# Patient Record
Sex: FEMALE | Race: WHITE
[De-identification: names, ages, dates, MRNs, and addresses within clinical notes are randomized per-mention and may not be internally consistent; named-entity substitution may affect disease eponyms.]

---

## 2018-09-11 ENCOUNTER — HOSPITAL ENCOUNTER (EMERGENCY)
Dept: HOSPITAL 97 - ER | Age: 43
LOS: 1 days | Discharge: HOME | End: 2018-09-12
Payer: SELF-PAY

## 2018-09-11 DIAGNOSIS — Z88.0: ICD-10-CM

## 2018-09-11 DIAGNOSIS — N39.0: Primary | ICD-10-CM

## 2018-09-11 DIAGNOSIS — R55: ICD-10-CM

## 2018-09-11 DIAGNOSIS — K80.20: ICD-10-CM

## 2018-09-11 DIAGNOSIS — Z88.5: ICD-10-CM

## 2018-09-11 LAB
HCT VFR BLD CALC: 37.2 % (ref 36–45)
INR BLD: 0.95
LYMPHOCYTES # SPEC AUTO: 1.7 K/UL (ref 0.7–4.9)
MCH RBC QN AUTO: 32.3 PG (ref 27–35)
MCV RBC: 92.6 FL (ref 80–100)
PMV BLD: 9.5 FL (ref 7.6–11.3)
RBC # BLD: 4.02 M/UL (ref 3.86–4.86)

## 2018-09-11 PROCEDURE — 74177 CT ABD & PELVIS W/CONTRAST: CPT

## 2018-09-11 PROCEDURE — 99285 EMERGENCY DEPT VISIT HI MDM: CPT

## 2018-09-11 PROCEDURE — 85730 THROMBOPLASTIN TIME PARTIAL: CPT

## 2018-09-11 PROCEDURE — 83690 ASSAY OF LIPASE: CPT

## 2018-09-11 PROCEDURE — 87086 URINE CULTURE/COLONY COUNT: CPT

## 2018-09-11 PROCEDURE — 83880 ASSAY OF NATRIURETIC PEPTIDE: CPT

## 2018-09-11 PROCEDURE — 85025 COMPLETE CBC W/AUTO DIFF WBC: CPT

## 2018-09-11 PROCEDURE — 93005 ELECTROCARDIOGRAM TRACING: CPT

## 2018-09-11 PROCEDURE — 96374 THER/PROPH/DIAG INJ IV PUSH: CPT

## 2018-09-11 PROCEDURE — 80076 HEPATIC FUNCTION PANEL: CPT

## 2018-09-11 PROCEDURE — 81015 MICROSCOPIC EXAM OF URINE: CPT

## 2018-09-11 PROCEDURE — 36415 COLL VENOUS BLD VENIPUNCTURE: CPT

## 2018-09-11 PROCEDURE — 83735 ASSAY OF MAGNESIUM: CPT

## 2018-09-11 PROCEDURE — 70450 CT HEAD/BRAIN W/O DYE: CPT

## 2018-09-11 PROCEDURE — 84484 ASSAY OF TROPONIN QUANT: CPT

## 2018-09-11 PROCEDURE — 85610 PROTHROMBIN TIME: CPT

## 2018-09-11 PROCEDURE — 96375 TX/PRO/DX INJ NEW DRUG ADDON: CPT

## 2018-09-11 PROCEDURE — 82553 CREATINE MB FRACTION: CPT

## 2018-09-11 PROCEDURE — 82550 ASSAY OF CK (CPK): CPT

## 2018-09-11 PROCEDURE — 82150 ASSAY OF AMYLASE: CPT

## 2018-09-11 PROCEDURE — 80048 BASIC METABOLIC PNL TOTAL CA: CPT

## 2018-09-11 PROCEDURE — 87088 URINE BACTERIA CULTURE: CPT

## 2018-09-11 PROCEDURE — 96361 HYDRATE IV INFUSION ADD-ON: CPT

## 2018-09-11 PROCEDURE — 71045 X-RAY EXAM CHEST 1 VIEW: CPT

## 2018-09-12 VITALS — TEMPERATURE: 98.3 F

## 2018-09-12 VITALS — OXYGEN SATURATION: 98 %

## 2018-09-12 VITALS — SYSTOLIC BLOOD PRESSURE: 103 MMHG | DIASTOLIC BLOOD PRESSURE: 71 MMHG

## 2018-09-12 LAB
ALBUMIN SERPL BCP-MCNC: 3.4 G/DL (ref 3.4–5)
ALP SERPL-CCNC: 85 U/L (ref 45–117)
ALT SERPL W P-5'-P-CCNC: 16 U/L (ref 12–78)
AMYLASE SERPL-CCNC: 34 U/L (ref 25–115)
AST SERPL W P-5'-P-CCNC: 12 U/L (ref 15–37)
BUN BLD-MCNC: 7 MG/DL (ref 7–18)
CKMB CREATINE KINASE MB: < 1 NG/ML (ref 0.3–3.6)
GLUCOSE SERPLBLD-MCNC: 98 MG/DL (ref 74–106)
LIPASE SERPL-CCNC: 102 U/L (ref 73–393)
MAGNESIUM SERPL-MCNC: 2.1 MG/DL (ref 1.8–2.4)
NT-PROBNP SERPL-MCNC: 251 PG/ML (ref ?–125)
POTASSIUM SERPL-SCNC: 3.5 MMOL/L (ref 3.5–5.1)
TROPONIN (EMERG DEPT USE ONLY): < 0.02 NG/ML (ref 0–0.04)
UA COMPLETE W REFLEX CULTURE PNL UR: (no result)

## 2018-09-12 NOTE — RAD REPORT
EXAM DESCRIPTION:  RAD - Chest Single View - 9/12/2018 12:04 am

 

CLINICAL HISTORY:  syncope;Chest pain

Chest pain.

 

COMPARISON:  Chest Single View dated 11/1/2017

 

FINDINGS:  Portable technique limits examination quality.

 

The lungs are grossly clear. The heart is normal in size. No displaced fractures.

 

IMPRESSION:  No acute intrathoracic process suspected.

## 2018-09-12 NOTE — EKG
Test Date:    2018-09-12               Test Time:    00:05:29

Technician:   [facility]                                    

                                                     

MEASUREMENT RESULTS:                                       

Intervals:                                           

Rate:         57                                     

GA:           142                                    

QRSD:         80                                     

QT:           432                                    

QTc:          420                                    

Axis:                                                

P:            74                                     

GA:           142                                    

QRS:          32                                     

T:            37                                     

                                                     

INTERPRETIVE STATEMENTS:                                       

                                                     

Sinus bradycardia

Otherwise normal ECG

No previous ECG available for comparison



Electronically Signed On 09-12-18 12:08:39 CDT by Ricky Marie

## 2018-09-12 NOTE — EDPHYS
Physician Documentation                                                                           

 [facility]                                                                

Name: Ashley Ayala                                                                             

Age: 43 yrs                                                                                       

Sex: Female                                                                                       

: 1975                                                                                   

MRN: [id number]                                                                                   

Arrival Date: 2018                                                                          

Time: 22:33                                                                                       

Account#: [id number]                                                                            

Bed 27                                                                                            

Private MD:                                                                                       

ED Physician Christopher Hou                                                                             

HPI:                                                                                              

                                                                                             

23:20 This 43 yrs old  Female presents to ER via EMS with complaints of Syncope.     pkl 

23:20 The patient presents with abdominal pain right lower quadrant. Onset: The               pkl 

      symptoms/episode began/occurred 3 week(s) ago, and became worse just prior to arrival.      

      Associated signs and symptoms: Pertinent positives: syncope.                                

                                                                                                  

OB/GYN:                                                                                           

22:56 LMP 2018                                                                            tl3 

                                                                                                  

Historical:                                                                                       

- Allergies:                                                                                      

22:53 Codeine;                                                                                tl3 

22:53 Hydrocodone-Acetaminophen;                                                              tl3 

22:53 PENICILLINS;                                                                            tl3 

- Home Meds:                                                                                      

22:53 None [Active];                                                                          tl3 

- PSHx:                                                                                           

22:53 None;                                                                                   tl3 

                                                                                                  

- Immunization history:: Adult Immunizations up to date.                                          

- Social history:: Smoking status: unknown.                                                       

- Ebola Screening: : No symptoms or risks identified at this time.                                

                                                                                                  

                                                                                                  

ROS:                                                                                              

23:20 Eyes: Negative for injury, pain, redness, and discharge, ENT: Negative for injury,      pkl 

      pain, and discharge, Neck: Negative for injury, pain, and swelling, Cardiovascular:         

      Negative for chest pain, palpitations, and edema, Respiratory: Negative for shortness       

      of breath, cough, wheezing, and pleuritic chest pain.                                       

23:20 Abdomen/GI: Positive for abdominal pain, of the right lower quadrant.                       

23:20 Back: Negative for acute changes.                                                           

23:20 : Negative for urinary symptoms.                                                          

23:20 MS/extremity: Negative for acute changes.                                                   

23:20 Skin: Negative for rash.                                                                    

23:20 Neuro: Negative for altered mental status.                                                  

                                                                                                  

Exam:                                                                                             

23:20 Head/Face:  Normocephalic, atraumatic. Eyes:  Pupils equal round and reactive to light, pkl 

      extra-ocular motions intact.  Lids and lashes normal.  Conjunctiva and sclera are           

      non-icteric and not injected.  Cornea within normal limits.  Periorbital areas with no      

      swelling, redness, or edema. ENT:  Nares patent. No nasal discharge, no septal              

      abnormalities noted.  Tympanic membranes are normal and external auditory canals are        

      clear.  Oropharynx with no redness, swelling, or masses, exudates, or evidence of           

      obstruction, uvula midline.  Mucous membranes moist. Neck:  Trachea midline, no             

      thyromegaly or masses palpated, and no cervical lymphadenopathy.  Supple, full range of     

      motion without nuchal rigidity, or vertebral point tenderness.  No Meningismus.             

      Chest/axilla:  Normal chest wall appearance and motion.  Nontender with no deformity.       

      No lesions are appreciated. Cardiovascular:  Regular rate and rhythm with a normal S1       

      and S2.  No gallops, murmurs, or rubs.  Normal PMI, no JVD.  No pulse deficits.             

      Respiratory:  Lungs have equal breath sounds bilaterally, clear to auscultation and         

      percussion.  No rales, rhonchi or wheezes noted.  No increased work of breathing, no        

      retractions or nasal flaring.                                                               

23:20 Abdomen/GI: Bowel sounds: normal, Palpation: soft, mild abdominal tenderness, in the        

      right lower quadrant.                                                                       

23:20 Back: Exam negative for acute changes.                                                      

23:20 : Exam negative for acute changes.                                                        

23:20 Musculoskeletal/extremity: Exam is negative for acute changes.                              

23:20 Skin: Exam negative for rash.                                                               

23:20 Neuro: Orientation: is normal, Mentation: is normal, Cranial nerves: grossly normal,        

      Motor: is normal.                                                                           

                                                                                                  

Vital Signs:                                                                                      

22:56  / 62; Pulse 61; Resp 18; Temp 98.3; Pulse Ox 100% on R/A;                        tl3 

                                                                                             

00:34  / 67; Pulse 52; Resp 18; Pulse Ox 98% on R/A;                                    tl3 

01:03  / 71; Pulse 71; Resp 16; Pulse Ox 98% on R/A;                                    tl3 

                                                                                                  

MDM:                                                                                              

                                                                                             

23:12 Patient medically screened.                                                             pkl 

                                                                                             

02:34 Data reviewed: vital signs, nurses notes, lab test result(s), radiologic studies, CT    pkl 

      scan.                                                                                       

                                                                                                  

                                                                                             

23:18 Order name: Basic Metabolic Panel; Complete Time: 02:32                                 pkl 

                                                                                             

23:18 Order name: CBC with Diff; Complete Time: 02:32                                         pkl 

                                                                                             

23:18 Order name: Ckmb; Complete Time: 02:32                                                  pkl 

                                                                                             

23:18 Order name: CPK; Complete Time: 02:32                                                   pkl 

                                                                                             

23:18 Order name: LFT's; Complete Time: 02:32                                                 pkl 

                                                                                             

23:18 Order name: Magnesium; Complete Time: 02:32                                             pkl 

                                                                                             

23:18 Order name: NT PRO-BNP; Complete Time: 02:32                                            pkl 

                                                                                             

23:18 Order name: PT-INR; Complete Time: 02:32                                                pkl 

                                                                                             

23:18 Order name: Ptt, Activated; Complete Time: 02:32                                        pkl 

                                                                                             

23:18 Order name: Troponin (emerg Dept Use Only); Complete Time: 02:32                        pkl 

                                                                                             

23:18 Order name: Amylase, Serum; Complete Time: 02:32                                        pkl 

                                                                                             

23:18 Order name: Lipase; Complete Time: 02:32                                                pkl 

                                                                                             

23:29 Order name: UA MICROSCOPIC; Complete Time: 02:32                                        tl3 

                                                                                             

00:18 Order name: Urine Culture                                                               [facility]

                                                                                             

23:18 Order name: XRAY Chest (1 view)                                                         pkl 

                                                                                             

23:18 Order name: EKG; Complete Time: 23:19                                                   pkl 

                                                                                             

23:18 Order name: Cardiac monitoring; Complete Time: 01:39                                    pkl 

                                                                                             

23:18 Order name: EKG - Nurse/Tech; Complete Time: 00:09                                      pkl 

                                                                                             

23:18 Order name: IV Saline Lock; Complete Time: 01:39                                        pkl 

                                                                                             

23:18 Order name: Labs collected and sent; Complete Time: 01:39                               pkl 

                                                                                             

23:18 Order name: O2 Per Protocol; Complete Time: 01:39                                       pkl 

                                                                                             

23:18 Order name: O2 Sat Monitoring; Complete Time: 01:39                                     pkl 

                                                                                             

23:18 Order name: CT Abd/Pelvis - W/Contrast                                                  pkl 

                                                                                             

23:18 Order name: CT Head Brain wo Cont                                                       pkl 

                                                                                             

23:18 Order name: Urine Dipstick-Ancillary (obtain specimen); Complete Time: 01:39            pkl 

                                                                                                  

Administered Medications:                                                                         

                                                                                             

23:46 Drug: Zofran 4 mg Route: IVP; Infused Over: 2 mins; Site: right antecubital;            tl3 

                                                                                             

00:38 Follow up: Response: No adverse reaction                                                tl3 

00:37 Drug: morphine 2 mg Route: IVP; Infused Over: 2 mins; Site: right antecubital;          tl3 

02:38 Follow up: Response: No adverse reaction; Marked relief of symptoms                     mg2 

00:38 Drug: NS 0.9% 1000 ml Route: IV; Rate: 1000 ml; Site: right antecubital; Delivery:      tl3 

      Primary tubing;                                                                             

00:38 Drug: NS 0.9% 1000 ml Route: IV; Rate: 125 ml/hr; Site: right antecubital;              tl3 

02:57 Follow up: IV Status: Order to discontinue infusion                                     mg2 

02:51 Drug: Cipro 500 mg Route: PO;                                                           mg2 

02:57 Follow up: Response: No adverse reaction; Medication administered at discharge.         mg2 

02:51 Drug: UltRAM 50 mg Route: PO;                                                           mg2 

02:56 Follow up: Response: No adverse reaction; Medication administered at discharge.         mg2 

                                                                                                  

                                                                                                  

Disposition:                                                                                      

18 02:36 Discharged to Home. Impression: Abdominal pain. Urinary tract infection.           

  Cholelitiasis. Sycopal episode.                                                                 

- Condition is Stable.                                                                            

                                                                                                  

- Prescriptions for Ultram 50 mg Oral Tablet - take 1 tablet by ORAL route every 8                

  hours As needed; 20 tablet. Cipro 500 mg Oral Tablet - take 1 tablet by ORAL route              

  every 12 hours for 7 days; 14 tablet.                                                           

- Medication Reconciliation Form, Thank You Letter, Antibiotic Education, Prescription            

  Opioid Use form.                                                                                

- Follow up: Private Physician; When: 2 - 3 days; Reason: Re-evaluation by your                   

  physician.                                                                                      

- Problem is new.                                                                                 

- Symptoms have improved.                                                                         

                                                                                                  

                                                                                                  

                                                                                                  

Signatures:                                                                                       

Dispatcher [employer]                           EDMS                                                 

Christopher Hou MD MD pkl Lowrey, Tammy, RN                       RN   tl3                                                  

Hesham Garrido RN                    RN   mg2                                                  

                                                                                                  

Corrections: (The following items were deleted from the chart)                                    

02:40 02:36 2018 02:36 Discharged to Home. Impression: Abdominal pain. Urinary tract    pkl 

      infection. Cholelitiasis. Condition is Stable. Forms are Medication Reconciliation          

      Form, Thank You Letter, Antibiotic Education, Prescription Opioid Use. Follow up:           

      Private Physician; When: 2 - 3 days; Reason: Re-evaluation by your physician. Problem       

      is new. Symptoms have improved. pkl                                                         

02:58 02:40 2018 02:36 Discharged to Home. Impression: Abdominal pain. Urinary tract    mg2 

      infection. Cholelitiasis. Sycopal episode. Condition is Stable. Prescriptions for           

      Ultram 50 mg Oral Tablet - take 1 tablet by ORAL route every 8 hours As needed; 20          

      tablet, Cipro 500 mg Oral Tablet - take 1 tablet by ORAL route every 12 hours for 7         

      days; 14 tablet. and Forms are Medication Reconciliation Form, Thank You Letter,            

      Antibiotic Education, Prescription Opioid Use. Follow up: Private Physician; When: 2 -      

      3 days; Reason: Re-evaluation by your physician. Problem is new. Symptoms have              

      improved. pkl                                                                               

                                                                                                  

**************************************************************************************************

## 2018-09-12 NOTE — RAD REPORT
EXAM DESCRIPTION:  CTAbdomen   Pelvis W Contrast - 9/12/2018 4:36 am

 

CLINICAL HISTORY:  Abdominal pain.

ABD PAIN

 

COMPARISON:  CT ABD PELVIS W CONTRAST dated 6/6/2015

 

TECHNIQUE:  Biphasic CT imaging of the abdomen and pelvis was performed with 100 ml non-ionic IV cont
rast.

 

All CT scans are performed using dose optimization technique as appropriate and may include automated
 exposure control or mA/KV adjustment according to patient size.

 

FINDINGS:  The lung bases are clear.

 

The liver, spleen, pancreas, adrenal glands and kidneys are within normal limits. Cholelithiasis.

 

No bowel obstruction, free air, free fluid or abscess. Moderate stool is present throughout the colon
. The appendix is normal.  No evidence of significant lymphadenopathy.

 

No suspicious bony findings.

 

IMPRESSION:  Cholelithiasis.

 

Moderate fecal retention throughout the colon.

## 2018-09-12 NOTE — RAD REPORT
EXAM DESCRIPTION:  CT - Head Brain Wo Cont - 9/12/2018 4:34 am

 

CLINICAL HISTORY:  SYNCOPE

 

COMPARISON:  CT FACIAL BONES W CON   MPR dated 3/10/2011

 

TECHNIQUE:  All CT scans are performed using dose optimization technique as appropriate and may inclu
de automated exposure control or mA/KV adjustment according to patient size.

 

FINDINGS:  No intracranial hemorrhage, hydrocephalus or extra-axial fluid collection.No areas of brai
n edema or evidence of midline shift.

 

The paranasal sinuses and mastoids are clear. The calvarium is intact.

 

IMPRESSION:  No acute intracranial abnormality.

## 2018-09-12 NOTE — ER
Nurse's Notes                                                                                     

 [facility]                                                                

Name: Ashley Ayala                                                                             

Age: 43 yrs                                                                                       

Sex: Female                                                                                       

: 1975                                                                                   

MRN: [id number]                                                                                   

Arrival Date: 2018                                                                          

Time: 22:33                                                                                       

Account#: [id number]                                                                            

Bed 27                                                                                            

Private MD:                                                                                       

Diagnosis: Abdominal pain. Urinary tract infection. Cholelitiasis. Sycopal episode                

                                                                                                  

Presentation:                                                                                     

                                                                                             

22:51 Presenting complaint: EMS states: abdominal pain, back pain started tonight, became     tl3 

      diaphoretic with possible syncope. Transition of care: patient was not received from        

      another setting of care. Onset of symptoms was 2018. Risk Assessment: Do      

      you want to hurt yourself or someone else? Patient reports no desire to harm self or        

      others. Initial Sepsis Screen: Does the patient meet any 2 criteria? No. Patient's          

      initial sepsis screen is negative. Does the patient have a suspected source of              

      infection? No. Patient's initial sepsis screen is negative. Care prior to arrival: None.    

22:51 Method Of Arrival: EMS: [facility]                                                    tl3 

22:51 Acuity: GURVINDER 3                                                                           tl3 

                                                                                                  

Triage Assessment:                                                                                

22:53 General: Appears uncomfortable, obese, well groomed, well developed, well nourished,    tl3 

      Behavior is cooperative, appropriate for age. Pain: Complains of pain in abdomen Pain       

      currently is 10 out of 10 on a pain scale. EENT: No signs and/or symptoms were reported     

      regarding the EENT system. Neuro: Level of Consciousness is awake, alert, obeys             

      commands, Oriented to person, place, time, situation, Appropriate for age Reports           

      numbness in left arm. Cardiovascular: Heart tones S1 S2 present Patient's skin is warm      

      and dry. Respiratory: Airway is patent Respiratory effort is even, unlabored,               

      Respiratory pattern is regular, symmetrical, Breath sounds are clear bilaterally. GI:       

      Reports last BM two days ago, not on a regular schedule. GI: Abdomen is round Last BM       

      was 2018. Reports lower abdominal pain. : No signs and/or symptoms were     

      reported regarding the genitourinary system. Derm: No signs and/or symptoms reported        

      regarding the dermatologic system. Musculoskeletal: No signs and/or symptoms reported       

      regarding the musculoskeletal system.                                                       

                                                                                                  

OB/GYN:                                                                                           

22:56 LMP 2018                                                                            tl3 

                                                                                                  

Historical:                                                                                       

- Allergies:                                                                                      

22:53 Codeine;                                                                                tl3 

22:53 Hydrocodone-Acetaminophen;                                                              tl3 

22:53 PENICILLINS;                                                                            tl3 

- Home Meds:                                                                                      

22:53 None [Active];                                                                          tl3 

- PSHx:                                                                                           

22:53 None;                                                                                   tl3 

                                                                                                  

- Immunization history:: Adult Immunizations up to date.                                          

- Social history:: Smoking status: unknown.                                                       

- Ebola Screening: : No symptoms or risks identified at this time.                                

                                                                                                  

                                                                                                  

Screenin:58 Abuse screen: Denies threats or abuse. Nutritional screening: No deficits noted.        tl3 

      Tuberculosis screening: No symptoms or risk factors identified. Fall Risk None              

      identified.                                                                                 

                                                                                                  

Assessment:                                                                                       

22:58 Reassessment: No changes from previously documented assessment. Neuro: No deficits      tl3 

      noted. Level of Consciousness is awake, alert, obeys commands, Oriented to person,          

      place, time, situation, Appropriate for age. Cardiovascular: Patient's skin is warm and     

      dry. Rhythm is regular.                                                                     

09                                                                                             

00:34 Reassessment: No changes from previously documented assessment. Patient and/or family   tl3 

      updated on plan of care and expected duration. Pain level reassessed. Patient is alert,     

      oriented x 3, equal unlabored respirations, skin warm/dry/pink.                             

01:03 Reassessment: No changes from previously documented assessment. Patient and/or family   tl3 

      updated on plan of care and expected duration. Pain level reassessed. Patient is alert,     

      oriented x 3, equal unlabored respirations, skin warm/dry/pink.                             

01:04 Reassessment: report given to NERI Vincent.                                               tl3 

                                                                                                  

Vital Signs:                                                                                      

                                                                                             

22:56  / 62; Pulse 61; Resp 18; Temp 98.3; Pulse Ox 100% on R/A;                        tl3 

12                                                                                             

00:34  / 67; Pulse 52; Resp 18; Pulse Ox 98% on R/A;                                    tl3 

01:03  / 71; Pulse 71; Resp 16; Pulse Ox 98% on R/A;                                    tl3 

                                                                                                  

ED Course:                                                                                        

                                                                                             

22:33 Patient arrived in ED.                                                                  ds1 

22:36 Ruth Cantor, RN is Primary Nurse.                                                     tl3 

22:52 Triage completed.                                                                       tl3 

22:56 Arm band placed on right wrist.                                                         tl3 

22:58 Patient has correct armband on for positive identification. Bed in low position. Call   tl3 

      light in reach. Side rails up X 1. Adult w/ patient. Pulse ox on. NIBP on.                  

22:58 No provider procedures requiring assistance completed. Inserted saline lock: 20 gauge   tl3 

      in right antecubital area, using aseptic technique. Blood collected.                        

23:12 Christopher Hou MD is Attending Physician.                                                    pkl 

23:13 ED physician to see patient.                                                            tl3 

23:46 Patient moved to CT via wheelchair.                                                     kw1 

23:52 XRAY Chest (1 view) In Process Unspecified.                                             EDMS

23:54 CT Head Brain wo Cont In Process Unspecified.                                           EDMS

23:57 CT completed. Patient tolerated procedure well. Patient moved back from CT.             kw1 

09/12                                                                                             

00:06 EKG done, by ED staff, reviewed by Christopher Hou MD.                                          cc1 

00:19 X-ray completed. Portable x-ray completed in exam room. Patient tolerated procedure     kw  

      well.                                                                                       

02:03 Patient moved to CT via wheelchair.                                                     kw1 

02:12 CT Abd/Pelvis - W/Contrast In Process Unspecified.                                      EDMS

02:13 CT completed. Patient tolerated procedure well. Patient moved back from CT.             kw1 

02:57 IV discontinued, intact, bleeding controlled, No redness/swelling at site. Pressure     mg2 

      dressing applied.                                                                           

                                                                                                  

Administered Medications:                                                                         

                                                                                             

23:46 Drug: Zofran 4 mg Route: IVP; Infused Over: 2 mins; Site: right antecubital;            tl3 

09/                                                                                             

00:38 Follow up: Response: No adverse reaction                                                tl3 

00:37 Drug: morphine 2 mg Route: IVP; Infused Over: 2 mins; Site: right antecubital;          tl3 

02:38 Follow up: Response: No adverse reaction; Marked relief of symptoms                     mg2 

00:38 Drug: NS 0.9% 1000 ml Route: IV; Rate: 1000 ml; Site: right antecubital; Delivery:      tl3 

      Primary tubing;                                                                             

00:38 Drug: NS 0.9% 1000 ml Route: IV; Rate: 125 ml/hr; Site: right antecubital;              tl3 

02:57 Follow up: IV Status: Order to discontinue infusion                                     mg2 

02:51 Drug: Cipro 500 mg Route: PO;                                                           mg2 

02:57 Follow up: Response: No adverse reaction; Medication administered at discharge.         mg2 

02:51 Drug: UltRAM 50 mg Route: PO;                                                           mg2 

02:56 Follow up: Response: No adverse reaction; Medication administered at discharge.         mg2 

                                                                                                  

                                                                                                  

Outcome:                                                                                          

02:36 Discharge ordered by MD.                                                                pkl 

02:58 Discharged to home via wheelchair.                                                      mg2 

02:58 Condition: stable                                                                           

02:58 Discharge instructions given to patient, Instructed on discharge instructions, follow       

      up and referral plans. medication usage, Demonstrated understanding of instructions,        

      follow-up care, medications, Prescriptions given X 2.                                       

02:58 Patient left the ED.                                                                    mg2 

                                                                                                  

Signatures:                                                                                       

Dispatcher [employer]                           EDMS                                                 

Christopher Hou MD MD   pkl                                                  

Chel Mon                                ds1                                                  

Chikis Liu Charlie cc1                                                  

Aminata Burroughs1                                                  

Ruth Cantor, RN                       RN   tl3                                                  

Hesham Garrido RN                    RN   mg2                                                  

                                                                                                  

**************************************************************************************************

## 2018-10-30 LAB
ALBUMIN SERPL BCP-MCNC: 3.7 G/DL (ref 3.4–5)
ALP SERPL-CCNC: 83 U/L (ref 45–117)
ALT SERPL W P-5'-P-CCNC: 16 U/L (ref 12–78)
AMYLASE SERPL-CCNC: 52 U/L (ref 25–115)
AST SERPL W P-5'-P-CCNC: 13 U/L (ref 15–37)
BUN BLD-MCNC: 6 MG/DL (ref 7–18)
GLUCOSE SERPLBLD-MCNC: 84 MG/DL (ref 74–106)
HCT VFR BLD CALC: 38.9 % (ref 36–45)
LYMPHOCYTES # SPEC AUTO: 1.6 K/UL (ref 0.7–4.9)
MCH RBC QN AUTO: 31.5 PG (ref 27–35)
MCV RBC: 93.2 FL (ref 80–100)
PMV BLD: 9.7 FL (ref 7.6–11.3)
POTASSIUM SERPL-SCNC: 3.8 MMOL/L (ref 3.5–5.1)
RBC # BLD: 4.17 M/UL (ref 3.86–4.86)

## 2018-10-31 ENCOUNTER — HOSPITAL ENCOUNTER (OUTPATIENT)
Dept: HOSPITAL 97 - OR | Age: 43
Discharge: HOME | End: 2018-10-31
Attending: SURGERY
Payer: SELF-PAY

## 2018-10-31 VITALS — TEMPERATURE: 97.4 F | OXYGEN SATURATION: 97 % | DIASTOLIC BLOOD PRESSURE: 70 MMHG | SYSTOLIC BLOOD PRESSURE: 103 MMHG

## 2018-10-31 DIAGNOSIS — Z72.0: ICD-10-CM

## 2018-10-31 DIAGNOSIS — K66.0: ICD-10-CM

## 2018-10-31 DIAGNOSIS — Z88.6: ICD-10-CM

## 2018-10-31 DIAGNOSIS — K80.10: Primary | ICD-10-CM

## 2018-10-31 DIAGNOSIS — Z88.0: ICD-10-CM

## 2018-10-31 PROCEDURE — 85025 COMPLETE CBC W/AUTO DIFF WBC: CPT

## 2018-10-31 PROCEDURE — 88304 TISSUE EXAM BY PATHOLOGIST: CPT

## 2018-10-31 PROCEDURE — 0FT44ZZ RESECTION OF GALLBLADDER, PERCUTANEOUS ENDOSCOPIC APPROACH: ICD-10-PCS

## 2018-10-31 PROCEDURE — 80076 HEPATIC FUNCTION PANEL: CPT

## 2018-10-31 PROCEDURE — 36415 COLL VENOUS BLD VENIPUNCTURE: CPT

## 2018-10-31 PROCEDURE — 80048 BASIC METABOLIC PNL TOTAL CA: CPT

## 2018-10-31 PROCEDURE — 82150 ASSAY OF AMYLASE: CPT

## 2018-10-31 PROCEDURE — 0DNU4ZZ RELEASE OMENTUM, PERCUTANEOUS ENDOSCOPIC APPROACH: ICD-10-PCS

## 2018-10-31 PROCEDURE — 84703 CHORIONIC GONADOTROPIN ASSAY: CPT

## 2018-10-31 RX ADMIN — HYDROMORPHONE HYDROCHLORIDE ONE MG: 1 INJECTION, SOLUTION INTRAMUSCULAR; INTRAVENOUS; SUBCUTANEOUS at 12:14

## 2018-10-31 RX ADMIN — MEPERIDINE HYDROCHLORIDE ONE MG: 50 INJECTION, SOLUTION INTRAMUSCULAR; INTRAVENOUS; SUBCUTANEOUS at 11:37

## 2018-10-31 RX ADMIN — MEPERIDINE HYDROCHLORIDE ONE MG: 50 INJECTION, SOLUTION INTRAMUSCULAR; INTRAVENOUS; SUBCUTANEOUS at 11:42

## 2018-10-31 RX ADMIN — MEPERIDINE HYDROCHLORIDE ONE MG: 50 INJECTION, SOLUTION INTRAMUSCULAR; INTRAVENOUS; SUBCUTANEOUS at 11:47

## 2018-10-31 RX ADMIN — HYDROMORPHONE HYDROCHLORIDE ONE MG: 1 INJECTION, SOLUTION INTRAMUSCULAR; INTRAVENOUS; SUBCUTANEOUS at 12:20

## 2018-10-31 RX ADMIN — MEPERIDINE HYDROCHLORIDE ONE MG: 50 INJECTION, SOLUTION INTRAMUSCULAR; INTRAVENOUS; SUBCUTANEOUS at 11:52

## 2018-10-31 NOTE — OP
Date of Procedure:  10/31/2018



Surgeon:  Byron Mcghee MD



Assistant:  AVANI Augustine.



Preoperative Diagnosis:  Symptomatic cholelithiasis.



Postoperative Diagnoses:  Symptomatic cholelithiasis with extensive adhesions.



Procedures Performed:  Laparoscopic cholecystectomy and lysis of adhesions.



Estimated Blood Loss:  Minimal.



Specimen:  Gallbladder.



Finding:  As above.



Anesthesia:  General.



Complications:  None.



Disposition:  The patient tolerated the procedure in stable condition and taken to Recovery in good g
eneral condition.



Procedure In Detail:  The patient was brought to the OR and placed in supine position.  General anest
hesia was begun.  The patient was prepped and draped in usual sterile fashion.  Marcaine 0.5% was inf
iltrated locally.  A 15-blade was used to make a 1 cm supraumbilical midline incision.  Subcutaneous 
tissue divided.  The fascia was identified and divided.  #1 Vicryl stay suture was placed.  Peritonea
l cavity was entered with sharp and blunt dissection 12 mm trocar was placed into the peritoneal cavi
ty under direct vision.  Pneumoperitoneum was established.  Then, three 5 mm trocars were placed 1 in
 the epigastrium just to the right of midline and 2 in the right subcostal region.  Laparoscopy revea
led extensive adhesions to the gallbladder, they were omental in nature.  A cautery utilized to relea
se the adhesions from the gallbladder and the liver and then the fundus retracted superiorly.  Infund
ibulum was identified and retracted inferolaterally.  Cystic duct and cystic artery were clearly iden
tified with blunt dissection.  Clips placed.  Both structures were divided.  Cautery was used to cindy
ve the gallbladder from the liver bed.  Bleeding on the liver bed was controlled with cautery.  The g
allbladder was retrieved through the umbilicus via an EndoCatch bag.  Right upper quadrant examined. 
 No evidence of bleeding or bile leakage appreciated.  Subsequently, all trocars were removed under d
irect vision.  Stay sutures were tied to each other to approximate the fascial defect.  Subcu wounds 
were irrigated.  Bleeding controlled with cautery.  A 3-0 chromic used to approximate the subcutaneou
s tissue and close the skin.  Sterile dressing was applied.  The patient was awakened and taken to Re
covery in good general condition.





AS/MODL

DD:  10/31/2018 11:22:35Voice ID:  [id number]

DT:  10/31/2018 12:42:07Report ID:  [id number]

## 2018-10-31 NOTE — DS
Discharge Note:  The patient will go to Day Surgery and home when stable.



Disposition:  Home.



Condition:  Stable.



Discharge Instructions:  Resume home medications and diet.  Activity as tolerated.  No heavy lifting.
  Remove outer dressing in 2 days.  Shower.  Keep wound clean and dry.  Keep Steri-Strips on at all t
imes.  Incentive spirometry as ordered and Ultracet 1 tablet p.o. q.4 p.r.n. pain, and follow up in m
y office in 1 week.  Call for appointment.





AS/CONSTANCE

DD:  10/31/2018 11:22:35Voice ID:  [id number]

DT:  10/31/2018 12:43:52Report ID:  [id number]

## 2021-06-21 ENCOUNTER — HOSPITAL ENCOUNTER (EMERGENCY)
Dept: HOSPITAL 97 - ER | Age: 46
Discharge: HOME | End: 2021-06-21
Payer: SELF-PAY

## 2021-06-21 VITALS — SYSTOLIC BLOOD PRESSURE: 142 MMHG | DIASTOLIC BLOOD PRESSURE: 86 MMHG | OXYGEN SATURATION: 98 %

## 2021-06-21 VITALS — TEMPERATURE: 97.2 F

## 2021-06-21 DIAGNOSIS — S90.122A: Primary | ICD-10-CM
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
## 2021-06-21 DIAGNOSIS — F17.210: ICD-10-CM

## 2021-06-21 PROCEDURE — 99284 EMERGENCY DEPT VISIT MOD MDM: CPT

## 2021-06-21 PROCEDURE — 96372 THER/PROPH/DIAG INJ SC/IM: CPT

## 2021-06-21 NOTE — EDPHYS
Physician Documentation                                                                           

 [facility]                                                                 

Name: Ashley Ayala                                                                             

Age: 45 yrs                                                                                       

Sex: Female                                                                                       

: 1975                                                                                   

MRN: [id number]                                                                                   

Arrival Date: 2021                                                                          

Time: 02:14                                                                                       

Account#: [id number]                                                                            

Bed 7                                                                                             

Private MD:                                                                                       

ED Physician Parker Seaman                                                                      

HPI:                                                                                              

                                                                                             

03:42 This 45 yrs old  Female presents to ER via Wheelchair with complaints of Foot  mh7 

      Injury.                                                                                     

03:42 The patient presents with an injury. The complaints affect the left foot. Context: The  mh7 

      problem was sustained at home, resulted from stubbing toe on furniture. Mechanism of        

      Injury: direct blow the patient can fully bear weight, the patient is able to ambulate.     

      Onset: The symptoms/episode began/occurred yesterday. Modifying factors: The symptoms       

      are alleviated by nothing, the symptoms are aggravated by weight bearing. Associated        

      signs and symptoms: Pertinent negatives: calf tenderness, fever, nausea, numbness,          

      rash, swelling, tingling, vomiting, warmth, weakness. Severity of symptoms: At their        

      worst the symptoms were moderate, last night, in the emergency department the symptoms      

      are unchanged.                                                                              

                                                                                                  

OB/GYN:                                                                                           

02:57 LMP 2021                                                                           lp1 

                                                                                                  

Historical:                                                                                       

- Allergies:                                                                                      

02:28 Codeine;                                                                                em  

02:28 Hydrocodone-Acetaminophen;                                                              em  

02:28 PENICILLINS;                                                                            em  

- PMHx:                                                                                           

02:28 None;                                                                                   em  

- PSHx:                                                                                           

02:28 None;                                                                                   em  

                                                                                                  

- Immunization history:: Adult Immunizations up to date.                                          

- Social history:: Smoking status: Patient reports the use of cigarette tobacco                   

  products, smokes one-half pack cigarettes per day.                                              

                                                                                                  

                                                                                                  

ROS:                                                                                              

03:50 Constitutional: Negative for fever, chills, and weight loss, Eyes: Negative for injury, mh7 

      pain, redness, and discharge, ENT: Negative for injury, pain, and discharge, Neck:          

      Negative for injury, pain, and swelling, Cardiovascular: Negative for chest pain,           

      palpitations, and edema, Respiratory: Negative for shortness of breath, cough,              

      wheezing, and pleuritic chest pain, Abdomen/GI: Negative for abdominal pain, nausea,        

      vomiting, diarrhea, and constipation, Back: Negative for injury and pain, : Negative      

      for injury, bleeding, discharge, and swelling, Skin: Negative for injury, rash, and         

      discoloration, Neuro: Negative for headache, weakness, numbness, tingling, and seizure,     

      Psych: Negative for depression, anxiety, suicide ideation, homicidal ideation, and          

      hallucinations, Allergy/Immunology: Negative for hives, rash, and allergies, Endocrine:     

      Negative for neck swelling, polydipsia, polyuria, polyphagia, and marked weight             

      changes, Hematologic/Lymphatic: Negative for swollen nodes, abnormal bleeding, and          

      unusual bruising.                                                                           

                                                                                                  

Exam:                                                                                             

03:50 Constitutional:  This is a well developed, well nourished patient who is awake, alert,  mh7 

      and in no acute distress. Head/Face:  Normocephalic, atraumatic. Skin:  Warm, dry with      

      normal turgor.  Normal color with no rashes, no lesions, and no evidence of cellulitis.     

03:50 Neuro:  Awake and alert, GCS 15, oriented to person, place, time, and situation.            

      Cranial nerves II-XII grossly intact.  Motor strength 5/5 in all extremities.  Sensory      

      grossly intact.  Cerebellar exam normal.  Normal gait. Psych:  Awake, alert, with           

      orientation to person, place and time.  Behavior, mood, and affect are within normal        

      limits.                                                                                     

03:50 Musculoskeletal/extremity: Extremities: noted in the left fourth toe: contusion,            

      decreased ROM, ecchymosis, pain, tenderness, ROM: limited active range of motion due to     

      pain, in the left fourth toe, limited passive range of motion due to pain, in the left      

      fourth toe, Circulation is intact in all extremities. Sensation intact. Compartment         

      Syndrome exam of affected extremity: is normal. no numbness, no tingling, no sensation      

      deficit, no palor, no weak pulses, Joints: All joints appear normal with full range of      

      motion. Weight bearing: able to fully bear weight, Tendon exam: specific tendon testing     

      normal through active and passive range of motion                                           

                                                                                                  

Vital Signs:                                                                                      

02:26  / 86; Pulse 79; Resp 18; Pulse Ox 96% on R/A; Weight 92.53 kg; Height 5 ft. 5    em  

      in. (165.10 cm); Pain 10/10;                                                                

03:11  / 86; Pulse 52; Resp 16; Pulse Ox 98% on R/A;                                    lp1 

04:14 Temp 97.2(TE);                                                                          lp1 

02:26 Body Mass Index 33.95 (92.53 kg, 165.10 cm)                                             em  

                                                                                                  

MDM:                                                                                              

04:09 Differential diagnosis: fracture, sprain, arthritis, contusion. Data reviewed: vital    [facility] 

      signs, nurses notes, radiologic studies, plain films. Counseling: I had a detailed          

      discussion with the patient and/or guardian regarding: the historical points, exam          

      findings, and any diagnostic results supporting the discharge/admit diagnosis,              

      radiology results, the need for outpatient follow up, to return to the emergency            

      department if symptoms worsen or persist or if there are any questions or concerns that     

      arise at home. Response to treatment: the patient's symptoms have markedly improved         

      after treatment.                                                                            

04:11 Patient medically screened.                                                             [facility] 

                                                                                                  

                                                                                             

02:57 Order name: Foot Left 3 View XRAY                                                       [facility] 

                                                                                             

04:09 Order name: Orthopedic shoe; Complete Time: 04:23                                       [facility] 

                                                                                                  

Administered Medications:                                                                         

02:56 Drug: TORadol (ketorolac) 60 mg Route: IM; Site: right gluteus;                         lp1 

04:18 Follow up: Response: No adverse reaction                                                1 

                                                                                                  

                                                                                                  

Disposition:                                                                                      

21 04:11 Discharged to Home. Impression: Contusion, 4th Toe, Left Foot.                     

- Condition is Stable.                                                                            

- Discharge Instructions: Foot Contusion, Easy-to-Read.                                           

- Prescriptions for Ibuprofen 800 mg Oral Tablet - take 1 tablet by ORAL route every 8            

  hours As needed take with food; 15 tablet.                                                      

- Medication Reconciliation Form, Thank You Letter, Antibiotic Education, Prescription            

  Opioid Use form.                                                                                

- Follow up: Private Physician; When: 1 - 2 days; Reason: Worsening of condition,                 

  Recheck today's complaints, Continuance of care, Re-evaluation by your physician.               

  Follow up: Haris Bateman DPM; When: 2 - 3 days; Reason: Worsening of condition,                

  Recheck today's complaints.                                                                     

- Problem is new.                                                                                 

- Symptoms have improved.                                                                         

                                                                                                  

                                                                                                  

                                                                                                  

Signatures:                                                                                       

Dispatcher [employer]                           EDFarhat Petersen RN                        RN   em                                                   

Stacia Mercado RN                         RN   lp1                                                  

Parker Seaman MD MD   7                                                  

                                                                                                  

Corrections: (The following items were deleted from the chart)                                    

04:23 04:11 2021 04:11 Discharged to Home. Impression: Contusion, 4th Toe, Left Foot.   lp1 

      Condition is Stable. Forms are Medication Reconciliation Form, Thank You Letter,            

      Antibiotic Education, Prescription Opioid Use. Follow up: Private Physician; When: 1 -      

      2 days; Reason: Worsening of condition, Recheck today's complaints, Continuance of          

      care, Re-evaluation by your physician. Follow up: Haris Bateman; When: 2 - 3 days;           

      Reason: Worsening of condition, Recheck today's complaints. Problem is new. Symptoms        

      have improved. mh7                                                                          

                                                                                                  

**************************************************************************************************

## 2021-06-21 NOTE — RAD REPORT
EXAM DESCRIPTION:  RAD - Foot Left 3 View - 6/21/2021 3:36 am

 

CLINICAL HISTORY:  trauma, pain to fourth toe left foot

 

COMPARISON:  No comparisons

 

FINDINGS:  Punctate nondisplaced fractures present on the medial base fourth distal phalanx. Proximal
 and middle findings.

 

No other fracture or acute bone finding identified.

No air or foreign body in the soft tissues.

 

IMPRESSION:  Punctate fracture along the medial side base of the distal phalanx fourth toe. No displa
cement or angulation of this 1.5 millimeter sized fracture fragment.

## 2021-06-21 NOTE — ER
Nurse's Notes                                                                                     

 [facility]                                                                 

Name: Ashley Ayala                                                                             

Age: 45 yrs                                                                                       

Sex: Female                                                                                       

: 1975                                                                                   

MRN: [id number]                                                                                   

Arrival Date: 2021                                                                          

Time: 02:14                                                                                       

Account#: [id number]                                                                            

Bed 7                                                                                             

Private MD:                                                                                       

Diagnosis: Contusion, 4th Toe, Left Foot                                                          

                                                                                                  

Presentation:                                                                                     

                                                                                             

02:26 Chief complaint: Patient states: was playing with grandchild and kicked a chair on      em  

      accident at 8 PM, reports pain in the 4th toe on left foot, swelling/bruising noted to      

      toe. Coronavirus screen: Client denies travel out of the [place] in the last 14 days.          

      Ebola Screen: Patient negative for fever greater than or equal to 101.5 degrees             

      Fahrenheit, and additional compatible Ebola Virus Disease symptoms Patient denies           

      exposure to infectious person. Patient denies travel to an Ebola-affected area in the       

      21 days before illness onset. No symptoms or risks identified at this time. Initial         

      Sepsis Screen: Does the patient meet any 2 criteria? No. Patient's initial sepsis           

      screen is negative. Does the patient have a suspected source of infection? No.              

      Patient's initial sepsis screen is negative. Risk Assessment: Do you want to hurt           

      yourself or someone else? Patient reports no desire to harm self or others. Onset of        

      symptoms was 2021.                                                                 

02:26 Method Of Arrival: Wheelchair                                                           em  

02:26 Acuity: GURVINDER 4                                                                           em  

                                                                                                  

Triage Assessment:                                                                                

03:11 Injury Description: Bruise sustained to left fourth toe.                                lp1 

                                                                                                  

OB/GYN:                                                                                           

02:57 LMP 2021                                                                           lp1 

                                                                                                  

Historical:                                                                                       

- Allergies:                                                                                      

02:28 Codeine;                                                                                em  

02:28 Hydrocodone-Acetaminophen;                                                              em  

02:28 PENICILLINS;                                                                            em  

- PMHx:                                                                                           

02:28 None;                                                                                   em  

- PSHx:                                                                                           

02:28 None;                                                                                   em  

                                                                                                  

- Immunization history:: Adult Immunizations up to date.                                          

- Social history:: Smoking status: Patient reports the use of cigarette tobacco                   

  products, smokes one-half pack cigarettes per day.                                              

                                                                                                  

                                                                                                  

Screenin:57 Abuse screen: Denies threats or abuse. Denies injuries from another. Nutritional        lp1 

      screening: No deficits noted. Tuberculosis screening: No symptoms or risk factors           

      identified. Fall Risk None identified.                                                      

                                                                                                  

Assessment:                                                                                       

02:45 General: Appears in no apparent distress. Behavior is appropriate for age. Pain:        lp1 

      Complains of pain in dorsum of left foot, left first toe, left second toe, left third       

      toe, left fourth toe and left fifth toe Pain currently is 7 out of 10 on a pain scale.      

      Quality of pain is described as aching. Neuro: Level of Consciousness is awake, alert,      

      obeys commands. Cardiovascular: Patient's skin is warm and dry. Respiratory:                

      Respiratory effort is even, unlabored. GI: No signs and/or symptoms were reported           

      involving the gastrointestinal system. : No signs and/or symptoms were reported           

      regarding the genitourinary system. EENT: No signs and/or symptoms were reported            

      regarding the EENT system. Derm: Skin is pink, warm \T\ dry. Bruising that is dark          

      purple, on left fourth toe. Musculoskeletal: Circulation, motion, and sensation intact.     

02:56 Reassessment: Verbal order for Toradol 60mg IM now.                                     lp1 

                                                                                                  

Vital Signs:                                                                                      

02:26  / 86; Pulse 79; Resp 18; Pulse Ox 96% on R/A; Weight 92.53 kg; Height 5 ft. 5    em  

      in. (165.10 cm); Pain 10/10;                                                                

03:11  / 86; Pulse 52; Resp 16; Pulse Ox 98% on R/A;                                    lp1 

04:14 Temp 97.2(TE);                                                                          lp1 

02:26 Body Mass Index 33.95 (92.53 kg, 165.10 cm)                                             em  

                                                                                                  

ED Course:                                                                                        

02:14 Patient arrived in ED.                                                                  am4 

02:24 Parker Seaman MD is Attending Physician.                                             mh7 

02:27 Triage completed.                                                                       em  

02:28 Arm band placed on.                                                                     em  

02:56 Stacia Mercado, RN is Primary Nurse.                                                       lp1 

02:57 Patient has correct armband on for positive identification.                             lp1 

03:37 Foot Left 3 View XRAY In Process Unspecified.                                           EDMS

04:10 Haris Bateman DPM is Referral Physician.                                               7 

04:18 No provider procedures requiring assistance completed. Patient did not have IV access   lp1 

      during this emergency room visit.                                                           

04:22 Kiel tape left third toe and left fourth toe Ortho shoe applied to left foot.          lp1 

                                                                                                  

Administered Medications:                                                                         

02:56 Drug: TORadol (ketorolac) 60 mg Route: IM; Site: right gluteus;                         lp1 

04:18 Follow up: Response: No adverse reaction                                                lp1 

                                                                                                  

                                                                                                  

Outcome:                                                                                          

04:11 Discharge ordered by MD.                                                                johnny 

04:23 Discharged to home via wheelchair, with friend.                                         lp1 

04:23 Condition: good                                                                             

04:23 Discharge instructions given to patient, Instructed on discharge instructions, follow       

      up and referral plans. medication usage, Demonstrated understanding of instructions,        

      follow-up care, medications, Prescriptions given X 1.                                       

04:23 Patient left the ED.                                                                    lp1 

                                                                                                  

Signatures:                                                                                       

Dispatcher [employer]                           EDFarhat Petersen RN RN em Pena, Laura, RN RN   lp1                                                  

Parker Seaman MD MD mh7 Martinez, Ashley am4                                                  

                                                                                                  

**************************************************************************************************

## 2021-10-08 ENCOUNTER — HOSPITAL ENCOUNTER (EMERGENCY)
Dept: HOSPITAL 97 - ER | Age: 46
Discharge: HOME | End: 2021-10-08
Payer: SELF-PAY

## 2021-10-08 VITALS — SYSTOLIC BLOOD PRESSURE: 124 MMHG | DIASTOLIC BLOOD PRESSURE: 72 MMHG | OXYGEN SATURATION: 97 %

## 2021-10-08 VITALS — TEMPERATURE: 97.6 F

## 2021-10-08 DIAGNOSIS — Z88.6: ICD-10-CM

## 2021-10-08 DIAGNOSIS — F17.210: ICD-10-CM

## 2021-10-08 DIAGNOSIS — R19.7: ICD-10-CM

## 2021-10-08 DIAGNOSIS — Z20.822: ICD-10-CM

## 2021-10-08 DIAGNOSIS — R07.9: Primary | ICD-10-CM

## 2021-10-08 DIAGNOSIS — Z88.0: ICD-10-CM

## 2021-10-08 DIAGNOSIS — R11.10: ICD-10-CM

## 2021-10-08 LAB
ALBUMIN SERPL BCP-MCNC: 3.5 G/DL (ref 3.4–5)
ALP SERPL-CCNC: 88 U/L (ref 45–117)
ALT SERPL W P-5'-P-CCNC: 14 U/L (ref 12–78)
AST SERPL W P-5'-P-CCNC: 11 U/L (ref 15–37)
BUN BLD-MCNC: 7 MG/DL (ref 7–18)
GLUCOSE SERPLBLD-MCNC: 94 MG/DL (ref 74–106)
HCT VFR BLD CALC: 39 % (ref 36–45)
INR BLD: 1.01
LYMPHOCYTES # SPEC AUTO: 1.2 K/UL (ref 0.7–4.9)
MAGNESIUM SERPL-MCNC: 2 MG/DL (ref 1.8–2.4)
NT-PROBNP SERPL-MCNC: 95 PG/ML (ref ?–125)
PMV BLD: 8.5 FL (ref 7.6–11.3)
POTASSIUM SERPL-SCNC: 3.6 MMOL/L (ref 3.5–5.1)
RBC # BLD: 4.3 M/UL (ref 3.86–4.86)
TROPONIN (EMERG DEPT USE ONLY): < 0.02 NG/ML (ref 0–0.04)
TSH SERPL DL<=0.05 MIU/L-ACNC: 2.01 UIU/ML (ref 0.36–3.74)

## 2021-10-08 PROCEDURE — 80076 HEPATIC FUNCTION PANEL: CPT

## 2021-10-08 PROCEDURE — 84443 ASSAY THYROID STIM HORMONE: CPT

## 2021-10-08 PROCEDURE — 80048 BASIC METABOLIC PNL TOTAL CA: CPT

## 2021-10-08 PROCEDURE — 36415 COLL VENOUS BLD VENIPUNCTURE: CPT

## 2021-10-08 PROCEDURE — 84484 ASSAY OF TROPONIN QUANT: CPT

## 2021-10-08 PROCEDURE — 71045 X-RAY EXAM CHEST 1 VIEW: CPT

## 2021-10-08 PROCEDURE — 83880 ASSAY OF NATRIURETIC PEPTIDE: CPT

## 2021-10-08 PROCEDURE — 85610 PROTHROMBIN TIME: CPT

## 2021-10-08 PROCEDURE — 85025 COMPLETE CBC W/AUTO DIFF WBC: CPT

## 2021-10-08 PROCEDURE — 99284 EMERGENCY DEPT VISIT MOD MDM: CPT

## 2021-10-08 PROCEDURE — 96374 THER/PROPH/DIAG INJ IV PUSH: CPT

## 2021-10-08 PROCEDURE — 93005 ELECTROCARDIOGRAM TRACING: CPT

## 2021-10-08 PROCEDURE — 83735 ASSAY OF MAGNESIUM: CPT

## 2021-10-08 PROCEDURE — 96375 TX/PRO/DX INJ NEW DRUG ADDON: CPT

## 2021-10-08 PROCEDURE — 87804 INFLUENZA ASSAY W/OPTIC: CPT

## 2021-10-08 NOTE — ER
Nurse's Notes                                                                                     

 [facility]                                                                 

Name: Ashley Ayala                                                                             

Age: 46 yrs                                                                                       

Sex: Female                                                                                       

: 1975                                                                                   

MRN: [id number]                                                                                   

Arrival Date: 10/08/2021                                                                          

Time: 08:16                                                                                       

Account#: [id number]                                                                            

Bed 13                                                                                            

Private MD:                                                                                       

Diagnosis: Chest pain, unspecified;Vomiting;Diarrhea, unspecified                                 

                                                                                                  

Presentation:                                                                                     

10/08                                                                                             

08:23 Chief complaint: Patient states: Cough, chest pain, fast HR, blurred vision since       ll1 

      Tuesday. No fever. Coronavirus screen: Vaccine status: Patient reports being                

      unvaccinated. Client denies travel out of the [place] in the last 14 days. cough unrelated     

      to allergies, diarrhea, difficulty breathing, nausea, shortness of breath, vomiting.        

      Client presents with at least one sign or symptom that may indicate coronavirus-19.         

      Standard/surgical mask placed on the client. Ebola Screen: Patient denies travel to an      

      Ebola-affected area in the 21 days before illness onset. Initial Sepsis Screen: Does        

      the patient meet any 2 criteria? No. Patient's initial sepsis screen is negative. Does      

      the patient have a suspected source of infection? Yes: Productive cough/pneumonia. Risk     

      Assessment: Do you want to hurt yourself or someone else? Patient reports no desire to      

      harm self or others. Onset of symptoms was 2021.                                

08:23 Method Of Arrival: Ambulatory                                                           ll1 

08:23 Acuity: GURVINDER 3                                                                           ll1 

                                                                                                  

Historical:                                                                                       

- Allergies:                                                                                      

08:23 Codeine;                                                                                ll1 

08:23 Hydrocodone-Acetaminophen;                                                              ll1 

08:23 PENICILLINS;                                                                            ll1 

- PMHx:                                                                                           

08:23 None;                                                                                   ll1 

- PSHx:                                                                                           

08:23 Cholecystectomy; tubal pregnancy;                                                       ll1 

                                                                                                  

- Immunization history:: Client reports having NOT received the Covid vaccine. Flu                

  vaccine is not up to date.                                                                      

- Social history:: Smoking status: Patient reports the use of cigarette tobacco                   

  products, smokes one-half pack cigarettes per day.                                              

                                                                                                  

                                                                                                  

Assessment:                                                                                       

08:47 General: Appears in no apparent distress. Behavior is calm, cooperative, appropriate    tc5 

      for age. Pain: Complains of pain in chest. Neuro: No deficits noted. Cardiovascular:        

      Reports chest pain, since Chest pain and non productive cough since Tuesday.                

      Respiratory: No deficits noted. GI: upset stomach and nausea, decreased appetite since      

      tuesday. : No deficits noted.                                                             

                                                                                                  

Vital Signs:                                                                                      

08:23  / 84; Pulse 81; Resp 18; Temp 97.6; Pulse Ox 99% ; Weight 92.99 kg; Height 5 ft. ll1 

      5 in. (165.10 cm); Pain 7/10;                                                               

12:07  / 72; Pulse 55; Resp 16; Pulse Ox 97% ;                                          tc5 

08:23 Body Mass Index 34.11 (92.99 kg, 165.10 cm)                                             1 

                                                                                                  

ED Course:                                                                                        

08:16 Patient arrived in ED.                                                                  rg4 

08:17 Elle Ponce, RN is Primary Nurse.                                                tc5 

08:22 Ken Mccoy, MELISSA is PHCP.                                                           pm1 

08:22 Cj Loco MD is Attending Physician.                                                pm1 

08:22 Arm band placed on Patient placed in an exam room, on a stretcher.                      ll1 

08:25 Triage completed.                                                                       ll1 

08:37 Patient has correct armband on for positive identification. Placed in gown. Bed in low  mh5 

      position. Call light in reach. Side rails up X 1. Warm blanket given. Pillow given.         

      Cardiac monitor on. Pulse ox on. NIBP on.                                                   

08:43 Basic Metabolic Panel Sent.                                                             mh5 

08:43 CBC with Diff Sent.                                                                     5 

08:43 LFT's Sent.                                                                             5 

08:43 Magnesium Sent.                                                                         5 

08:43 NT PRO-BNP Sent.                                                                        5 

08:44 PT-INR Sent.                                                                            5 

08:44 Troponin (emerg Dept Use Only) Sent.                                                    5 

08:44 EKG done, COVID swab sent to lab. Flu and/or RSV swab sent to lab. Strep swab sent to   [facility] 

      lab. Inserted saline lock: 20 gauge in right forearm, using aseptic technique. Blood        

      collected.                                                                                  

08:48 Inserted saline lock: 20 gauge in right antecubital area, using aseptic technique.      tc5 

      Blood collected.                                                                            

10:04 XRAY Chest (1 view) In Process Unspecified.                                             EDMS

12:17 IV discontinued, intact, bleeding controlled, No redness/swelling at site. Pressure     tc5 

      dressing applied.                                                                           

                                                                                                  

Administered Medications:                                                                         

11:46 Drug: Ketorolac 30 mg Route: IVP; Site: right antecubital;                              tc5 

12:08 Follow up: Response: No adverse reaction                                                tc5 

11:46 Drug: Zofran (Ondansetron) 4 mg Route: IVP; Site: right antecubital;                    tc5 

12:07 Follow up:  / 72; Pulse 55 bpm; Resp 16 bpm; Pulse Ox 97%                         tc5 

                                                                                                  

                                                                                                  

Outcome:                                                                                          

11:25 Discharge ordered by MD.                                                                pm1 

12:33 Patient left the ED.                                                                    tc5 

                                                                                                  

Signatures:                                                                                       

Dispatcher [employer]                           EDMS                                                 

Ken Mccoy NP                    NP   pm1                                                  

Araseli Drake Maria                              5                                                  

Jody Joshi RN                       RN   1                                                  

Elle Ponce RN RN   tc5                                                  

                                                                                                  

**************************************************************************************************

## 2021-10-08 NOTE — RAD REPORT
EXAM DESCRIPTION:  RAD - Chest Single View - 10/8/2021 10:04 am

 

CLINICAL HISTORY:  PAIN

Chest pain.

 

COMPARISON:  Chest Single View dated 9/11/2018; Chest Single View dated 11/1/2017

 

FINDINGS:  Portable technique limits examination quality.

 

The lungs are grossly clear. The heart is normal in size. No displaced fractures.

 

IMPRESSION:  No acute intrathoracic process suspected.

## 2021-10-08 NOTE — EDPHYS
Physician Documentation                                                                           

 [facility]                                                                 

Name: Ashley Ayala                                                                             

Age: 46 yrs                                                                                       

Sex: Female                                                                                       

: 1975                                                                                   

MRN: [id number]                                                                                   

Arrival Date: 10/08/2021                                                                          

Time: 08:16                                                                                       

Account#: [id number]                                                                            

Bed 13                                                                                            

Private MD:                                                                                       

ED Physician Cj Loco                                                                         

HPI:                                                                                              

10/08                                                                                             

09:02 This 46 yrs old  Female presents to ER via Ambulatory with complaints of Chest pm1 

      Pain.                                                                                       

09:02 The patient or guardian reports chest pain that is located primarily in the right       pm1 

      breast and left breast. Onset: 3 day(s) ago. The pain does not radiate. Associated          

      signs and symptoms: Pertinent positives: cough, nausea, vomiting, Diarrhea, Pertinent       

      negatives: abdominal pain, diaphoresis, dizziness, headache, shortness of breath. The       

      chest pain is described as sharp. Duration: The patient or guardian reports a single        

      episode, that is still ongoing. Modifying factors: the symptoms are aggravated by deep      

      breath, eating, palpation of area. Severity of pain: in the emergency department the        

      pain is unchanged. The patient has not experienced similar symptoms in the past. The        

      patient has not recently seen a physician.                                                  

                                                                                                  

Historical:                                                                                       

- Allergies:                                                                                      

08:23 Codeine;                                                                                ll1 

08:23 Hydrocodone-Acetaminophen;                                                              ll1 

08:23 PENICILLINS;                                                                            ll1 

- PMHx:                                                                                           

08:23 None;                                                                                   ll1 

- PSHx:                                                                                           

08:23 Cholecystectomy; tubal pregnancy;                                                       ll1 

                                                                                                  

- Immunization history:: Client reports having NOT received the Covid vaccine. Flu                

  vaccine is not up to date.                                                                      

- Social history:: Smoking status: Patient reports the use of cigarette tobacco                   

  products, smokes one-half pack cigarettes per day.                                              

                                                                                                  

                                                                                                  

ROS:                                                                                              

09:02 Back: Negative for injury and pain, : Negative for injury, bleeding, discharge, and   pm1 

      swelling, MS/Extremity: Negative for injury and deformity, Skin: Negative for injury,       

      rash, and discoloration, Neuro: Negative for headache, weakness, numbness, tingling,        

      and seizure.                                                                                

09:02 Constitutional: Positive for poor PO intake, For the past 3 days.                           

09:02 ENT: Positive for rhinorrhea, Negative for ear pain, sore throat.                           

09:02 Cardiovascular: Positive for chest pain, palpitations.                                      

09:02 Respiratory: Positive for cough.                                                            

09:02 Abdomen/GI: Positive for nausea, vomiting, and diarrhea, Negative for abdominal pain,       

      constipation.                                                                               

09:02 All other systems are negative.                                                             

                                                                                                  

Exam:                                                                                             

09:02 Constitutional:  This is a well developed, well nourished patient who is awake, alert,  pm1 

      and in no acute distress. Head/Face:  Normocephalic, atraumatic.                            

09:02 Skin:  Warm, dry with normal turgor.  Normal color with no rashes, no lesions, and no       

      evidence of cellulitis. MS/ Extremity:  Pulses equal, no cyanosis.  Neurovascular           

      intact.  Full, normal range of motion.                                                      

09:02 Eyes: Exam is negative for acute changes, Conjunctiva: no acute changes, no injection,      

      Sclera: no acute changes, icterus, is not appreciated.                                      

09:02 ENT: Exam is negative for acute changes, Mouth: Lips: normal, moist, Oral mucosa:           

      normal, pink and intact, moist.                                                             

09:02 Chest/axilla: Palpation: tenderness, that is mild, of the  anterior aspect of left          

      upper chest and right breast, that totally reproduces the patient's complaints.             

09:02 Cardiovascular: Rate: bradycardic, Rhythm: regular, Pulses: no pulse deficits are           

      appreciated, Heart sounds: normal, normal S1and S2, Edema: is not appreciated.              

09:02 Respiratory: Exam negative for  acute changes, the patient does not display signs of        

      respiratory distress, Breath sounds: are clear throughout.                                  

09:02 Abdomen/GI: Inspection: abdomen appears normal, Palpation: abdomen is soft and              

      non-tender, in all quadrants.                                                               

09:02 Neuro: Exam negative for acute changes, Orientation: is normal, Mentation: is normal,       

      Motor: is normal, moves all fours.                                                          

                                                                                                  

Vital Signs:                                                                                      

08:23  / 84; Pulse 81; Resp 18; Temp 97.6; Pulse Ox 99% ; Weight 92.99 kg; Height 5 ft. ll1 

      5 in. (165.10 cm); Pain 7/10;                                                               

12:07  / 72; Pulse 55; Resp 16; Pulse Ox 97% ;                                          tc5 

08:23 Body Mass Index 34.11 (92.99 kg, 165.10 cm)                                             ll1 

                                                                                                  

MDM:                                                                                              

08:36 Patient medically screened.                                                             pm1 

11:04 Data reviewed: vital signs. Data interpreted: Pulse oximetry: on room air is 99 %.      pm1 

      Interpretation: normal. Counseling: I had a detailed discussion with the patient and/or     

      guardian regarding: the historical points, exam findings, and any diagnostic results        

      supporting the discharge/admit diagnosis, lab results, radiology results, the need for      

      outpatient follow up, to return to the emergency department if symptoms worsen or           

      persist or if there are any questions or concerns that arise at home.                       

                                                                                                  

10/08                                                                                             

08:41 Order name: Basic Metabolic Panel; Complete Time: 09:36                                 pm1 

10/08                                                                                             

08:41 Order name: CBC with Diff; Complete Time: 09:36                                         pm1 

10/08                                                                                             

08:41 Order name: LFT's; Complete Time: 09:36                                                 pm1 

10/08                                                                                             

08:41 Order name: Magnesium; Complete Time: 09:36                                             pm1 

10/08                                                                                             

08:41 Order name: NT PRO-BNP; Complete Time: 09:36                                            pm1 

10/08                                                                                             

08:41 Order name: PT-INR; Complete Time: 09:36                                                pm1 

10/08                                                                                             

08:41 Order name: Troponin (emerg Dept Use Only); Complete Time: 09:36                        pm1 

10/08                                                                                             

08:41 Order name: XRAY Chest (1 view); Complete Time: 10:21                                   pm1 

10/08                                                                                             

08:41 Order name: TSH; Complete Time: 09:36                                                   pm1 

10/08                                                                                             

08:41 Order name: Flu                                                                         pm1 

10/08                                                                                             

09:06 Order name: SARS-COV-2 RT PCR; Complete Time: 10:21                                     EDMS

10/08                                                                                             

08:41 Order name: EKG; Complete Time: 08:41                                                   pm1 

10/08                                                                                             

08:41 Order name: Cardiac monitoring; Complete Time: 08:43                                    pm1 

10/08                                                                                             

08:41 Order name: EKG - Nurse/Tech; Complete Time: 08:43                                      pm1 

10/08                                                                                             

08:41 Order name: IV Saline Lock; Complete Time: 08:43                                        pm1 

10/08                                                                                             

08:41 Order name: Labs collected and sent; Complete Time: 08:43                               pm1 

10/08                                                                                             

08:41 Order name: O2 Per Protocol; Complete Time: 08:43                                       pm1 

10/08                                                                                             

08:41 Order name: O2 Sat Monitoring; Complete Time: 08:43                                     pm1 

10/08                                                                                             

08:41 Order name: Urine Dipstick-Ancillary (obtain specimen)                                  pm1 

10/08                                                                                             

08:41 Order name: Urine Pregnancy Test (obtain specimen)                                      pm1 

                                                                                                  

Administered Medications:                                                                         

11:46 Drug: Ketorolac 30 mg Route: IVP; Site: right antecubital;                              tc5 

12:08 Follow up: Response: No adverse reaction                                                tc5 

11:46 Drug: Zofran (Ondansetron) 4 mg Route: IVP; Site: right antecubital;                    tc5 

12:07 Follow up:  / 72; Pulse 55 bpm; Resp 16 bpm; Pulse Ox 97%                         tc5 

                                                                                                  

                                                                                                  

Disposition:                                                                                      

17:32 Co-signature as Attending Physician, Cj Loco MD I agree with the assessment and     rn  

      plan of care. Attestation: The patient's history, exam findings, diagnostics, and a         

      summary of any interventions or procedures was reviewed in detail with Ken Mccoy NP.                                                                                         

                                                                                                  

Disposition Summary:                                                                              

10/08/21 11:25                                                                                    

Discharge Ordered                                                                                 

      Location: Home                                                                          pm1 

      Problem: new                                                                            pm1 

      Symptoms: have improved                                                                 pm1 

      Condition: Stable                                                                       pm1 

      Diagnosis                                                                                   

        - Chest pain, unspecified                                                             pm1 

        - Vomiting                                                                            pm1 

        - Diarrhea, unspecified                                                               pm1 

      Followup:                                                                               pm1 

        - With: Emergency Department                                                               

        - When: As needed                                                                          

        - Reason: Worsening of condition                                                           

      Followup:                                                                               pm1 

        - With: Private Physician                                                                  

        - When: 2 - 3 days                                                                         

        - Reason: Recheck today's complaints, Continuance of care, Re-evaluation by your           

      physician                                                                                   

      Discharge Instructions:                                                                     

        - Discharge Summary Sheet                                                             pm1 

        - Food Choices to Help Relieve Diarrhea, Adult                                        pm1 

        - Nonspecific Chest Pain, Adult                                                       pm1 

        - Diarrhea, Adult                                                                     pm1 

        - Viral Gastroenteritis, Adult                                                        pm1 

      Forms:                                                                                      

        - Medication Reconciliation Form                                                      pm1 

        - Thank You Letter                                                                    pm1 

        - Antibiotic Education                                                                pm1 

        - Prescription Opioid Use                                                             pm1 

      Prescriptions:                                                                              

        - Zofran 4 mg Oral Tablet                                                                  

            - take 1 tablet by ORAL route every 8 hours As needed; 20 tablet; Refills: 0,     pm1 

      Product Selection Permitted                                                                 

Signatures:                                                                                       

Dispatcher [employer]                           EDMS                                                 

Cj Loco MD MD rn Marinas, Patrick, NP                    NP   pm1                                                  

Jody Joshi RN                       RN   ll1                                                  

Elle Ponce RN                  RN   tc5                                                  

                                                                                                  

Corrections: (The following items were deleted from the chart)                                    

09:06 08:41 CORONAVIRUS+MR.LAB.BRZ ordered. EDMS                                              EDMS

                                                                                                  

**************************************************************************************************

## 2022-02-16 ENCOUNTER — HOSPITAL ENCOUNTER (EMERGENCY)
Dept: HOSPITAL 97 - ER | Age: 47
Discharge: ANOTHER HOSPITAL | End: 2022-02-16
Payer: SELF-PAY

## 2022-02-16 VITALS — SYSTOLIC BLOOD PRESSURE: 108 MMHG | DIASTOLIC BLOOD PRESSURE: 60 MMHG

## 2022-02-16 VITALS — TEMPERATURE: 97.5 F

## 2022-02-16 VITALS — OXYGEN SATURATION: 99 %

## 2022-02-16 DIAGNOSIS — Z88.5: ICD-10-CM

## 2022-02-16 DIAGNOSIS — Z88.0: ICD-10-CM

## 2022-02-16 DIAGNOSIS — M50.10: Primary | ICD-10-CM

## 2022-02-16 LAB
ALBUMIN SERPL BCP-MCNC: 3 G/DL (ref 3.4–5)
ALP SERPL-CCNC: 94 U/L (ref 45–117)
ALT SERPL W P-5'-P-CCNC: 13 U/L (ref 12–78)
AST SERPL W P-5'-P-CCNC: 9 U/L (ref 15–37)
BUN BLD-MCNC: 9 MG/DL (ref 7–18)
GLUCOSE SERPLBLD-MCNC: 87 MG/DL (ref 74–106)
HCT VFR BLD CALC: 35 % (ref 36–45)
INR BLD: 0.87
LYMPHOCYTES # SPEC AUTO: 1.3 K/UL (ref 0.7–4.9)
MAGNESIUM SERPL-MCNC: 2.4 MG/DL (ref 1.8–2.4)
NT-PROBNP SERPL-MCNC: 197 PG/ML (ref ?–125)
PMV BLD: 8.6 FL (ref 7.6–11.3)
POTASSIUM SERPL-SCNC: 4.2 MMOL/L (ref 3.5–5.1)
RBC # BLD: 3.77 M/UL (ref 3.86–4.86)
TROPONIN I SERPL HS-MCNC: 5.4 PG/ML (ref ?–58.9)

## 2022-02-16 PROCEDURE — 36415 COLL VENOUS BLD VENIPUNCTURE: CPT

## 2022-02-16 PROCEDURE — 72141 MRI NECK SPINE W/O DYE: CPT

## 2022-02-16 PROCEDURE — 83735 ASSAY OF MAGNESIUM: CPT

## 2022-02-16 PROCEDURE — 85025 COMPLETE CBC W/AUTO DIFF WBC: CPT

## 2022-02-16 PROCEDURE — 80048 BASIC METABOLIC PNL TOTAL CA: CPT

## 2022-02-16 PROCEDURE — 72125 CT NECK SPINE W/O DYE: CPT

## 2022-02-16 PROCEDURE — 93005 ELECTROCARDIOGRAM TRACING: CPT

## 2022-02-16 PROCEDURE — 85610 PROTHROMBIN TIME: CPT

## 2022-02-16 PROCEDURE — 83880 ASSAY OF NATRIURETIC PEPTIDE: CPT

## 2022-02-16 PROCEDURE — 80076 HEPATIC FUNCTION PANEL: CPT

## 2022-02-16 PROCEDURE — 71045 X-RAY EXAM CHEST 1 VIEW: CPT

## 2022-02-16 PROCEDURE — 84484 ASSAY OF TROPONIN QUANT: CPT

## 2022-02-16 NOTE — ER
Nurse's Notes                                                                                     

 [facility]                                                                 

Name: Ashley Ayala                                                                             

Age: 46 yrs                                                                                       

Sex: Female                                                                                       

: 1975                                                                                   

MRN: [id number]                                                                                   

Arrival Date: 2022                                                                          

Time: 09:58                                                                                       

Account#: [id number]                                                                            

Bed 5                                                                                             

Private MD:                                                                                       

Diagnosis: Cervical disc disorder with radiculopathy;Weakness-Right upper extremity               

                                                                                                  

Presentation:                                                                                     

                                                                                             

10:00 Chief complaint: Patient states: chest pain, left arm pain X1 week radiating up to the  jg9 

      shoulder and into the r chest, denied any recent injury or new bedding, no cardiac Hx       

      reported, sob reported. Coronavirus screen: Vaccine status: Patient reports being           

      unvaccinated. Ebola Screen: Patient negative for fever greater than or equal to 101.5       

      degrees Fahrenheit, and additional compatible Ebola Virus Disease symptoms Patient          

      denies exposure to infectious person. Patient denies travel to an Ebola-affected area       

      in the 21 days before illness onset. Initial Sepsis Screen: Does the patient meet any 2     

      criteria? No. Patient's initial sepsis screen is negative. Does the patient have a          

      suspected source of infection? No. Patient's initial sepsis screen is negative. Risk        

      Assessment: Do you want to hurt yourself or someone else? Patient reports no desire to      

      harm self or others. Note 1 week ago.                                                       

10:00 Method Of Arrival: Ambulatory                                                           jg9 

10:00 Acuity: GURVINDER 3                                                                           jg9 

19:28 Onset of symptoms was 2022.                                                jd3 

                                                                                                  

Triage Assessment:                                                                                

10:00 General: Appears uncomfortable, Behavior is calm. Pain: Complains of pain in chest,     jg9 

      left arm and left hand. Neuro: Reports numbness in left hand and left arm paresthesias.     

      Cardiovascular: Rhythm is sinus bradycardia Chest pain is described as Pain is 7 out of     

      10 on a pain scale.                                                                         

                                                                                                  

Historical:                                                                                       

- Allergies:                                                                                      

10:23 Codeine;                                                                                jg9 

10:23 Hydrocodone-Acetaminophen;                                                              jg9 

10:23 PENICILLINS;                                                                            jg9 

- PSHx:                                                                                           

10:23 Cholecystectomy; tubal pregnancy;                                                       jg9 

                                                                                                  

- Immunization history:: Client reports having NOT received the Covid vaccine.                    

  Pneumococcal vaccine is not up to date, Flu vaccine is up to date.                              

- Social history:: Smoking status: Patient reports the use of cigarette tobacco                   

  products, smokes one-half pack cigarettes per day.                                              

                                                                                                  

                                                                                                  

Screening:                                                                                        

10:00 Abuse screen: Denies threats or abuse. Denies injuries from another. Nutritional        jg9 

      screening: No deficits noted. Tuberculosis screening: No symptoms or risk factors           

      identified. Fall Risk None identified.                                                      

                                                                                                  

Assessment:                                                                                       

10:25 Pain: Pain radiates to chest Pain began 2-3 days ago.                                   jg9 

10:37 General: Appears uncomfortable, Behavior is calm, cooperative. Pain: Complains of pain  ww  

      in chest and left arm Pain radiates to base of the skull and chest. Neuro: Level of         

      Consciousness is awake, alert, obeys commands, Oriented to person, place, time,             

      situation,  are equal bilaterally Moves all extremities. Speech is normal,             

      Tingling in left arm. Cardiovascular: Capillary refill < 3 seconds Patient's skin is        

      warm and dry. Respiratory: Airway is patent Respiratory effort is even, unlabored,          

      Respiratory pattern is regular, symmetrical. GI: No signs and/or symptoms were reported     

      involving the gastrointestinal system. : No signs and/or symptoms were reported           

      regarding the genitourinary system. Derm: Skin is intact, is healthy with good turgor,      

      Skin is pink, warm \T\ dry. Musculoskeletal: Reports weakness in left arm numbness in       

      left arm pain in left arm.                                                                  

11:35 Reassessment: Patient appears in no apparent distress at this time. No changes from       

      previously documented assessment. Patient and/or family updated on plan of care and         

      expected duration. Pain level reassessed. Patient is alert, oriented x 3, equal             

      unlabored respirations, skin warm/dry/pink.                                                 

12:00 Reassessment: No changes from previously documented assessment.                         ke1 

12:01 Reassessment: Patient appears in no apparent distress at this time. No changes from     ww  

      previously documented assessment. Patient and/or family updated on plan of care and         

      expected duration. Pain level reassessed. Patient is alert, oriented x 3, equal             

      unlabored respirations, skin warm/dry/pink.                                                 

13:23 Reassessment: Patient appears in no apparent distress at this time. No changes from       

      previously documented assessment. Patient and/or family updated on plan of care and         

      expected duration. Pain level reassessed.                                                   

14:32 Reassessment: Patient appears in no apparent distress at this time. Patient and/or      jd3 

      family updated on plan of care and expected duration. Pain level reassessed. Patient is     

      alert, oriented x 3, equal unlabored respirations, skin warm/dry/pink.                      

15:39 Reassessment: Patient appears in no apparent distress at this time. No changes from     ww  

      previously documented assessment. Patient and/or family updated on plan of care and         

      expected duration. Pain level reassessed. Patient is alert, oriented x 3, equal             

      unlabored respirations, skin warm/dry/pink.                                                 

19:27 Reassessment: Patient appears in no apparent distress at this time. Patient and/or      jd3 

      family updated on plan of care and expected duration. Pain level reassessed. Patient is     

      alert, oriented x 3, equal unlabored respirations, skin warm/dry/pink.                      

                                                                                                  

Vital Signs:                                                                                      

10:00  / 72 LA; Pulse 51 LA; Resp 17; Temp 97.5; Pulse Ox 99% on R/A; Weight 88 kg (R); jg9 

      Height 5 ft. 5 in. (165.10 cm) (R);                                                         

11:45  / 60; Pulse 51; Resp 17; Pulse Ox 100% ;                                         ww  

12:30  / 70; Pulse 55; Resp 14; Pulse Ox 100% ;                                         ww  

13:23  / 69; Pulse 49; Resp 16; Pulse Ox 97% ;                                          ww  

14:32  / 76; Pulse 48; Resp 16 S; Pulse Ox 98% on R/A;                                  jd3 

15:39  / 65; Pulse 49; Resp 16; Pulse Ox 99% on R/A;                                    ww  

16:54  / 73; Pulse 55; Resp 18; Pulse Ox 99% on R/A;                                    ld1 

19:27  / 60; Pulse 55; Resp 17 S; Pulse Ox 99% on R/A;                                  jd3 

10:00 Body Mass Index 32.28 (88.00 kg, 165.10 cm)                                             jg9 

                                                                                                  

ED Course:                                                                                        

09:58 Patient arrived in ED.                                                                  mr  

09:59 Norberto Yeung MD is Attending Physician.                                              kdr 

10:21 Zachary Gallegos RN is Primary Nurse.                                                  jd3 

10:23 Triage completed.                                                                       jg9 

10:25 Basic Metabolic Panel Sent.                                                             ww  

10:25 LFT's Sent.                                                                             ww  

10:25 Arm band placed on right wrist.                                                         jg9 

10:25 Patient has correct armband on for positive identification. Bed in low position. Call   jg9 

      light in reach. Side rails up X 1. Cardiac monitor on.                                      

10:26 Patient maintains SpO2 saturation greater than 95% on room air.                         jg9 

10:37 CT C Spine In Process Unspecified.                                                      EDMS

10:37 Inserted saline lock: 20 gauge in right antecubital area, using aseptic technique.      ww  

10:49 XRAY Chest (1 view) In Process Unspecified.                                             EDMS

13:54 C Spine Wo Cont In Process Unspecified.                                                 EDMS

15:05 initiated transfer to [facility], pt was denied due to no beds at this time,   bd  

      per JJ.                                                                                     

15:19 initiated transfer to [facility], pt denied at [place] due to being on transfer  bd  

      closure.                                                                                    

16:57 initiated transfer to [facility].                                                   bd  

17:11 pt accepted in transfer to [facility] by dr Stanton, admin approval given by Kathie Lehman.                                                                                      

19:28 No provider procedures requiring assistance completed. Patient transferred, IV remains  jd3 

      in place.                                                                                   

                                                                                                  

Administered Medications:                                                                         

10:39 Drug: Ketorolac 15 mg Route: IVP; Site: right antecubital;                              ww  

11:30 Follow up: Response: No adverse reaction                                                jd3 

10:39 Drug: SOLU-Medrol (methylPrednisoLONE) 125 mg Route: IVP; Site: right antecubital;      ww  

11:30 Follow up: Response: No adverse reaction                                                jd3 

10:40 Drug: Robaxin (methocarbamol) 1 grams Route: IVPB; Infused Over: 1 hrs; Site: right     ww  

      antecubital;                                                                                

11:59 Follow up: IV Status: Completed infusion                                                ke1 

12:44 Not Given (Allergic , md notifiedd): Norco (HYDROcodone-acetaminophen) 10 mg-325 mg 1   ke1 

      tabs PO once; RASS on ADMIN: Combtv4, Very Agttd3, Agttd2, Rstlss1, AlertClm0, Drwsy-1,     

      Lt Sdtn-2, Mod Sdtn-3, Dp Sdtn-4, UnArsble-5                                                

12:48 Drug: morphine 4 mg Route: IVP; Site: right antecubital;                                ke1 

13:40 Follow up: Response: No adverse reaction; RASS: Alert and Calm (0)                      jd3 

19:25 Drug: morphine 4 mg Route: IVP; Site: right antecubital;                                jd3 

19:26 Follow up: Response: No adverse reaction; RASS: Alert and Calm (0)                      jd3 

                                                                                                  

                                                                                                  

Outcome:                                                                                          

17:10 ER care complete, transfer ordered by MD.                                               kdr 

19:28 Transferred by ground EMS to [facility], Transfer form          jd3 

      completed. X-rays sent w/ patient.                                                          

19:28 Condition: stable                                                                           

19:28 Instructed on the need for transfer.                                                        

19:28 Patient left the ED.                                                                    jd3 

                                                                                                  

Signatures:                                                                                       

Dispatcher [employer]                           EDMS                                                 

Jocelyne Busch Kevin, MD MD kdr                                                  

ToledoAna Laura                                 mr Gallegos, Zachary, RN                    RN   jd3                                                  

Kathie Westbrook RN                     RN   ld1                                                  

Heaven Garcia RN                   RN   jg9                                                  

Josee Reynolds RN                       RN   ww                                                   

Gibson Sifuentes RN                   RN   ke1                                                  

                                                                                                  

Corrections: (The following items were deleted from the chart)                                    

16:16 15:38 paiged ortho spine surgeon, Dr Wyatt Partida II, md. bd                             bd  

                                                                                                  

**************************************************************************************************

## 2022-02-16 NOTE — RAD REPORT
EXAM DESCRIPTION:  [place] Single View2/16/2022 10:49 am

 

CLINICAL HISTORY:  Chest pain

 

COMPARISON:  2021

 

FINDINGS:   The lungs appear clear of acute infiltrate. The heart is normal size

 

IMPRESSION:   No acute abnormalities displayed

## 2022-02-16 NOTE — RAD REPORT
EXAM DESCRIPTION:  CT - C Spine Wo Con - 2/16/2022 10:38 am

 

CLINICAL HISTORY:  Numbness and radiculopathy

 

COMPARISON:  None.

 

TECHNIQUE:  Computed axial tomography of the cervical spine were obtained with sagittal and coronal r
econstruction images generated and reviewed.

 

All CT scans are performed using dose optimization technique as appropriate and may include automated
 exposure control or mA/KV adjustment according to patient size.

 

FINDINGS:  A cervical fracture is not seen.

 

Congenital nonunion posterior elements C1

 

No dislocation

 

Disc bulge and osteophytes C6-7. Disc space narrowing. Mild to moderate narrowing of the neural joao
oscar bilaterally. The thecal sac measures approximately 7.5 millimeters

 

Spondylosis C5-6 results in mild narrowing left neural foramina

 

 

IMPRESSION:  A cervical fracture is not seen.

 

Spondylosis most marked C6-7 resulting in mild to moderate central and bilateral foraminal stenosis.

 

If the patient continues have symptoms to suggest spinal cord/spinal canal pathology then MRI would b
e recommended.

## 2022-02-16 NOTE — XMS REPORT
Continuity of Care Document

                          Created on:2022



Patient:HOMER KING

Sex:Female

:1975

External Reference #:[id number]





Demographics







                          Address                    [address] 



                                                    [place] [address]

 

                          Home Phone                [phone]

 

                          Email Address             [email or web address]

 

                          Preferred Language        Unknown

 

                          Marital Status            Unknown

 

                          Zoroastrianism Affiliation     Unknown

 

                          Race                      Unknown

 

                          Additional Race(s)        Unavailable

 

                          Ethnic Group              Unknown









Author







                          Organization              [facility]

t

 

                          Address                   [address]



                                                    [place] [address]

 

                          Phone                     [phone]









Care Team Providers







                    Name                Role                Phone

 

                    Manuelito WILLIAMSON,  N    Attending Clinician [phone]

 

                    HOSSEIN  C        Attending Clinician Unavailable

 

                    Visit,  Nurse       Attending Clinician Unavailable

 

                    Fan WILLIAMSON,  R      Attending Clinician [phone]

 

                    Akinsipe WHKARLA,  C  Attending Clinician [phone]









Payers







           Payer Name Policy Type Policy Number Effective Date Expiration Date S

ource







Advance Directives







           Directive  Decision   Effective  Termination Comments   Source



                                 Date       Date                  

 

           Healthcare Agents on N/A                                         [facility]

[facility]



           FileNameRelationshipHealthcare                                       

      of [place]



           Agent                                                  Medical



           RelationshipCommunicationPatty                                       

      Branch



           [facility]                                             



           [phone]                                             



           (Mobile)[phone] (Home)                                          

   







Problems







       Condition Condition Condition Status Onset  Resolution Last   Treating Co

mments 

Source



       Name   Details Category        Date   Date   Treatment Clinician        



                                                 Date                 

 

       Abnormalit Abnormalit Disease Active                              U

nivers



       y of right y of right               604                               it

y of



       breast on breast on               00:00:                             [place]

[place]



       screening screening               00                                 Medi

allyson



       mammogram mammogram                                                  Bran

ch

 

       Right  Right  Disease Active                       Overview: Univer

s



       ovarian ovarian                                       1.8 x 1.8 ity o

f



       cyst   cyst                 00:00:                      x 2.1 cm [place]



                                   00                          right  Medical



                                                               ovarian Branch



                                                               hemorrhag 



                                                               ic cyst 



                                                               on 19 



                                                               pelvic US 

 

       Intramural Intramural Disease Active                       Overview

: Univers



       leiomyoma leiomyoma               -                        1 x 0.8 ity

 of



       of uterus of uterus               00:00:                      cm on  [place]

[place]



                                   00                          19 Medical



                                                               pelvic US Branch

 

       Screen for Screen for Disease Active                              U

nivers



       STD    STD                  5-29                               ity of



       (sexually (sexually               00:00:                             [place]

[place]



       transmitte transmitte               00                                 Me

dical



       d disease) d disease)                                                  Br

anch

 

       BMI    BMI    Disease Active                              Univers



       31.0-31.9, 31.0-31.9,               -                               it

y of



       adult  adult                00:00:                             [place]



                                   00                                 Medical



                                                                      Branch

 

       Positive Positive Disease Active                              Unive

rs



       depression depression                                              it

y of



       screening screening               00:00:                             [place]

[place]



                                   00                                 Medical



                                                                      Branch

 

       S/P tubal S/P tubal Disease Active                              Uni

vers



       ligation ligation                                              ity of



                                   00:00:                             [place]



                                   00                                 Medical



                                                                      Branch

 

       Trichomona Trichomona Disease Active                              U

nivers



       l      l                                                   ity of



       vulvovagin vulvovagin               00:00:                             [place]

[place]



       itis   itis                                                  Medical



                                                                      Branch

 

       Menorrhagi Menorrhagi Disease Active                              U

nivers



       a with a with                                              ity of



       regular regular               00:00:                             [place]



       cycle  cycle                                                 Medical



                                                                      Branch

 

       Enlarged Enlarged Disease Active                              Unive

rs



       uterus uterus                                              ity of



                                   00:00:                             [place]



                                   00                                 Medical



                                                                      Branch

 

       S/P tubal S/P tubal Disease Active                              Uni

vers



       ligation ligation                                              ity of



                                   00:00:                             [place]



                                                                    Medical



                                                                      Branch

 

       Menorrhagi Menorrhagi Disease Active                              U

nivers



       a with a with                                              ity of



       regular regular               00:00:                             [place]



       cycle  cycle                                                 Medical



                                                                      Branch







Allergies, Adverse Reactions, Alerts







       Allergy Allergy Status Severity Reaction(s) Onset  Inactive Treating Comm

ents 

Source



       Name   Type                        Date   Date   Clinician        

 

       Penicill Drug   Active        Hives                        Univers



       in     Allergy                                              ity of



                                          00:00:                      [place]



                                          00                          Medical



                                                                      Branch

 

       PENICILL DRUG   Active Med    Hives                        Univers



       IN     INGREDI                                              ity of



                                          00:00:                      [place]



                                          00                          Medical



                                                                      Branch

 

       Penicill Drug   Active        Hives                        Univers



       in     Allergy                                              ity of



                                          00:00:                      [place]



                                          00                          Medical



                                                                      Branch

 

       Amoxicil Propensi Active        Hives                        Univer

s



       frandy    ty to                       -                        ity of



              adverse                      00:00:                      [place]



              reaction                      00                          Medical



              s                                                       Branch

 

       AMOXICIL DRUG   Active        Hives                        Univers



       FRANDY    INGREDI                      -                        ity of



                                          00:00:                      [place]



                                          00                          Medical



                                                                      Branch

 

       Hydrocod Propensi Active        Nausea 2016                      Univer

s



       one-Acet ty to                and/or 2-21                        ity of



       aminophe adverse               Vomiting 00:00:                      [place]



       n      reaction                      00                          Medical



              s                                                       Branch

 

       HYDROCOD DRUG   Active        N/V    2016                      Univers



       ONE-ACET                             2-21                        ity of



       AMINOPHE                             00:00:                      [place]



       N                                  00                          Medical



                                                                      Branch

 

       Codeine Propensi Active        Rash   -                      Univers



              ty to                                               ity of



              adverse                      00:00:                      [place]



              reaction                      00                          Medical



              s                                                       Branch

 

       CODEINE DRUG   Active        Rash                         Univers



              INGREDI                      -                        ity of



                                          00:00:                      [place]



                                          00                          Medical



                                                                      Branch







Social History







           Social Habit Start Date Stop Date  Quantity   Comments   Source

 

           History of tobacco 1999            Cigarette Smoker            

University of



           use        00:00:00                                    [facility]

 

           Sex Assigned At                                             Universit

y of



           Birth                                                  [facility]

 

           Cigarettes smoked 2019-10-08 2019-10-08                       Univers

ity of



           current (pack per 00:00:00   00:00:00                         [facility]

edical



           day) - Reported                                             Branch

 

           Alcohol intake 2019-10-08 2019-10-08 Current drinker            Unive

rsity of



                      00:00:00   00:00:00   of alcohol            [facility]



                                            ([facility])             [place]

 

           Tobacco use and 2019-10-08 2019-10-08 Never used            Universit

y of



           exposure   00:00:00   00:00:00                         [facility]

 

           Alcohol Comment 2019 ocassional            Universit

y of



                      00:00:00   00:00:00                         [facility]









                Smoking Status  Start Date      Stop Date       Source

 

                Current every day smoker 2019-10-08 00:00:00                 Uni

versity of [facility]







Medications







       Ordered Filled Start  Stop   Current Ordering Indication Dosage Frequency

 Signature

                    Comments            Components          Source



     Medication Medication Date Date Medication? Clinician                (SIG) 

          



     Name Name                                                   

 

     LOESTRIN        2019- No        [id number] 1{tbl}      Take 1       

    Univers



     (MICROGESTI      5-29 -                          tablet by           it

y of



     N FE )      00:00: 00:00                          mouth           [place]



     1 mg-20 mcg      00   :00                           daily.           Medica

l



     (21)/75 mg                                                        Branch



     (7) tablet                                                        

 

     LOESTRIN       2019- No        [id number] 1{tbl}      Take 1       

    Univers



     (MICROGESTI      5-29 -29                          tablet by           it

y of



     N FE )      00:00: 00:00                          mouth           [place]



     1 mg-20 mcg      00   :00                           daily.           Medica

l



     (21)/75 mg                                                        Branch



     (7) tablet                                                        

 

     LOESTRIN        2019- No        [id number] 1{tbl}      Take 1       

    Univers



     (MICROGESTI      5-29 07-                          tablet by           it

y of



     N FE )      00:00: 00:00                          mouth           [place]



     1 mg-20 mcg      00   :00                           daily.           Medica

l



     (21)/75 mg                                                        Branch



     (7) tablet                                                        

 

     LOESTRIN FE       2019- No        [id number] 1{tbl}      Take 1       

    Univers



     (MICROGESTI      5--                          tablet by           it

y of



     N FE )      00:00: 00:00                          mouth           [place]



     1 mg-20 mcg      00   :00                           daily.           Medica

l



     (21)/75 mg                                                        Branch



     (7) tablet                                                        

 

     No known                No                                      Univers



     medications                                                        ity [facility]

 

     No known                No                                      Univers



     medications                                                        ity [place] [facility]

 

     No known                No                                      Univers



     medications                                                        [facility]

 

     No known                No                                      Univers



     medications                                                        [facility]







Vital Signs







             Vital Name   Observation Time Observation Value Comments     Source

 

             Systolic blood 2019 20:12:00 129 mm[Hg]                Univer

sity of



             [facility]

 

             Diastolic blood 2019 20:12:00 74 mm[Hg]                 Unive

[facility]

 

             Heart rate   2019 20:12:00 57 /min                   Universi

ty [facility]

 

             Body temperature 2019 20:12:00 36.11 Faith                 Univ

[facility]

 

             Respiratory rate 2019 20:12:00 16 /min                   [facility]

 

             Body height  2019 20:12:00 165.1 cm                  [facility]

[facility]

 

             Body weight  2019 20:12:00 85.333 kg                 Universi

ty [facility]

 

             BMI          2019 20:12:00 31.31 kg/m2               [facility]

ty [facility]

 

             Systolic blood 2019 20:37:00 116 mm[Hg]                Univer

sity [facility]

 

             Diastolic blood 2019 20:37:00 80 mm[Hg]                 [facility]

[facility]

 

             Body temperature 2019 20:32:00 36.44 Faith                 [facility]iv

[facility]

 

             Respiratory rate 2019 20:32:00 16 /min                   [facility]

[facility]

 

             Body height  2019 20:32:00 165.1 cm                  Universi

ty [facility]

 

             Body weight  2019 20:32:00 85.276 kg                 [facility]

 

             BMI          2019 20:32:00 31.28 kg/m2               [facility]







Procedures

This patient has no known procedures.



Encounters







        Start   End     Encounter Admission Attending Care    Care    Encounter 

Source



        Date/Time Date/Time Type    Type    Clinicians Facility Department ID   

   

 

        2021-03-15 2021-03-15 Outpatient R               [facility]    [id number]

-[id number] Univers



        16:00:00 16:00:00                                         [id number]  ity [facility]

 

        2021-03-15 2021-03-15 Outpatient R               [facility]    [id number]

[id number] Univers



        16[facility]00[facility]00 [facility]00[facility]00                                                 [facility]

 

        [facility]         [facility]    [id number]

[id number] Univers



        00:00:00 00:00:00                 Giselle DOAN OB/GYN  [id number]         it

y of



                                                REGIONAL [id number]         [place]

as



                                                MATERNAL [phone]         Med

ical



                                                & CHILD [address] [place] [address]                 

 

        2021 Outpatient R       [facility]    [id number]

0Q-20 Univers



        08:45:00 08:45:00                 KULDEEP                 [id number]  pennyy o

[facility]

 

        2021 Outpatient R       [facility]    [id number]

[id number] Univers



        08:45:00 08:45:00                 KULDEEP francisy o

[facility]

 

        2020 Telephone         Manuelito [facility]    [id number]

[id number] Univers



        00:00:00 00:00:00                 Giselle DOAN OB/GYN  [id number]         it

y of



                                                REGIONAL [id number]         [place]

as



                                                MATERNAL [id number]         Med

ical



                                                & CHILD [address] [place] [address]                 

 

        2019 Nurse           Visit, AlejoRmchp Nurse [facility]    1.2

[id number] 

Univers



        14:57:22 15:24:24 Visit           Honey Chavira [facility]  [id number]

         ity of



                                                REGIONAL [id number]         [place]

as



                                                MATERNAL [id number]         Med

ical



                                                & CHILD [address] [place] [address]                 

 

        2019 Nurse           Visit, Ang-Rmchp Nurse [facility]    1.2

[id number] 

Univers



        16:05:21 16:13:38 Visit           Kuldeep Lange OB/[facility]  [id number].

10         ity of



                                                REGIONAL [id number]         [place]

as



                                                MATERNAL [phone]         Med

ical



                                                & CHILD [address] [place] [address]                 

 

        2019 Office          Fan [facility]    [id number]

[id number] [facility]



        14:48:45 16:29:10 Visit           Honey RUGGIERO OB/GYN  [id number]        

 ity of



                                                REGIONAL [id number]         [place]

as



                                                MATERNAL [phone]         Med

ical



                                                & CHILD [address] [place] [address]                 







Results

This patient has no known results.

## 2022-02-16 NOTE — RAD REPORT
EXAM DESCRIPTION:  MRI - C Spine Wo Cont - 2/16/2022 2:08 pm

 

CLINICAL HISTORY:  arm numbness

 

COMPARISON:  C Spine Wo Con dated 2/16/2022

 

TECHNIQUE:  Sagittal T1-weighted, T2-weighted and T2-STIR sequences were obtained as well as axial T2
 medic sequence obtained.

 

FINDINGS:  Endplate edema is present at the C5-6 and C6-7 levels. No fractures identified. Loss of th
e normal cervical lordosis. Trace anterolisthesis of C4 on C5.

 

Cerebellar tonsils and mid-line skull base show no suspicious finding. No significant finding at the 
C1 and C2 levels.

 

C2-3 level: No significant findings.

C3-4 level: Uncovertebral joint hypertrophy results in mild right neural foraminal narrowing. The lef
t neural foramen is adequate. No central spinal stenosis.

C4-5 level: No significant findings.

C5-6 level: Posterior disc osteophyte complex with uncovertebral joint hypertrophy that is left eccen
tric is present. This results in moderate left and mild right neural foraminal narrowing. No signific
ant central spinal stenosis.

C6-C7: Posterior disc osteophyte complex with uncovertebral joint hypertrophy results in severe bilat
eral neural foraminal narrowing. Central spinal stenosis is mild to moderate. No cord signal abnormal
ity.

C7-T1 level: No significant findings.

 

Cervical cord shows no focal narrowing, expansion or signal abnormality.

 

IMPRESSION:  Multilevel cervical spondylosis. The findings are most advanced at C5-6 and C6-7. With r
egard to a left-sided radiculopathy, C5-6 has moderate left neural foraminal narrowing and C6-7 has s
evere left neural foraminal narrowing. Either of these levels could be the culprit location. Mild to 
moderate central spinal stenosis is noted at C6-7.

## 2022-02-16 NOTE — EDPHYS
Physician Documentation                                                                           

 [facility]                                                                 

Name: Ashley Ayala                                                                             

Age: 46 yrs                                                                                       

Sex: Female                                                                                       

: 1975                                                                                   

MRN: [id number]                                                                                   

Arrival Date: 2022                                                                          

Time: 09:58                                                                                       

Account#: [id number]                                                                            

Bed 5                                                                                             

Private MD:                                                                                       

ED Physician Norberto Yeung                                                                       

HPI:                                                                                              

                                                                                             

16:01 This 46 yrs old Female presents to ER via Ambulatory with complaints of Chest Pain,     kdr 

      Numbness Of Arm, Arm Pain.                                                                  

16:01 The patient or guardian reports chest pain that is located primarily in the anterior    kdr 

      chest wall, left. Onset: gradually, 1 week(s) ago. The pain radiates to Associated          

      signs and symptoms: The patient has no apparent associated signs or symptoms. The chest     

      pain is described as aching, burning. Duration: The patient or guardian reports             

      multiple episodes, that wax and wane, with no pattern. Modifying factors: The symptoms      

      are alleviated by remaining still, rest, the symptoms are aggravated by exertion,           

      palpation of area, twisting torso. Severity of pain: At its worst the pain was mild         

      moderate in the emergency department the pain is unchanged. Patient has had chest pain      

      and left arm pain for 1 week. It radiates up to her shoulder. She also has right chest      

      pain. She denies any current or recent injury. She has no prior cardiac history or          

      associated shortness of breath.                                                             

                                                                                                  

Historical:                                                                                       

- Allergies:                                                                                      

                                                                                             

10:23 Codeine;                                                                                jg9 

10:23 Hydrocodone-Acetaminophen;                                                              jg9 

10:23 PENICILLINS;                                                                            jg9 

- PSHx:                                                                                           

10:23 Cholecystectomy; tubal pregnancy;                                                       jg9 

                                                                                                  

- Immunization history:: Client reports having NOT received the Covid vaccine.                    

  Pneumococcal vaccine is not up to date, Flu vaccine is up to date.                              

- Social history:: Smoking status: Patient reports the use of cigarette tobacco                   

  products, smokes one-half pack cigarettes per day.                                              

                                                                                                  

                                                                                                  

ROS:                                                                                              

                                                                                             

16:01 Constitutional: Negative for fever, chills, and weight loss, Eyes: Negative for injury, kdr 

      pain, redness, and discharge, ENT: Negative for injury, pain, and discharge, Neck:          

      Negative for injury, pain, and swelling, Respiratory: Negative for shortness of breath,     

      cough, wheezing, and pleuritic chest pain, Abdomen/GI: Negative for abdominal pain,         

      nausea, vomiting, diarrhea, and constipation, Back: Negative for injury and pain, :       

      Negative for injury, bleeding, discharge, and swelling, MS/Extremity: Negative for          

      injury and deformity, Skin: Negative for injury, rash, and discoloration, Neuro:            

      Negative for headache, weakness, numbness, tingling, and seizure activity. Psych:           

      Negative for depression, anxiety, suicide ideation, homicidal ideation, and                 

      hallucinations, Allergy/Immunology: Negative for hives, rash, and allergies, Endocrine:     

      Negative for neck swelling, polydipsia, polyuria, polyphagia, and marked weight             

      changes, Hematologic/Lymphatic: Negative for swollen nodes, abnormal bleeding, and          

      unusual bruising.                                                                           

      Cardiovascular: Positive for chest pain, Negative for edema, orthopnea, palpitations,       

      paroxysmal nocturnal dyspnea.                                                               

      Respiratory: Positive for cough, dyspnea on exertion, hemoptysis, shortness of breath.      

                                                                                                  

Exam:                                                                                             

                                                                                             

10:19 ECG was reviewed by the Attending Physician.                                            kdr 

                                                                                             

16:01 Constitutional:  This is a well developed, well nourished patient who is awake, alert,  kdr 

      and in no acute distress. Head/Face:  Normocephalic, atraumatic. Eyes:  Pupils equal        

      round and reactive to light, extra-ocular motions intact.  Lids and lashes normal.          

      Conjunctiva and sclera are non-icteric and not injected.  Cornea within normal limits.      

      Periorbital areas with no swelling, redness, or edema. Neck:  Trachea midline, no           

      thyromegaly or masses palpated, and no cervical lymphadenopathy.  Supple, full range of     

      motion without nuchal rigidity, or vertebral point tenderness.  No Meningismus.             

      Chest/axilla:  Normal chest wall appearance and motion.  Nontender with no deformity.       

      No lesions are appreciated. Cardiovascular:  Regular rate and rhythm with a normal S1       

      and S2.  No gallops, murmurs, or rubs.  Normal PMI, no JVD.  No pulse deficits.             

      Respiratory:  Lungs have equal breath sounds bilaterally, clear to auscultation and         

      percussion.  No rales, rhonchi or wheezes noted.  No increased work of breathing, no        

      retractions or nasal flaring. Abdomen/GI:  Soft, non-tender, with normal bowel sounds.      

      No distension or tympany.  No guarding or rebound.  No evidence of tenderness               

      throughout. Back:  No spinal tenderness.  No costovertebral tenderness.  Full range of      

      motion. Skin:  Warm, dry with normal turgor.  Normal color with no rashes, no lesions,      

      and no evidence of cellulitis. MS/ Extremity:  Pulses equal, no cyanosis.                   

      Neurovascular intact.  Full, normal range of motion. Neuro:  Awake and alert, GCS 15,       

      oriented to person, place, time, and situation.  Cranial nerves II-XII grossly intact.      

      Motor strength 5/5 in all extremities.  Sensory grossly intact.  Cerebellar exam            

      normal.  Normal gait. Psych:  Awake, alert, with orientation to person, place and time.     

       Behavior, mood, and affect are within normal limits.                                       

                                                                                                  

Vital Signs:                                                                                      

                                                                                             

10:00  / 72 LA; Pulse 51 LA; Resp 17; Temp 97.5; Pulse Ox 99% on R/A; Weight 88 kg (R); jg9 

      Height 5 ft. 5 in. (165.10 cm) (R);                                                         

11:45  / 60; Pulse 51; Resp 17; Pulse Ox 100% ;                                         ww  

12:30  / 70; Pulse 55; Resp 14; Pulse Ox 100% ;                                         ww  

13:23  / 69; Pulse 49; Resp 16; Pulse Ox 97% ;                                          ww  

14:32  / 76; Pulse 48; Resp 16 S; Pulse Ox 98% on R/A;                                  jd3 

15:39  / 65; Pulse 49; Resp 16; Pulse Ox 99% on R/A;                                    ww  

16:54  / 73; Pulse 55; Resp 18; Pulse Ox 99% on R/A;                                    ld1 

19:27  / 60; Pulse 55; Resp 17 S; Pulse Ox 99% on R/A;                                  jd3 

10:00 Body Mass Index 32.28 (88.00 kg, 165.10 cm)                                             9 

                                                                                                  

MDM:                                                                                              

17:10 Patient medically screened.                                                             [facility] 

                                                                                             

16:01 Data reviewed: vital signs, nurses notes, lab test result(s), EKG, radiologic studies.  [facility] 

                                                                                                  

                                                                                             

10:11 Order name: Basic Metabolic Panel                                                       [facility] 

                                                                                             

10:11 Order name: CBC with Diff; Complete Time: 12:10                                         [facility] 

                                                                                             

10:11 Order name: LFT's                                                                       [facility] 

                                                                                             

10:11 Order name: Magnesium; Complete Time: 12:10                                             [facility] 

                                                                                             

10:11 Order name: NT PRO-BNP; Complete Time: 12:10                                            [facility] 

                                                                                             

10:11 Order name: PT-INR; Complete Time: 12:10                                                kdr 

                                                                                             

10:11 Order name: Troponin HS; Complete Time: 12:10                                           kdr 

02                                                                                             

10:11 Order name: XRAY Chest (1 view); Complete Time: 12:10                                   kdr 

02                                                                                             

10:11 Order name: Basic Metabolic Panel; Complete Time: 12:10                                 EDMS

02                                                                                             

10:11 Order name: Liver (Hepatic) Function; Complete Time: 12:10                              EDMS

02                                                                                             

10:17 Order name: CT C Spine; Complete Time: 12:10                                            kdr 

02                                                                                             

12:20 Order name: C Spine Wo Cont; Complete Time: 14:34                                       EDMS

02                                                                                             

15:03 Order name: COVID-19 SARS RT PCR (Document "Date of Onset" if Symptomatic); Complete    bd  

      Time: 17:                                                                                             

10:11 Order name: EKG; Complete Time: 10:12                                                   kdr 

                                                                                             

10:11 Order name: Cardiac monitoring; Complete Time: 10:                                    kdr 

                                                                                             

10:11 Order name: EKG - Nurse/Tech; Complete Time: 10:                                      kdr 

                                                                                             

10:11 Order name: IV Saline Lock; Complete Time: 10:                                        kdr 

                                                                                             

10:11 Order name: Labs collected and sent; Complete Time: 10:                               kdr 

                                                                                             

10:11 Order name: O2 Per Protocol; Complete Time: 10:                                       kdr 

                                                                                             

10:11 Order name: O2 Sat Monitoring; Complete Time: 10:23                                     kdr 

                                                                                                  

EC/16                                                                                             

10:19 Rate is 56 beats/min. Rhythm is regular, Sinus bradycardia with No ectopy. QRS Axis is  kdr 

      Normal. AR interval is normal. QRS interval is normal. QT interval is normal. Clinical      

      impression: NSR w/ Non-specific ST/T Changes and Sinus bradycardia.                         

                                                                                                  

Administered Medications:                                                                         

10:39 Drug: Ketorolac 15 mg Route: IVP; Site: right antecubital;                              ww  

11:30 Follow up: Response: No adverse reaction                                                jd3 

10:39 Drug: SOLU-Medrol (methylPrednisoLONE) 125 mg Route: IVP; Site: right antecubital;      ww  

11:30 Follow up: Response: No adverse reaction                                                jd3 

10:40 Drug: Robaxin (methocarbamol) 1 grams Route: IVPB; Infused Over: 1 hrs; Site: right     ww  

      antecubital;                                                                                

11:59 Follow up: IV Status: Completed infusion                                                ke1 

12:44 Not Given (Allergic , md notifiedd): Norco (HYDROcodone-acetaminophen) 10 mg-325 mg 1   ke1 

      tabs PO once; RASS on ADMIN: Combtv4, Very Agttd3, Agttd2, Rstlss1, AlertClm0, Drwsy-1,     

      Lt Sdtn-2, Mod Sdtn-3, Dp Sdtn-4, UnArsble-5                                                

12:48 Drug: morphine 4 mg Route: IVP; Site: right antecubital;                                ke1 

13:40 Follow up: Response: No adverse reaction; RASS: Alert and Calm (0)                      jd3 

19:25 Drug: morphine 4 mg Route: IVP; Site: right antecubital;                                jd3 

19:26 Follow up: Response: No adverse reaction; RASS: Alert and Calm (0)                      jd3 

                                                                                                  

                                                                                                  

Disposition Summary:                                                                              

22 17:10                                                                                    

Transfer Ordered                                                                                  

      Transfer Location: [facility]                                                          kdr 

      Reason: Higher level of care                                                            kdr 

      Condition: Fair                                                                         kdr 

      Problem: new                                                                            kdr 

      Symptoms: have improved                                                                 kdr 

      Accepting Physician: [facility](22 19:28)                                         jd3 

      Diagnosis                                                                                   

        - Cervical disc disorder with radiculopathy                                           kdr 

        - Weakness - Right upper extremity(22 17:11)                                    kdr 

      Forms:                                                                                      

        - Medication Reconciliation Form                                                      kdr 

        - SBAR form                                                                           kdr 

Signatures:                                                                                       

Dispatcher [employer]                           Norberto Flores MD MD   kdr                                                  

Zachary Gallegos RN                    RN   jHeaven Hernandez RN                   RN   jg9                                                  

Josee Reynolds RN RN ww Ebrottie, Kouassi, RN                   RN   ke1                                                  

                                                                                                  

Corrections: (The following items were deleted from the chart)                                    

17:10 17:10 [facility] kdr                                                                    kdr 

17:11 17:10 [facility] kdr                                                                    kdr 

17:11 17:10 Weakness kdr                                                                      kdr 

19:28 17:11 [facility] kdr                                                                    jd3 

                                                                                                  

**************************************************************************************************

## 2022-05-10 ENCOUNTER — HOSPITAL ENCOUNTER (EMERGENCY)
Dept: HOSPITAL 97 - ER | Age: 47
Discharge: HOME | End: 2022-05-10
Payer: SELF-PAY

## 2022-05-10 VITALS — DIASTOLIC BLOOD PRESSURE: 78 MMHG | SYSTOLIC BLOOD PRESSURE: 139 MMHG | OXYGEN SATURATION: 99 % | TEMPERATURE: 97.8 F

## 2022-05-10 DIAGNOSIS — Z88.5: ICD-10-CM

## 2022-05-10 DIAGNOSIS — M25.562: Primary | ICD-10-CM

## 2022-05-10 DIAGNOSIS — Z88.0: ICD-10-CM

## 2022-05-10 DIAGNOSIS — F17.210: ICD-10-CM

## 2022-05-10 DIAGNOSIS — R10.30: ICD-10-CM

## 2022-05-10 PROCEDURE — 99283 EMERGENCY DEPT VISIT LOW MDM: CPT

## 2022-05-10 PROCEDURE — 93971 EXTREMITY STUDY: CPT

## 2022-05-10 PROCEDURE — 76856 US EXAM PELVIC COMPLETE: CPT

## 2022-05-10 NOTE — XMS REPORT
Continuity of Care Document

                             Created on:May 10, 2022



Patient:HOMER KING

Sex:Female

:1975

External Reference #:[id number]





Demographics







                          Address                    [address] 



                                                    [place] [address]

 

                          Home Phone                [phone]

 

                          Mobile Phone              [phone]

 

                          Email Address             NONE

 

                          Preferred Language        English

 

                          Marital Status            Unknown

 

                          [employer] Affiliation     Unknown

 

                          Race                      Unknown

 

                          Additional Race(s)        Unavailable

 

                          Ethnic Group              Unknown









Author







                          Organization              [facility]

t

 

                          Address                   [address]



                                                    [place] [address]

 

                          Phone                     [phone]









Support







                Name            Relationship    Address         Phone

 

                CHRISTINE GUZMAN               [address]  [address]

[phone]



                                                [place] [address] 

 

                CHRISTINE         X               Unavailable     [phone]









Care Team Providers







                    Name                Role                Phone

 

                    VENTURA QUINTANILLA     Primary Care Physician Unavailable

 

                    RANDY               Attending Clinician Unavailable

 

                    RANDY               Attending Clinician Unavailable

 

                    YOANDY               Attending Clinician Unavailable

 

                    Yoandy ASHTON MD         Attending Clinician [phone]

 

                    Doctor Unassigned,  Name Attending Clinician Unavailable

 

                    Manuelito FNP,  N    Attending Clinician [phone]

 

                    NAZARIO  C        Attending Clinician Unavailable

 

                    Visit,  Nurse       Attending Clinician Unavailable

 

                    Fna MURILLOP,  R      Attending Clinician [phone]

 

                    Nazario CABRAL,  C  Attending Clinician [phone]

 

                    RANDY               Admitting Clinician Unavailable









Payers







           Payer Name Policy Type Policy Number Effective Date Expiration Date S

ource







Advance Directives







           Directive  Decision   Effective  Termination Comments   Source



                                 Date       Date                  

 

           Healthcare Agents on N/A                                         NPI:

1831



           [facility]                                       

      [id number]



           Agent                                                  



           RelationshipCommunicationPatty                                       

      



           [facility]                                             



           [phone]                                             



           (Mobile)[phone] (Home)                                          

   







Problems







       Condition Condition Condition Status Onset  Resolution Last   Treating Co

mments 

Source



       Name   Details Category        Date   Date   Treatment Clinician        



                                                 Date                 

 

       Cervical Cervical Disease Active                              NPI:1

83



       stenosis stenosis               2-16                               [id number]

1



       of spine of spine               00:00:                             



                                   00                                 

 

       Abnormalit Abnormalit Disease Active                              N

PI:183



       y of right y of right               604                               13

41629



       breast on breast on               00:00:                             



       screening screening               00                                 



       mammogram mammogram                                                  

 

       Right  Right  Disease Active                       Overview: NPI:18

3



       ovarian ovarian               6-04                        Form[place] 04758

81



       cyst   cyst                 00:00:                      g of this 



                                   00                          note   



                                                               might be 



                                                               different 



                                                               from the 



                                                               original. 



                                                               1.8 x 1.8 



                                                               x 2.1 cm 



                                                               right  



                                                               ovarian 



                                                               hemorrhag 



                                                               ic cyst 



                                                               on 19 



                                                               pelvic US 

 

       Intramural Intramural Disease Active                       Overview

: NPI:183



       leiomyoma leiomyoma               6-04                        [place]att[place] 1

753821



       of uterus of uterus               00:00:                      g of this 



                                   00                          note   



                                                               might be 



                                                               different 



                                                               from the 



                                                               original. 



                                                               1 x 0.8 



                                                               cm on  



                                                               19 



                                                               pelvic US 

 

       Screen for Screen for Disease Active                              N

PI:183



       STD    STD                                                 [id number]



       (sexually (sexually               00:00:                             



       transmitte transmitte               00                                 



       d disease) d disease)                                                  

 

       BMI    BMI    Disease Active                              NPI:183



       31.0-31.9, 31.0-31.9,                                              13

10816



       adult  adult                00:00:                             



                                   00                                 

 

       Positive Positive Disease Active                              NPI:1

83



       depression depression                                              13

76050



       screening screening               00:00:                             



                                   00                                 

 

       S/P tubal S/P tubal Disease Active                              NPI

:183



       ligation ligation                                              [id number]

1



                                   00:00:                             



                                   00                                 

 

       Trichomona Trichomona Disease Active                              N

PI:183



       l      l                                                   [id number]



       vulvovagin vulvovagin               00:00:                             



       itis   itis                 00                                 

 

       Menorrhagi Menorrhagi Disease Active                              N

PI:183



       a with a with                                              5553981



       regular regular               00:00:                             



       cycle  cycle                00                                 

 

       Enlarged Enlarged Disease Active                              NPI:1

83



       uterus uterus                                              [id number]



                                   00:00:                             



                                   00                                 

 

       S/P tubal S/P tubal Disease Active                              NPI

:183



       ligation ligation                                              702533

1



                                   00:00:                             



                                   00                                 

 

       Menorrhagi Menorrhagi Disease Active                              N

PI:183



       a with a with                                              9052997



       regular regular               00:00:                             



       cycle  cycle                00                                 







Allergies, Adverse Reactions, Alerts







       Allergy Allergy Status Severity Reaction(s) Onset  Inactive Treating Comm

ents 

Source



       Name   Type                        Date   Date   Clinician        

 

       Penicill Drug   Active        Hives                        NPI:183



       in     Allergy                                              [id number]



                                          00:00:                      



                                          00                          

 

       P[facility]ICILL DRUG   Active Med    Hives                        NPI:183



       IN     INGREDI                                              [id number]



                                          00:00:                      



                                          00                          

 

       P[facility]ill Drug   Active        Hives                        NPI:183



       in     Allergy                                              [id number]



                                          00:00:                      



                                          00                          

 

       Amoxicil Propensi Active        Hives                        NPI:18

3



       frandy    ty to                                               [id number]



              adverse                      00:00:                      



              reaction                      00                          



              s                                                       

 

       AMOXICIL DRUG   Active        Hives                        NPI:183



       FRANDY    INGREDI                                              [id number]



                                          00:00:                      



                                          00                          

 

       Hydrocod Propensi Active        Nausea 2016                      NPI:18

3



       one-Acet ty to                and/or 2-                        4601413



       aminophe adverse               Vomiting 00:00:                      



       n      reaction                      00                          



              s                                                       

 

       HYDROCOD DRUG   Active        N/V    2016                      NPI:183



       ONE-ACET                             2-                        [id number]



       AMINOPHE                             00:00:                      



       N                                  00                          

 

       Codeine Propensi Active        Rash                         NPI:183



              ty to                                               9009639



              adverse                      00:00:                      



              reaction                      00                          



              s                                                       

 

       CODEINE DRUG   Active        Rash                         NPI:183



              INGREDI                                              [id number]



                                          00:00:                      



                                          00                          







Social History







           Social Habit Start Date Stop Date  Quantity   Comments   Source

 

           History of tobacco 1999            Cigarette Smoker            

NPI:[id number]



           use        00:00:00                                    

 

           History SDOH                                             NPI:[id number]

81



           Alcohol Frequency                                             

 

           History SDOH                                             NPI:[id number]

81



           Alcohol Std Drinks                                             

 

           History SDOH                                             NPI:[id number]

81



           Alcohol Binge                                             

 

           Exposure to                       Not sure              NPI:[id number]

1



           SARS-CoV-2 (event)                                             

 

           Alcohol intake 2022 Current drinker of            NP

I:[id number]



                      00:00:00   00:00:00   alcohol (finding)            

 

           Cigarettes smoked 2019                       NPI:183

[id number]



           current (pack per 00:00:00   00:00:00                         



           day) - Reported                                             

 

           Alcohol Comment 2019 ocassional            NPI:96943

92127



                      00:00:00   00:00:00                         

 

           Tobacco use and 2019 Never used            NPI:98855

30188



           exposure   00:00:00   00:00:00                         

 

           Sex Assigned At 1975                       NPI:67495

39954



           Birth      00:00:00   00:00:00                         









                Smoking Status  Start Date      Stop Date       Source

 

                Current every day smoker 2019 00:00:00                 NPI

:[id number]







Medications







       Ordered Filled Start  Stop   Current Ordering Indication Dosage Frequency

 Signature

                    Comments            Components          Source



     Medication Medication Date Date Medication? Clinician                (SIG) 

          



     Name Name                                                   

 

     FENTanyl PF      - No             25ug      25 mcg,           NP

I:183



     (SUBLIMAZE                                Slow IV           87395

81



     (PF))      17:30: 16:24                          Push,           



     injection      00   :00                           ONCE, 1           



     25 mcg                                         dose, On           



                                                  22 at           



                                                  1130,           



                                                  Routine           

 

     famotidine            Yes            20mg      20 mg,           NPI:1

83



     (PEPCID AC)      2-17                               Oral, BID,           13

27507



     tablet 20      14:00:                               First dose           



     mg        00                                 on 22 at           



                                                  0800,           



                                                  Until           



                                                  Discontinu           



                                                  ed,            



                                                  Routine           

 

     docusate            Yes            100mg      100 mg,           NPI:1

83



     (COLACE)      2-17                               Oral, BID,           45673

81



     capsule 100      14:00:                               First dose           



     mg        00                                 on 22 at           



                                                  0800,           



                                                  Until           



                                                  Discontinu           



                                                  ed,            



                                                  Routine           

 

     gabapentin            Yes            300mg      300 mg,           NPI

:183



     (NEURONTIN)      2-17                               Oral, TID,           13

71283



     capsule 300      14:00:                               First dose           



     mg        00                                 on 22 at           



                                                  0800,           



                                                  Until           



                                                  Discontinu           



                                                  ed,            



                                                  Routine           

 

     traMADoL            Yes            50mg      50 mg,           NPI:183



     (ULTRAM)      -17                               Oral,           [id number]



     tablet 50      03:35:                               Q6HPRN,           



     mg        17                                 Starting           



                                                  on 22 at           



                                                  2135,           



                                                  Until           



                                                  Discontinu           



                                                  ed,            



                                                  Routine,           



                                                  Pain           



                                                  (scale           



                                                  4-6)           

 

     acetaminoph            Yes            325mg      325 mg,           NP

I:183



     en        -17                               Oral,           [id number]



     (TYLENOL)      03:35:                               Q6HPRN,           



     tablet 325      11                                 Starting           



     mg                                           on 22 at           



                                                  2135,           



                                                  Until           



                                                  Discontinu           



                                                  ed,            



                                                  Routine,           



                                                  Pain           



                                                  (scale           



                                                  1-3)           

 

     bisacodyL            Yes            10mg      10 mg,           NPI:18

3



     (DULCOLAX)      2-17                               Rectal,           995218

1



     suppository      03:32:                               QHSPRN,           



     10 mg      01                                 Starting           



                                                  on 22 at           



                                                  2132,           



                                                  Until           



                                                  Discontinu           



                                                  ed,            



                                                  Routine,           



                                                  Constipati           



                                                  on             

 

     NaCl 0.9%            Yes            5mL       5 mL, Slow           NP

I:183



     (NS)      2-17                               IV Push,           [id number]



     injection 5      03:32:                               PRN - SEE           



     mL        01                                 INSTRUCTIO           



                                                  NS,            



                                                  Starting           



                                                  on 22 at           



                                                  2132,           



                                                  Until           



                                                  Discontinu           



                                                  ed, 10 mL           

 

     methylPREDN            Yes       [id number]           Take  by         

  NPI:183



     ISolone 4      2-17                               mouth           9041010



     mg tablets      00:00:                               SEE-INSTRU           



               00                                 CTIONS.           



                                                  follow           



                                                  package           



                                                  directions           

 

     methylPREDN            Yes       [id number]           Take  by         

  NPI:183



     ISolone 4      2-17                               mouth           0721202



     mg tablets      00:00:                               SEE-INSTRU           



               00                                 CTIONS.           



                                                  follow           



                                                  package           



                                                  directions           

 

     methylPREDN      0      Yes       [id number]           Take  by         

  NPI:183



     ISolone 4      2-17                               mouth           1214334



     mg tablets      00:00:                               SEE-INSTRU           



               00                                 CTIONS.           



                                                  follow           



                                                  package           



                                                  directions           

 

     methylPREDN            Yes       [id number]           Take  by         

  NPI:183



     ISolone 4      2-17                               mouth           [id number]



     mg tablets      00:00:                               SEE-INSTRU           



               00                                 CTIONS.           



                                                  follow           



                                                  package           



                                                  directions           

 

     gabapentin      2022- Yes       [id number] 300mg      Take 1          

 NPI:183



     300 mg      2-17 03-20                          capsule by           012664

1



     capsule      00:00: 04:59                          mouth 3           



               00   :00                           (three)           



                                                  times           



                                                  daily for           



                                                  30 days.           

 

     gabapentin      2022- Yes       [id number] 300mg      Take 1          

 NPI:183



     300 mg      2-17 03-20                          capsule by           296035

1



     capsule      00:00: 04:59                          mouth 3           



               00   :00                           (three)           



                                                  times           



                                                  daily for           



                                                  30 days.           

 

     gabapentin      2022- Yes       [id number] 300mg      Take 1          

 NPI:183



     300 mg      2-17 03-20                          capsule by           [id number]

1



     capsule      00:00: 04:59                          mouth 3           



               00   :00                           (three)           



                                                  times           



                                                  daily for           



                                                  30 days.           

 

     LOESTRIN  2019- No        [id number] 1{tbl}      Take 1       

    NPI:183



     (MICROGESTI      -                          tablet by           13

[id number]



     N  )      00:00: 00:00                          mouth           



     1 mg-20 mcg      00   :00                           daily.           



     (21)/75 mg                                                        



     (7) tablet                                                        

 

     LOESTRIN  2019- No        [id number] 1{tbl}      Take 1       

    NPI:183



     (MICROGESTI      5-29 07-29                          tablet by           

[id number]



     N FE )      00:00: 00:00                          mouth           



     1 mg-20 mcg      00   :00                           daily.           



     (21)/75 mg                                                        



     (7) tablet                                                        

 

     LOESTRIN       2019- No        [id number] 1{tbl}      Take 1       

    NPI:183



     (MICROGESTI      5-29 -29                          tablet by           

[id number]



     N FE )      00:00: 00:00                          mouth           



     1 mg-20 mcg      00   :00                           daily.           



     (21)/75 mg                                                        



     (7) tablet                                                        

 

     LOESTRIN       2019- No        [id number] 1{tbl}      Take 1       

    NPI:183



     (MICROGESTI      5-29 -29                          tablet by           

[id number]



     N FE )      00:00: 00:00                          mouth           



     1 mg-20 mcg      00   :00                           daily.           



     (21)/75 mg                                                        



     (7) tablet                                                        

 

     No known                No                                      NPI:183



     medications                                                        [id number]

 

     No known                No                                      NPI:183



     medications                                                        [id number]

 

     No known                No                                      NPI:183



     medications                                                        [id number]

 

     No known                No                                      NPI:183



     medications                                                        [id number]







Vital Signs







             Vital Name   Observation Time Observation Value Comments     Source

 

             Systolic blood pressure 2022 18:00:00 107 mm[Hg]             

   NPI:[id number]

 

             Diastolic blood 2022 18:00:00 58 mm[Hg]                 NPI:1

[id number]



             pressure                                            

 

             Heart rate   2022 18:00:00 61 /min                   NPI:1831

[id number]

 

             Body temperature 2022 18:00:00 36.44 Faith                 NPI:

[id number]

 

             Respiratory rate 2022 18:00:00 20 /min                   NPI:

[id number]

 

             Oxygen saturation in 2022 18:00:00 100 /min                  

NPI:[id number]



             Arterial blood by Pulse                                        



             oximetry                                            

 

             Body height  2022 03:37:00 165.1 cm                  NPI:1831

[id number]

 

             Body weight  2022 01:00:00 86.183 kg                 NPI:1831

[id number]

 

             BMI          2022 01:00:00 31.62 kg/m2               NPI:1831

[id number]

 

             Systolic blood pressure 2019 20:12:00 129 mm[Hg]             

   NPI:[id number]

 

             Diastolic blood 2019 20:12:00 74 mm[Hg]                 NPI:1

[id number]



             pressure                                            

 

             Heart rate   2019 20:12:00 57 /min                   NPI:1831

[id number]

 

             Body temperature 2019 20:12:00 36.11 Faith                 NPI:

[id number]

 

             Respiratory rate 2019 20:12:00 16 /min                   NPI:

[id number]

 

             Body height  2019 20:12:00 165.1 cm                  NPI:1831

[id number]

 

             Body weight  2019 20:12:00 85.333 kg                 NPI:1831

[id number]

 

             BMI          2019 20:12:00 31.31 kg/m2               NPI:1831

[id number]

 

             Systolic blood pressure 2019 20:37:00 116 mm[Hg]             

   NPI:[id number]

 

             Diastolic blood 2019 20:37:00 80 mm[Hg]                 NPI:1

[id number]



             pressure                                            

 

             Body temperature 2019 20:32:00 36.44 Faith                 NPI:

[id number]

 

             Respiratory rate 2019 20:32:00 16 /min                   NPI:

[id number]

 

             Body height  2019 20:32:00 165.1 cm                  NPI:1831

[id number]

 

             Body weight  2019 20:32:00 85.276 kg                 NPI:1831

[id number]

 

             BMI          2019 20:32:00 31.28 kg/m2               NPI:1831

[id number]







Procedures







                Procedure       Date / Time Performed Performing Clinician [facility]

e

 

                AUTHORIZATION FOR RELEASE 2022 05:01:00 Doctor Unassigned,

 No 

NPI:[id number]



                OF PHI                          Name            

 

                EXTERNAL PROVIDER RECORDS 2022 06:01:00 Doctor Unassigned,

 No 

NPI:[id number]



                                                Name            

 

                ABORH CONFIRMATION (LAB 2022 11:40:00 Bhanu Melvin 

NPI:[id number]



                ONLY)                                           

 

                HB ABO GROUPING 2022 11:06:00 Bhanu Melvin NPI:1831

[id number]

 

                BASIC METABOLIC PANEL (NA, 2022 11:05:00 Alysa eMlvin 

NPI:[id number]



                K, CL, CO2, GLUCOSE, BUN,                                 



                CREATININE, CA)                                 

 

                CBC WITH DIFF   2022 11:05:00 Bhanu Melvin NPI:1831

[id number]

 

                PROTHROMBIN TIME / INR 2022 11:05:00 Bhanu Melvin FRANKI

PI:[id number]

 

                ACTIVATED PARTIAL THRMPLAS 2022 11:05:00 Alysa Melvin

er 

NPI:[id number]



                ABDI                                             







Encounters







        Start   End     Encounter Admission Attending Care    Care    Encounter 

Source



        Date/Time Date/Time Type    Type    Clinicians Facility Department ID   

   

 

        2022 Outpatient R       KEN PADILLA [facility]    

[id number] NPI:183



        08:30:00 08:30:00                 KEN PADILLA                 [id number] 

 [id number]

 

        2022 Outpatient R       KEN PADILLA [facility]    

[id number] NPI:183



        15:00:00 15:00:00                 KEN PADILLA                 [id number] 

 [id number]

 

        2022 Outpatient KEN ESCALANTE [facility]    

[id number] NPI:183



        15:00:00 15:00:00                 KEN PADILLA                        

 [id number]

 

        2022 Outpatient R       RANDYJESUS ORTEGARICK [facility]    

[id number] NPI:183



        14:30:00 14:30:00                 KEN PADILLA                 [id number] 

 [id number]

 

        2022 Outpatient KEN ESCALANTE [facility]    

[id number] NPI:183



        14:30:00 14:30:00                 KEN PADILLA                        

 [id number]

 

        2022 Outpatient R       YOANDY  [facility]    [id number]

[id number] NPI:183



        11:01:18 23:59:00                 [facility]                         [id number]

81

 

        2022 [facility]         Yoandy  [facility]    1.2.840.114 [id number]

[id number] NPI:183



        11:01:18 23:59:00 Encounter         [facility]  350.1.13.10        

 [id number]



                                                CLEAR   42.7.2.686         



                                                [place]    [phone]         



                                                MEDICAL [facility]             



                                                OFFICE                  



                                                BUILDING                 

 

        [facility]    [id number]

-20 NPI:183



        11:30:00 11:30:00                 CAMILO                 [id number]  [id number]

81

 

        2022 Orders          Doctor  KADEN    1.2.840.114 [id number]

[id number] NPI:183



        00:00:00 00:00:00 Only            UnassignedKIT   350.1.13.10       

  [id number]



                                        [place] [address]2.7.2.686         



                                                        [phone]         



                                                        009             

 

        2022 Orders          Doctor  KADEN    1.2.840.114 [id number]

[id number] NPI:183



        00:00:00 00:00:00 Only            UnassignedKIT   350.1.13.10       

  [id number]



                                        [place] [address].2.7.2.686         



                                                        [phone]         



                                                        009             

 

        2022 Outpatient U       KEN PADILLA [facility]     

[id number] NPI:183



        20:51:00 14:00:00                 KEN PADILLA                        

 [id number]

 

        2022 [facility]         RandyHARVEY  1.2.840.114 [id number]

[id number] NPI:183



        20:51:00 14:00:00 Encounter         Ken PANTOJA   350.1.13.10         

[id number]



                                                [address]2.7.2.686         



                                                        [phone]         



                                                        097             

 

        2021-03-15 2021-03-15 Outpatient R               [facility]    [id number]

-20 NPI:183



        16:00:00 16:00:00                                         [id number]  [id number]

1

 

        2021-03-15 2021-03-15 Outpatient R               [facility]    [id number]

116 NPI:183



        16:00:00 16:00:00                                                 [id number]

1

 

        [facility]    1.2.840.114 [id number]

[id number] NPI:183



        00:00:00 00:00:00                 Giselle DOAN OB/GYN  350.1.13.10         13

[id number]



                                                REGIONAL 4.2.7.2.686         



                                                MATERNAL [phone]         



                                                & CHILD [address]             



                                                [facility]                 

 

        2021 Outpatient R       NAZARIO [facility]    [id number]

[id number] NPI:183



        08:45:00 08:45:00                 BRIT                 [id number]  [id number]

81

 

        2021 Outpatient R       NAZARIO [facility]    [id number]

[id number] NPI:183



        08:45:00 08:45:00                 BRIT                         37070

81

 

        2020 [facility]    1.2.840.114 [id number]

[id number] NPI:183



        00:00:00 00:00:00                 Giselle DOAN OB/GYN  350.1.13.10         13

[id number]



                                                REGIONAL 4.2.7.2.686         



                                                MATERNAL [phone]         



                                                & CHILD [address]             



                                                [facility]                 

 

        2019 [facility]    1.2

.840.114 [id number] 

NPI:183



        14:57:22 15:24:24 Visit           Honey Chavira [facility]  350.1.13.10

         [id number]



                                                REGIONAL 4.2.7.2.686         



                                                MATERNAL [phone]         



                                                & CHILD [address]             



                                                [facility]                 

 

        2019 [facility]           [facility]    1.2

.840.114 [id number] 

NPI:183



        16:05:21 16:13:38 Visit           Brit Lange [facility]  350.1.13.

10         [id number]



                                                REGIONAL 4.2.7.2.686         



                                                MATERNAL [phone]         



                                                & CHILD [address]             



                                                [facility]                 

 

        2019 Office          Fan [facility]    1.2.840.114 [id number]

32 NPI:183



        14:48:45 16:29:10 Visit           Honey RUGGIERO OB/[facility]  350.1.13.10        

 [id number]



                                                REGIONAL 4.2.7.2.686         



                                                MATERNAL [phone]         



                                                & CHILD [address]             



                                                [facility]                 







Results







           Test Description Test Time  Test Comments Results    Result Comments 

Source









                    ABORH Confirmation (Lab Only) 2022 13:30:16 









                      Test Item  Value      Reference Range Interpretation Comme

nts









             ABO & RH (test code = 20) A Positive                             Pe

rformed at [facility] - 

[facility]



                                                                 [address]

[place], [place] [address]

 Free: [phone] No.



                                                                 [id number]



NPI:[id number] METABOLIC PANEL (NA, K, CL, CO2, GLUCOSE, BUN, CREATININE, 
CA)2022 12:14:34





             Test Item    Value        Reference Range Interpretation Comments

 

             NA (test code = 135 mmol/L   135-145                   



             8244509595)                                         

 

             K (test code = 4.3 mmol/L   3.5-5.0                   



             8513568006)                                         

 

             CL (test code = 108 mmol/L                       



             8982323851)                                         

 

             CO2 TOTAL (test code = 22 mmol/L    23-31        L            



             9041472249)                                         

 

             AGAP (test code =              2-16                      



             9161437164)                                         

 

             BUN (test code = 12 mg/dL     7-23                      



             5256520776)                                         

 

             GLUCOSE (test code = 131 mg/dL           H            



             5152741109)                                         

 

             CREATININE (test code = 0.70 mg/dL   0.50-1.04                 



             6811489517)                                         

 

             CALCIUM (test code = 8.3 mg/dL    8.6-10.6     L            



             7504987984)                                         

 

             eGFR (test code =              mL/min/1.73m2              



             1331074492)                                         

 

             KATTY (test code = KATTY) Association of                           



                          Glomerular Filtration                           



                          Rate (GFR) and Staging                           



                          of Kidney Disease*                           



                          +---------------------                           



                          --+-------------------                           



                          --+-------------------                           



                          ------+| GFR                           



                          (mL/min/1.73 m2) ?|                           



                          With Kidney Damage ?|                           



                          ?Without Kidney                           



                          Damage+---------------                           



                          --------+-------------                           



                          --------+-------------                           



                          ------------+| ?>90 ?                           



                          ? ? ? ? ? ? ? ?|                           



                          ?Stage one ? ? ? ? ?|                           



                          ? Normal ? ? ? ? ? ? ?                           



                          ?+--------------------                           



                          ---+------------------                           



                          ---+------------------                           



                          -------+| ?60-89 ? ? ?                           



                          ? ? ? ? ?| ?Stage two                           



                          ? ? ? ? ?| ? Decreased                           



                          GFR ? ? ? ?                            



                          +---------------------                           



                          --+-------------------                           



                          --+-------------------                           



                          ------+| ?30-59 ? ? ?                           



                          ? ? ? ? ?| ?Stage                           



                          three ? ? ? ?| ? Stage                           



                          three ? ? ? ? ?                           



                          +---------------------                           



                          --+-------------------                           



                          --+-------------------                           



                          ------+| ?15-29 ? ? ?                           



                          ? ? ? ? ?| ?Stage four                           



                          ? ? ? ? | ? Stage four                           



                          ? ? ? ? ?                              



                          ?+--------------------                           



                          ---+------------------                           



                          ---+------------------                           



                          -------+| ?<15 (or                           



                          dialysis) ? ?| ?Stage                           



                          five ? ? ? ? | ? Stage                           



                          five ? ? ? ? ?                           



                          ?+--------------------                           



                          ---+------------------                           



                          ---+------------------                           



                          -------+ *Each stage                           



                          assumes the associated                           



                          GFR level has been in                           



                          effect for at least                           



                          three months. ?Stages                           



                          1 to 5, with or                           



                          without kidney                           



                          disease, indicate                           



                          chronic kidney                           



                          disease. Notes:                           



                          Determination of                           



                          stages one and two                           



                          (with eGFR                             



                          >59mL/min/1.73 m2)                           



                          requires estimation of                           



                          kidney damage for at                           



                          least three months as                           



                          defined by structural                           



                          or functional                           



                          abnormalities of the                           



                          kidney, manifested by                           



                          either:Pathological                           



                          abnormalities or                           



                          Markers of kidney                           



                          damage (including                           



                          abnormalities in the                           



                          composition of the                           



                          blood or urine or                           



                          abnormalities in                           



                          imaging tests).                           

 

             Lab Interpretation Abnormal                               



             (test code = 19478-2)                                        



NPI:[id number] and Screen - ONCE [id number] 11:55:01





             Test Item    Value        Reference Range Interpretation Comments

 

             ABO & RH (test code A POSITIVE                             Performe

d at [facility]



             = 20)                                               Laboratory Serv

ices -



                                                                 [facility]

[address] [place] [address]



                                                                 [address]udo Free:



                                                                 [phone]

A No.



                                                                 [id number]

 

             IAT (test code = Negative                               Performed a

t [facility]



             1185)                                               Laboratory Serv

ices -



                                                                 [facility]

[address] [place] [address]



                                                                 [address] Free:



                                                                 [phone]

A No.



                                                                 [id number]



NPI:[id number] TIME / [id number] 11:29:46





             Test Item    Value        Reference Range Interpretation Comments

 

             PROTIME PATIENT (test              See_Comment                [Auto

mated message]



             code = 5964-2)                                        The system [employer]

ich



                                                                 generated this 

result



                                                                 transmitted ref

erence



                                                                 range: 10.1 - 1

2.6



                                                                 Seconds. The re

ference



                                                                 range was not u

sed to



                                                                 interpret this 

result



                                                                 as normal/abnor

mal.

 

             INR (test code = 6301-6)                                        Nor

mal INR <1.1;



                                                                 Warfarin Therap

eutic



                                                                 range 2.0 to 3.

0 or



                                                                 2.5 to 3.5, dep

ending



                                                                 upon the indica

tions.

 

             Lab Interpretation (test Normal                                 



             code = 34993-4)                                        



NPI:[id number] PARTIAL THRMPLAS [id number] 11:29:46





             Test Item    Value        Reference Range Interpretation Comments

 

             APTT Patient (test code              See_Comment  L             [Au

tomated message]



             = 3173-2)                                           The system [employer]

h



                                                                 generated this 

result



                                                                 transmitted ref

erence



                                                                 range: 26 - 36



                                                                 Seconds. The



                                                                 reference range

 was



                                                                 not used to int

erpret



                                                                 this result as



                                                                 normal/abnormal

.

 

             Lab Interpretation (test Abnormal                               



             code = 72058-2)                                        



NPI:[id number] WITH [id number] 11:22:42





             Test Item    Value        Reference Range Interpretation Comments

 

             WBC (test code =              See_Comment                [Automated



             6690-2)                                             message] The sy

stem



                                                                 which generated



                                                                 this result



                                                                 transmitted



                                                                 reference range

:



                                                                 4.30 - 11.10



                                                                 10*3/?L. The



                                                                 reference range

 was



                                                                 not used to



                                                                 interpret this



                                                                 result as



                                                                 normal/abnormal

.

 

             RBC (test code =              See_Comment  L             [Automated



             789-8)                                              message] The sy

stem



                                                                 which generated



                                                                 this result



                                                                 transmitted



                                                                 reference range

:



                                                                 3.93 - 5.25



                                                                 10*6/?L. The



                                                                 reference range

 was



                                                                 not used to



                                                                 interpret this



                                                                 result as



                                                                 normal/abnormal

.

 

             HGB (test code = 11.6 g/dL    11.6-15.0                 



             718-7)                                              

 

             HCT (test code = 35.2 %       35.7-45.2    L            



             4544-3)                                             

 

             MCV (test code = 92.6 fL      80.6-95.5                 



             787-2)                                              

 

             MCH (test code = 30.5 pg      25.9-32.8                 



             785-6)                                              

 

             MCHC (test code = 33.0 g/dL    31.6-35.1                 



             786-4)                                              

 

             RDW-SD (test code = 47.0 fL      39.0-49.9                 



             91934-4)                                            

 

             RDW-CV (test code = 13.8 %       12.0-15.5                 



             788-0)                                              

 

             PLT (test code =              See_Comment                [Automated



             777-3)                                              message] The sy

stem



                                                                 which generated



                                                                 this result



                                                                 transmitted



                                                                 reference range

:



                                                                 166 - 358 10*3/

?L.



                                                                 The reference r

raine



                                                                 was not used to



                                                                 interpret this



                                                                 result as



                                                                 normal/abnormal

.

 

             MPV (test code = 10.8 fL      9.5-12.9                  



             75279-2)                                            

 

             NRBC/100 WBC (test              See_Comment                [Automat

ed



             code = 2474413495)                                        message] 

The system



                                                                 which generated



                                                                 this result



                                                                 transmitted



                                                                 reference range

:



                                                                 0.0 - 10.0 /100



                                                                 WBCs. The refer

ence



                                                                 range was not u

sed



                                                                 to interpret th

is



                                                                 result as



                                                                 normal/abnormal

.

 

             NRBC x10^3 (test code <0.01        See_Comment                [Auto

mated



             = [id number])                                        message] The s

ystem



                                                                 which generated



                                                                 this result



                                                                 transmitted



                                                                 reference range

:



                                                                 10*3/?L. The



                                                                 reference range

 was



                                                                 not used to



                                                                 interpret this



                                                                 result as



                                                                 normal/abnormal

.

 

             GRAN MAT (NEUT) % 89.4 %                                 



             (test code = 770-8)                                        

 

             IMM GRAN % (test code 0.50 %                                 



             = [id number])                                        

 

             LYMPH % (test code = 7.0 %                                  



             736-9)                                              

 

             MONO % (test code = 3.0 %                                  



             5905-5)                                             

 

             EOS % (test code = 0.0 %                                  



             713-8)                                              

 

             BASO % (test code = 0.1 %                                  



             706-2)                                              

 

             GRAN MAT x10^3(ANC) 9.90 10*3/uL 1.88-7.09    H            



             (test code =                                        



             8552879351)                                         

 

             IMM GRAN x10^3 (test 0.05 10*3/uL 0.00-0.06                 



             code = 2848223290)                                        

 

             LYMPH x10^3 (test code 0.78 10*3/uL 1.32-3.29    L            



             = 731-0)                                            

 

             MONO x10^3 (test code 0.33 10*3/uL 0.33-0.92                 



             = 742-7)                                            

 

             EOS x10^3 (test code = <0.03        0.03-0.39    L            



             711-2)                                              

 

             BASO x10^3 (test code <0.03        0.01-0.07                 



             = 704-7)                                            

 

             Lab Interpretation Abnormal                               



             (test code = 76063-4)                                        



NPI:[id number]

## 2022-05-10 NOTE — ER
Nurse's Notes                                                                                     

 [facility]                                                                 

Name: Ashley Ayala                                                                             

Age: 46 yrs                                                                                       

Sex: Female                                                                                       

: 1975                                                                                   

MRN: [id number]                                                                                   

Arrival Date: 05/10/2022                                                                          

Time: 21:11                                                                                       

Account#: [id number]                                                                            

Bed 28                                                                                            

Private MD:                                                                                       

Diagnosis: Pain in left knee                                                                      

                                                                                                  

Presentation:                                                                                     

05/10                                                                                             

21:28 Chief complaint: Patient states: Left leg swelling x 3 days, denies injury, reports     lp1 

      severe pelvic pain that is worse than normal with her menstrual cycle. Coronavirus          

      screen: At this time, the client does not indicate any symptoms associated with             

      coronavirus-19. Ebola Screen: No symptoms or risks identified at this time. Initial         

      Sepsis Screen: Does the patient meet any 2 criteria? No. Patient's initial sepsis           

      screen is negative. Does the patient have a suspected source of infection? No.              

      Patient's initial sepsis screen is negative. Risk Assessment: Do you want to hurt           

      yourself or someone else? Patient reports no desire to harm self or others. Onset of        

      symptoms was May 10, 2022.                                                                  

21:28 Method Of Arrival: Ambulatory                                                           lp1 

21:28 Acuity: GURVINDER 3                                                                           lp1 

                                                                                                  

Historical:                                                                                       

- Allergies:                                                                                      

21:29 Codeine;                                                                                lp1 

21:29 Hydrocodone-Acetaminophen;                                                              lp1 

21:29 PENICILLINS;                                                                            lp1 

- Home Meds:                                                                                      

21:29 None [Active];                                                                          lp1 

- PMHx:                                                                                           

21:29 Ovarian cysts;                                                                          lp1 

- PSHx:                                                                                           

21:29 Cholecystectomy; tubal pregnancy;                                                       lp1 

                                                                                                  

- Immunization history:: Adult Immunizations up to date.                                          

- Social history:: Smoking status: Patient reports the use of cigarette tobacco                   

  products, smokes one pack cigarettes per day.                                                   

- Family history:: not pertinent.                                                                 

- Hospitalizations: : No recent hospitalization is reported.                                      

                                                                                                  

                                                                                                  

Screenin:30 Abuse screen: Denies threats or abuse. Nutritional screening: No deficits noted.        jb4 

      Tuberculosis screening: No symptoms or risk factors identified.                             

21:30 Fall Risk None identified.                                                              jb4 

                                                                                                  

Assessment:                                                                                       

21:30 General: Appears in no apparent distress. comfortable, Behavior is calm, cooperative,   jb4 

      appropriate for age. Pain: Complains of pain in left knee Pain does not radiate. Pain       

      currently is 7 out of 10 on a pain scale. Neuro: Level of Consciousness is awake,           

      alert, obeys commands, Oriented to person, place, time, situation. Cardiovascular:          

      Patient's skin is warm and dry. Respiratory: Airway is patent Respiratory effort is         

      even, unlabored, Respiratory pattern is regular, symmetrical. GI: No signs and/or           

      symptoms were reported involving the gastrointestinal system. : No signs and/or           

      symptoms were reported regarding the genitourinary system. EENT: No signs and/or            

      symptoms were reported regarding the EENT system. Derm: Skin is intact, Skin is pink,       

      warm \T\ dry. Musculoskeletal: Circulation, motion, and sensation intact. Range of          

      motion: intact in all extremities, Swelling present in left knee.                           

22:42 Reassessment: Patient appears in no apparent distress at this time. Patient and/or      jb4 

      family updated on plan of care and expected duration. Pain level reassessed. Patient is     

      alert, oriented x 3, equal unlabored respirations, skin warm/dry/pink.                      

                                                                                                  

Vital Signs:                                                                                      

21:28  / 78; Pulse 56; Resp 18; Temp 97.8(TE); Pulse Ox 99% on R/A; Weight 88.45 kg     lp1 

      (R); Height 5 ft. 5 in. (165.10 cm); Pain 8/10;                                             

21:28 Body Mass Index 32.45 (88.45 kg, 165.10 cm)                                             lp1 

                                                                                                  

ED Course:                                                                                        

21:11 Patient arrived in ED.                                                                  bp1 

21:22 Cj Loco MD is Attending Physician.                                                rn  

21:29 Triage completed.                                                                       lp1 

21:29 Arm band placed on.                                                                     lp1 

21:30 Patient has correct armband on for positive identification. Bed in low position. Call   jb4 

      light in reach. Side rails up X 1.                                                          

22:24 Extremity Venous Uni Ltd US In Process Unspecified.                                     EDMS

22:24 US Pelvis Complete In Process Unspecified.                                              EDMS

22:40 Lavell Houser RN is Primary Nurse.                                                     jb4 

22:42 No provider procedures requiring assistance completed. Patient did not have IV access   jb4 

      during this emergency room visit.                                                           

                                                                                                  

Administered Medications:                                                                         

No medications were administered                                                                  

                                                                                                  

                                                                                                  

Outcome:                                                                                          

22:30 Discharge ordered by MD.                                                                rn  

22:42 Discharged to home ambulatory.                                                          jb4 

22:42 Condition: stable                                                                           

22:42 Discharge instructions given to patient, Instructed on discharge instructions, follow       

      up and referral plans. Demonstrated understanding of instructions, follow-up care.          

22:43 Patient left the ED.                                                                    jb4 

                                                                                                  

Signatures:                                                                                       

Dispatcher [employer]                           EDCj Tong MD MD   rn                                                   

Mercado, Stacia, RN                         RN   lp1                                                  

Lavell Houser RN                       RN   jb4                                                  

Elda Chopra                                                  

                                                                                                  

**************************************************************************************************

## 2022-05-10 NOTE — EDPHYS
Physician Documentation                                                                           

 [facility]                                                                 

Name: Ashley Ayala                                                                             

Age: 46 yrs                                                                                       

Sex: Female                                                                                       

: 1975                                                                                   

MRN: [id number]                                                                                   

Arrival Date: 05/10/2022                                                                          

Time: 21:11                                                                                       

Account#: [id number]                                                                            

Bed 28                                                                                            

Private MD:                                                                                       

ED Physician Cj Loco                                                                         

HPI:                                                                                              

05/10                                                                                             

22:07 This 46 yrs old Female presents to ER via Ambulatory with complaints of Abdominal Pain, rn  

      Leg Swelling.                                                                               

22:07 The patient presents with pain, swelling. The complaints affect the posterior aspect of rn  

      left knee and left knee. Onset: The symptoms/episode began/occurred 3 day(s) ago.           

      Modifying factors: The symptoms are alleviated by remaining still, the symptoms are         

      aggravated by movement, bending knee. Associated signs and symptoms: Pertinent              

      positives: swelling, Pertinent negatives fever, warmth, weakness. Severity of symptoms:     

      At their worst the symptoms were mild, in the emergency department the symptoms are         

      unchanged. The patient has not experienced similar symptoms in the past. The patient        

      has not recently seen a physician. Pt reports here for 2 reasons, left knee pain and        

      pelvic pain. Reports left knee hurting for 3 days, was cleaning on on her knees a lot       

      lately. No fever. NO hx of gout. No direct fall or trauma to knee. Also reports just        

      started menses, had tubal ligation, hx of cysts, and states lower abd cramping, thinks      

      is cysts and would like to be evaluated for that as well. No                                

      fever/vomiting/diarrhea/blood in stool..                                                    

                                                                                                  

Historical:                                                                                       

- Allergies:                                                                                      

21:29 Codeine;                                                                                lp1 

21:29 Hydrocodone-Acetaminophen;                                                              lp1 

21:29 PENICILLINS;                                                                            lp1 

- Home Meds:                                                                                      

21:29 None [Active];                                                                          lp1 

- PMHx:                                                                                           

21:29 Ovarian cysts;                                                                          lp1 

- PSHx:                                                                                           

21:29 Cholecystectomy; tubal pregnancy;                                                       lp1 

                                                                                                  

- Immunization history:: Adult Immunizations up to date.                                          

- Social history:: Smoking status: Patient reports the use of cigarette tobacco                   

  products, smokes one pack cigarettes per day.                                                   

- Family history:: not pertinent.                                                                 

- Hospitalizations: : No recent hospitalization is reported.                                      

                                                                                                  

                                                                                                  

ROS:                                                                                              

22:07 Constitutional: Negative for fever, chills, and weight loss, Eyes: Negative for injury, rn  

      pain, redness, and discharge, Neck: Negative for injury, pain, and swelling,                

      Cardiovascular: Negative for chest pain, palpitations, and edema, Respiratory: Negative     

      for shortness of breath, cough, wheezing, and pleuritic chest pain, Abdomen/GI: + lower     

      abd pain and cramping, negative for vomiting/diarrhea/blood in stool Back: Negative for     

      injury and pain, : Negative for injury, bleeding, discharge, and swelling,                

      MS/Extremity: + left knee pain and swelling Skin: Negative for injury, rash, and            

      discoloration, Neuro: Negative for headache, weakness, numbness, tingling, and seizure.     

                                                                                                  

Exam:                                                                                             

22:07 Constitutional:  This is a well developed, well nourished patient who is awake, alert,  rn  

      and in no acute distress. Abdomen/GI:  soft, no focal tenderness, no distension or          

      swelling. No peritoneal signs.  Skin:  Warm, dry with normal turgor.  Normal color with     

      no rashes, no lesions, and no evidence of cellulitis. MS/ Extremity:  Pulses equal, no      

      cyanosis.  Neurovascular intact.  Full, normal range of motion.  LLE slight increase in     

      circumference compared to RLE, primarliy about the left knee, + tenderness posterior to     

      left knee and anteriorly just inferior to patella, no bony tenderness.                      

                                                                                                  

Vital Signs:                                                                                      

21:28  / 78; Pulse 56; Resp 18; Temp 97.8(TE); Pulse Ox 99% on R/A; Weight 88.45 kg     lp1 

      (R); Height 5 ft. 5 in. (165.10 cm); Pain 8/10;                                             

21:28 Body Mass Index 32.45 (88.45 kg, 165.10 cm)                                             lp1 

                                                                                                  

MDM:                                                                                              

21:22 Patient medically screened.                                                             rn  

22:27 Differential diagnosis: baker's cyst, knee effusion, ovarian cyst, arthritis. Data      rn  

      reviewed: vital signs, nurses notes, radiologic studies, ultrasound, and as a result, I     

      will discharge patient. Counseling: I had a detailed discussion with the patient and/or     

      guardian regarding: the historical points, exam findings, and any diagnostic results        

      supporting the discharge/admit diagnosis, radiology results, the need for outpatient        

      follow up, to return to the emergency department if symptoms worsen or persist or if        

      there are any questions or concerns that arise at home. Response to treatment: the          

      patient's symptoms have mildly improved after treatment, and as a result, I will            

      discharge patient. Special discussion: I discussed with the patient/guardian in detail      

      that at this point there is no indication for admission to the hospital. It is              

      understood, however, that if the symptoms persist or worsen the patient needs to return     

      immediately for re-evaluation. ED course: NO acute findings on u/s pelvis or LLE. +         

      pre-patellar swelling and possible cyst, will dc home with rest/heat/elevation/knee         

      brace. No fever, no warmth, no complaints to indicate infection. No direct trauma of        

      knee. Return precautions given and understood..                                             

                                                                                                  

05/10                                                                                             

21:39 Order name: Extremity Venous Uni [facility] US                                                 rn  

05/10                                                                                             

21:39 Order name: US Pelvis Complete                                                          rn  

                                                                                                  

Administered Medications:                                                                         

No medications were administered                                                                  

                                                                                                  

                                                                                                  

Disposition Summary:                                                                              

05/10/22 22:30                                                                                    

Discharge Ordered                                                                                 

      Location: Home                                                                          rn  

      Problem: new                                                                            rn  

      Symptoms: have improved                                                                 rn  

      Condition: Stable                                                                       rn  

      Diagnosis                                                                                   

        - Pain in left knee                                                                   rn  

      Followup:                                                                               rn  

        - With: Private Physician                                                                  

        - When: As needed                                                                          

        - Reason: Recheck today's complaints, Re-evaluation by your physician                      

      Discharge Instructions:                                                                     

        - Discharge Summary Sheet                                                             rn  

        - Joint Pain                                                                          rn  

        - Acute Knee Pain, Adult                                                              rn  

      Forms:                                                                                      

        - Medication Reconciliation Form                                                      rn  

        - Thank You Letter                                                                    rn  

        - Antibiotic Education                                                                rn  

        - Prescription Opioid Use                                                             rn  

Signatures:                                                                                       

Dispatcher [employer]                           Cj Lewis MD MD rn Pena, Laura, RN                         RN   lp1                                                  

                                                                                                  

**************************************************************************************************

## 2022-05-11 NOTE — RAD REPORT
EXAM DESCRIPTION:  Pelvis Complete 5/10/2022 10:59 PM CDT

 

CLINICAL HISTORY:  46 years, Female, pelvic pain, hx of ovarian cysts

 

COMPARISON:  None.

 

TECHNIQUE:  Utilizing a curved array transducer, real-time ultrasound evaluation of the female pelvis
 was performed. Color Doppler imaging was used to assess vascular flow. Patient refused transvaginal 
ultrasound

 

FINDINGS:  The uterus measures 9.2 x 5.2 x 5.6 cm.   The endometrial stripe demonstrate to be normal 
and measure 8.4 mm, no focal masses were identified within the uterus.

The right ovary measured 2.1 x 2.2 x 1.9 cm, the left ovary measures 2.7 x 2.2 x 2.3 cm. There is nor
mal vascular flow and spectral waveforms with no evidence for torsion.

No free fluid was identified in the posterior cul-de-sac, no adnexal masses seen.

 

IMPRESSION:  Unremarkable pelvic ultrasound.

 

Electronically signed by:   Shaggy Ames MD   5/10/2022 11:01 PM CDT Workstation: [id number]

 

 

Due to temporary technical issues with the PACS/Fluency reporting system, reports are being signed by
 the in house radiologist without review as a courtesy to ensure prompt reporting. The interpreting r
adiologist is fully responsible for the content of the report.

## 2022-05-11 NOTE — RAD REPORT
EXAM DESCRIPTION:  Extremity Venous [facility]

 

CLINICAL HISTORY:  46 years   Female   pain to front and back of knee

 

COMPARISON:  None.

 

TECHNIQUE:  Duplex and color Doppler imaging performed to evaluate the extremity deep venous structur
es. Compression imaging and augmentation imaging performed. Grayscale, color Doppler and gated duplex
 imaging with spectral analysis were performed.

 

FINDINGS:  There is a 46 x 14 mm fluid collection at the anterior lateral aspect of the knee in the r
egion of swelling.

No thrombus is identified in the deep venous structures imaged.

There is normal flow, compressibility, and augmentation throughout.

 

IMPRESSION:  No DVT is identified.

 

Electronically signed by:   Mark Halsted   5/10/2022 10:57 PM CDT Workstation: [id number]

 

 

Due to temporary technical issues with the PACS/Fluency reporting system, reports are being signed by
 the in house radiologist without review as a courtesy to ensure prompt reporting. The interpreting r
adiologist is fully responsible for the content of the report.

## 2023-02-14 ENCOUNTER — HOSPITAL ENCOUNTER (EMERGENCY)
Dept: HOSPITAL 97 - ER | Age: 48
Discharge: HOME | End: 2023-02-14
Payer: SELF-PAY

## 2023-02-14 VITALS — TEMPERATURE: 97 F | OXYGEN SATURATION: 100 % | DIASTOLIC BLOOD PRESSURE: 87 MMHG | SYSTOLIC BLOOD PRESSURE: 135 MMHG

## 2023-02-14 DIAGNOSIS — M54.12: ICD-10-CM

## 2023-02-14 DIAGNOSIS — S13.4XXA: Primary | ICD-10-CM

## 2023-02-14 DIAGNOSIS — S10.93XA: ICD-10-CM

## 2023-02-14 PROCEDURE — 70450 CT HEAD/BRAIN W/O DYE: CPT

## 2023-02-14 PROCEDURE — 72125 CT NECK SPINE W/O DYE: CPT

## 2023-02-14 NOTE — XMS REPORT
Continuity of Care Document

                          Created on:2023



Patient:HOMER AYALA

Sex:Female

:1975

External Reference #:[id number]





Demographics







                          Address                   [address]  



                                                    [place] [address]

 

                          Home Phone                [phone]

 

                          Mobile Phone              [phone]

 

                          Email Address             NONE

 

                          Preferred Language        en

 

                          Marital Status            Unknown

 

                          Roman Catholic Affiliation     Unknown

 

                          Race                      Unknown

 

                          Additional Race(s)        Unavailable



                                                    White

 

                          Ethnic Group              Unknown









Author







                          Organization              [facility]

t

 

                          Address                   [address]



                                                    [place] [address]

 

                          Phone                     [phone]









Support







                Name            Relationship    Address         Phone

 

                Val Ayala  Mother          [address]  [phone]



                                                [place] [address] 

 

                Nickolas Ayala Spouse          Unavailable     [phone]









Care Team Providers







                    Name                Role                Phone

 

                    José Antonio Lr Primary Care Physician [phone]

 

                    Shelley Draper RN     Attending Clinician Unavailable

 

                    KEN PADILLA      Attending Clinician Unavailable

 

                    KEN PADILLA      Attending Clinician Unavailable

 

                    CAMILO PITT     Attending Clinician Unavailable

 

                    Yoandy ASHTON MD, [place] Attending Clinician [phone]

 

                    Doctor Unassigned, No Name Attending Clinician Unavailable

 

                    Giselle Renee Attending Clinician [phone]

 

                    BRIT LANGE Attending Clinician Unavailable

 

                    [facility] Unavailable

 

                    Honey Moreno Attending Clinician [phone]

 

                    Nazario Brit ACOSTA Attending Clinician [phone]

[phone]

 

                    KEN PADILLA      Admitting Clinician Unavailable









Payers







           Payer Name Policy Type Policy Number Effective Date Expiration Date S

ource







Problems







       Condition Condition Condition Status Onset  Resolution Last   Treating Co

mments 

Source



       Name   Details Category        Date   Date   Treatment Clinician        



                                                 Date                 

 

       Cervical Cervical Disease Active                              Unive

rs



       stenosis stenosis               2-16                               ity of



       of spine of spine               00:00:                             [place]



                                   00                                 Medical



                                                                      Branch

 

       Abnormalit Abnormalit Disease Active                              U

nivers



       y of right y of right               6-04                               it

y of



       breast on breast on               00:00:                             [place]

[place]



       screening screening               00                                 Medi

allyson



       mammogram mammogram                                                  Bran

ch

 

       Right  Right  Disease Active                       Overview: Univer

s



       ovarian ovarian                                       Formattin ity o

f



       cyst   cyst                 00:00:                      g of this [place]



                                   00                          note   Medical



                                                               might be Branch



                                                               different 



                                                               from the 



                                                               original. 



                                                               1.8 x 1.8 



                                                               x 2.1 cm 



                                                               right  



                                                               ovarian 



                                                               hemorrhag 



                                                               ic cyst 



                                                               on 19 



                                                               pelvic US 

 

       Intramural Intramural Disease Active                       Overview

: Univers



       leiomyoma leiomyoma                                       Formattin i

ty of



       of uterus of uterus               00:00:                      g of this [place]

[place]



                                   00                          note   Medical



                                                               might be Branch



                                                               different 



                                                               from the 



                                                               original. 



                                                               1 x 0.8 



                                                               cm on  



                                                               19 



                                                               pelvic US 

 

       Screen for Screen for Disease Active                              U

nivers



       STD    STD                                                 ity of



       (sexually (sexually               00:00:                             [place]

[place]



       transmitte transmitte               00                                 Me

dical



       d disease) d disease)                                                  Br

anch

 

       BMI    BMI    Disease Active                              Univers



       31.0-31.9, 31.0-31.9,               -                               it

y of



       adult  adult                00:00:                             [place]



                                   00                                 Medical



                                                                      Branch

 

       Positive Positive Disease Active                              Unive

rs



       depression depression                                              it

y of



       screening screening               00:00:                             [place]

[place]



                                                                    Medical



                                                                      Branch

 

       S/P tubal S/P tubal Disease Active                              Uni

vers



       ligation ligation                                              ity of



                                   00:00:                             [place]



                                   00                                 Medical



                                                                      Branch

 

       Trichomona Trichomona Disease Active                              U

nivers



       l      l                                                   ity of



       vulvovagin vulvovagin               00:00:                             [place]

[place]



       itis   itis                                                  Medical



                                                                      Branch

 

       Menorrhagi Menorrhagi Disease Active                              U

nivers



       a with a with               -29                               ity of



       regular regular               00:00:                             [place]



       cycle  cycle                                                 Medical



                                                                      Branch

 

       Enlarged Enlarged Disease Active                              Unive

rs



       uterus uterus                                              ity of



                                   00:00:                             [place]



                                   00                                 Medical



                                                                      Branch

 

       S/P tubal S/P tubal Disease Active                              Uni

vers



       ligation ligation                                              ity of



                                   00:00:                             [place]



                                   00                                 Medical



                                                                      Branch

 

       Menorrhagi Menorrhagi Disease Active                              U

nivers



       a with a with               -29                               ity of



       regular regular               00:00:                             [place]



       cycle  cycle                                                 Medical



                                                                      Branch







Allergies, Adverse Reactions, Alerts







       Allergy Allergy Status Severity Reaction(s) Onset  Inactive Treating Comm

ents 

Source



       Name   Type                        Date   Date   Clinician        

 

       Penicill Drug   Active        Hives                        Univers



       in     Allergy                                              ity of



                                          00:00:                      [place]



                                          00                          Medical



                                                                      Branch

 

       PENICILL DRUG   Active Med    Hives  -                      Univers



       IN     INGREDI                      -                        ity of



                                          00:00:                      [place]



                                          00                          Medical



                                                                      Branch

 

       Penicill Drug   Active        Hives  -                      Univers



       in     Allergy                      -                        ity of



                                          00:00:                      [place]



                                          00                          Medical



                                                                      Branch

 

       Amoxicil Propensi Active        Hives                        Univer

s



       frandy    ty to                                               ity of



              adverse                      00:00:                      [place]



              reaction                      00                          Medical



              s                                                       Branch

 

       AMOXICIL DRUG   Active        Hives                        Univers



       FRANDY    INGREDI                                              ity of



                                          00:00:                      [place]



                                          00                          Medical



                                                                      Branch

 

       Hydrocod Propensi Active        Nausea 2016                      Univer

s



       one-Acet ty to                and/or 2-                        ity of



       aminophe adverse               Vomiting 00:00:                      [place]



       n      reaction                      00                          Medical



              s                                                       Branch

 

       HYDROCOD DRUG   Active        N/V    2016                      Univers



       ONE-ACET                             2-                        ity of



       AMINOPHE                             00:00:                      [place]



       N                                  00                          Medical



                                                                      Branch

 

       Codeine Propensi Active        Rash   -                      Univers



              ty to                                               ity of



              adverse                      00:00:                      [place]



              reaction                      00                          Medical



              s                                                       Branch

 

       CODEINE DRUG   Active        Rash   -                      Univers



              INGREDI                                              ity of



                                          00:00:                      [place]



                                          00                          Medical



                                                                      Branch







Social History







           Social Habit Start Date Stop Date  Quantity   Comments   Source

 

           History of tobacco 1999            Cigarette Smoker            

University of



           use        00:00:00                                    [facility]

 

           History [facility] o

f



           [facility]



                                                                  Branch

 

           History [facility]                                             University o

f



           Alcohol Std Drinks                                             [facility]



                                                                  Branch

 

           History [facility]                                             University o

f



           Alcohol Binge                                             [place] [facility]

al



                                                                  Branch

 

           Exposure to                       Not sure              University of



           SARS-CoV-2 (event)                                             [facility]

 

           Alcohol intake 2022 Current drinker            Unive

rsity of



                      00:00:00   00:00:00   of alcohol            [facility]



                                            (finding)             Branch

 

           Cigarettes smoked 2019                       Univers

ity of



           current (pack per 00:00:00   00:00:00                         [facility]

edical



           day) - Reported                                             Branch

 

           Alcohol Comment 2019 ocassional            Universit

y of



                      00:00:00   00:00:00                         [facility]

 

           Tobacco use and 2019 Smokeless tobacco            Un

iversity of



           exposure   00:00:00   00:00:00   non-user              [facility]

 

           Sex Assigned At 1975                       Universit

y of



           Birth      00:00:00   00:00:00                         [facility]









                Smoking Status  Start Date      Stop Date       Source

 

                Smokes tobacco daily 2019 00:00:00                 [facility]

ity of [facility]







Medications







       Ordered Filled Start  Stop   Current Ordering Indication Dosage Frequency

 Signature

                    Comments            Components          Source



     Medication Medication Date Date Medication? Clinician                (SIG) 

          



     Name Name                                                   

 

     FENTanyl PF       No             25ug      25 mcg,           Un

jeramy



     (SUBLIMAZE                                Slow IV           ity o

f



     (PF))      17:30: 16:24                          Push,           [place]



     injection      00   :00                           ONCE, 1           Medical



     25 mcg                                         dose, On           Branch



                                                  Thu            



                                                  22 at           



                                                  1130,           



                                                  Routine           

 

     famotidine            Yes            20mg      20 mg,           Unive

rs



     (PEPCID AC)      2-17                               Oral, BID,           it

y of



     tablet 20      14:00:                               First dose           [place]

[place]



     mg        00                                 on [facility]



                                                  22 at           Branch



                                                  0800,           



                                                  Until           



                                                  Discontinu           



                                                  ed,            



                                                  Routine           

 

     docusate            Yes            100mg      100 mg,           Unive

rs



     (COLACE)      2-17                               Oral, BID,           ity o

f



     capsule 100      14:00:                               First dose           

[place]



     mg        00                                 on [facility]



                                                  22 at           Branch



                                                  0800,           



                                                  Until           



                                                  Discontinu           



                                                  ed,            



                                                  Routine           

 

     gabapentin            Yes            300mg      300 mg,           Uni

vers



     (NEURONTIN)      2-17                               Oral, TID,           it

y of



     capsule 300      14:00:                               First dose           

[place]



     mg        00                                 on [facility]



                                                  22 at           Branch



                                                  0800,           



                                                  Until           



                                                  Discontinu           



                                                  ed,            



                                                  Routine           

 

     traMADoL            Yes            50mg      50 mg,           Univers



     (ULTRAM)      2-17                               Oral,           ity of



     tablet 50      03:35:                               Q6HPRN,           [place]



     mg        17                                 Starting           Medical



                                                  on Wed           [place]



                                                  22 at           



                                                  2135,           



                                                  Until           



                                                  Discontinu           



                                                  ed,            



                                                  Routine,           



                                                  Pain           



                                                  (scale           



                                                  4-6)           

 

     acetaminoph            Yes            325mg      325 mg,           Un

jeramy



     en        2-17                               Oral,           ity of



     (TYLENOL)      03:35:                               Q6HPRN,           [place]



     tablet 325      11                                 Starting           Medic

al



     mg                                           on Wed           [place]



                                                  22 at           



                                                  2135,           



                                                  Until           



                                                  Discontinu           



                                                  ed,            



                                                  Routine,           



                                                  Pain           



                                                  (scale           



                                                  1-3)           

 

     bisacodyL            Yes            10mg      10 mg,           Univer

s



     (DULCOLAX)      2-17                               Rectal,           ity of



     suppository      03:32:                               QHSPRN,           [place]

as



     10 mg      01                                 Starting           Medical



                                                  on Wed           Branch



                                                  22 at           



                                                  2132,           



                                                  Until           



                                                  Discontinu           



                                                  ed,            



                                                  Routine,           



                                                  Constipati           



                                                  on             

 

     NaCl 0.9%      0      Yes            5mL       5 mL, Slow           Un

jeramy



     (NS)      2-17                               IV Push,           ity of



     injection 5      03:32:                               PRN - SEE           T

[place]



     mL        01                                 INSTRUCTIO           Medical



                                                  NS,            Branch



                                                  Starting           



                                                  on 22 at           



                                                  2132,           



                                                  Until           



                                                  Discontinu           



                                                  ed, 10 mL           

 

     methylPREDN      -0      Yes       [id number]           Take by          

 Univers



     ISolone 4      2-17                               mouth           ity of



     mg tablets      00:00:                               SEE-INSTRU           T

exas



               00                                 CTIONS.           Medical



                                                  follow           Branch



                                                  package           



                                                  directions           

 

     methylPREDN      -0      Yes       [id number]           Take by          

 Univers



     ISolone 4      2-17                               mouth           ity of



     mg tablets      00:00:                               SEE-INSTRU           T

exas



               00                                 CTIONS.           Medical



                                                  follow           Branch



                                                  package           



                                                  directions           

 

     methylPREDN      -0      Yes       [id number]           Take by          

 [facility]



     ISolone 4      2-17                               mouth           ity of



     mg tablets      00:00:                               SEE-INSTRU           T

[place]



               00                                 CTIONS.           Medical



                                                  follow           Branch



                                                  package           



                                                  directions           

 

     methylPREDN      -0      Yes       [id number]           Take by          

 Univers



     ISolone 4      2-17                               mouth           ity of



     mg tablets      00:00:                               SEE-INSTRU           T

[place]



               00                                 CTIONS.           Medical



                                                  follow           Branch



                                                  package           



                                                  directions           

 

     methylPREDN      -0      Yes       [id number]           Take by          

 Univers



     ISolone 4      2-17                               mouth           ity of



     mg tablets      00:00:                               SEE-INSTRU           T

[place]



               00                                 CTIONS.           Medical



                                                  follow           Branch



                                                  package           



                                                  directions           

 

     methylPREDN      -0      Yes       [id number]           Take by          

 Univers



     ISolone 4      2-17                               mouth           ity of



     mg tablets      00:00:                               SEE-INSTRU           T

[place]



               00                                 CTIONS.           Medical



                                                  follow           Branch



                                                  package           



                                                  directions           

 

     gabapentin      -2022- No        [id number] 300mg      Take 1          

 Univers



     300 mg      2-17 -20                          capsule by           ity of



     capsule      00:00: 04:59                          mouth 3           [place]



               00   :00                           (three)           Medical



                                                  times           Branch



                                                  daily for           



                                                  30 days.           

 

     gabapentin      -0 - No        [id number] 300mg      Take 1          

 Univers



     300 mg      2-17 -20                          capsule by           ity of



     capsule      00:00: 04:59                          mouth 3           [place]



               00   :00                           (three)           Medical



                                                  times           Branch



                                                  daily for           



                                                  30 days.           

 

     gabapentin      2022- No        [id number] 300mg      Take 1          

 Univers



     300 mg      2-17 -20                          capsule by           ity of



     capsule      00:00: 04:59                          mouth 3           [place]



               00   :00                           (three)           Medical



                                                  times           Branch



                                                  daily for           



                                                  30 days.           

 

     LOESTRIN       2019- No        [id number] 1{tbl}      Take 1       

    Univers



     (MICROGESTI      5-29 07-29                          tablet by           it

y of



     N FE )      00:00: 00:00                          mouth           [place]



     1 mg-20 mcg      00   :00                           daily.           Medica

l



     (21)/75 mg                                                        Branch



     (7) tablet                                                        

 

     LOESTRIN       2019- No        [id number] 1{tbl}      Take 1       

    Univers



     (MICROGESTI      5-29 07-29                          tablet by           it

y of



     N FE )      00:00: 00:00                          mouth           [place]



     1 mg-20 mcg      00   :00                           daily.           Medica

l



     (21)/75 mg                                                        Branch



     (7) tablet                                                        

 

     LOESTRIN       2019- No        [id number] 1{tbl}      Take 1       

    Univers



     (MICROGESTI      5-29 07-29                          tablet by           it

y of



     N FE )      00:00: 00:00                          mouth           [place]



     1 mg-20 mcg      00   :00                           daily.           Medica

l



     (21)/75 mg                                                        Branch



     (7) tablet                                                        

 

     LOESTRIN       2019- No        [id number] 1{tbl}      Take 1       

    Univers



     (MICROGESTI      5-29 07-29                          tablet by           it

y of



     N FE )      00:00: 00:00                          mouth           [place]



     1 mg-20 mcg      00   :00                           daily.           Medica

l



     (21)/75 mg                                                        Branch



     (7) tablet                                                        

 

     No known                No                                      [facility]



     [facility]

 

     No known                No                                      [facility]



     [facility]

 

     No known                No                                      [facility]



     [facility]

 

     No known                No                                      [facility]



     [facility]







Vital Signs







             Vital Name   Observation Time Observation Value Comments     Source

 

             Systolic blood 2022 18:00:00 107 mm[Hg]                Univer

sity [facility] [place]

 

             Diastolic blood 2022 18:00:00 58 mm[Hg]                 [facility]

 

             Heart rate   2022 18:00:00 61 /min                   Universi

ty [facility]

 

             Body temperature 2022 18:00:00 36.44 Faith                 Univ

ersity of



                                                                 [facility]

 

             Respiratory rate 2022 18:00:00 20 /min                   [facility]



                                                                 [facility]

 

             Oxygen saturation in 2022 18:00:00 100 /min                  

University 



             Arterial blood by                                        [place] [facility]

[place]



             Pulse oximetry                                        Branch

 

             Body height  2022 03:37:00 165.1 cm                  Universi

ty of



                                                                 [facility]

 

             Body weight  2022 01:00:00 86.183 kg                 Universi

ty of



                                                                 [facility]

 

             BMI          2022 01:00:00 31.62 kg/m2               Universi

ty of



                                                                 [facility]

 

             Systolic blood 2019 20:12:00 129 mm[Hg]                Univer

sity of



             pressure                                            [facility]

 

             Diastolic blood 2019 20:12:00 74 mm[Hg]                 Unive

rsity of



             [facility]

 

             Heart rate   2019 20:12:00 57 /min                   Universi

ty of



                                                                 [facility]

 

             Body temperature 2019 20:12:00 36.11 Faith                 Univ

ersity of



                                                                 [facility]

 

             Respiratory rate 2019 20:12:00 16 /min                   Univ

ersity of



                                                                 [facility]

 

             Body height  2019 20:12:00 165.1 cm                  Universi

ty of



                                                                 [place] [facility]



                                                                 [place]

 

             Body weight  2019 20:12:00 85.333 kg                 Universi

ty of



                                                                 [place] [facility]



                                                                 [facility]anch

 

             BMI          2019 20:12:00 31.31 kg/m2               Universi

ty of



                                                                 [facility]

 

             Systolic blood 2019 20:37:00 116 mm[Hg]                Univer

sity of



             pressure                                            [facility]

 

             Diastolic blood 2019 20:37:00 80 mm[Hg]                 Unive

rsity of



             pressure                                            [facility]

 

             Body temperature 2019 20:32:00 36.44 Faith                 Univ

ersity of



                                                                 [facility]

 

             Respiratory rate 2019 20:32:00 16 /min                   Univ

ersity of



                                                                 [facility]

 

             Body height  2019 20:32:00 165.1 cm                  Universi

ty of



                                                                 [place] [facility]



                                                                 [place]

 

             Body weight  2019 20:32:00 85.276 kg                 Universi

ty of



                                                                 [facility]

 

             BMI          2019 20:32:00 31.28 kg/m2               Universi

ty of



                                                                 [facility]







Procedures







                Procedure       Date / Time     Performing Clinician Source



                                Performed                       

 

                AUTHORIZATION FOR 2022 05:01:00 Doctor Unassigned, No [facility]



                RELEASE OF PHI                  Name            Medical [place]

 

                EXTERNAL PROVIDER 2022 06:01:00 Doctor Unassigned, No [facility]



                RECORDS                         Name            [facility]

 

                ABORH CONFIRMATION (LAB 2022 11:40:00 Bhanu Melvin 

[facility]



                ONLY)                                           [facility]

 

                HB ABO GROUPING 2022 11:06:00 Bhanu Melvin [facility]

 

                BASIC METABOLIC PANEL 2022 11:05:00 [facility] Mel[facility] 

[facility]



                (NA, K, CL, CO2,                                 Medical Branch



                GLUCOSE, BUN,                                   



                CREATININE, CA)                                 

 

                CBC WITH DIFF   2022 11:05:00 Bhanu Melvin [facility]

 

                PROTHROMBIN TIME / INR 2022 11:05:00 [facility]

 

                ACTIVATED PARTIAL 2022 11:05:00 Bhanu Melvin [facility]







Encounters







        Start   End     Encounter Admission Attending Care    Care    Encounter 

Source



        Date/Time Date/Time Type    Type    Clinicians Facility Department ID   

   

 

        2023 Nurse           KADEN Draper    1.2.840.114 [id number]

[id number] Univers



        00:00:00 00:00:00 Triage          Shelley PANTOJA   350.1.13.10         [facility] [id number]         [place]

as



                                                        [phone]         [address]

 

        2022 Outpatient KEN ESCALANTE [facility]    

[id number] Univers



        11:00:00 11:00:00                 KEN PADILLA [facility]

 

        2022 Outpatient KEN ESCALANTE [facility]    

[id number] Univers



        15:00:00 15:00:00                 KEN PADILLA [facility]

 

        2022 Outpatient KEN ESCALANTE [facility]    

[id number] Univers



        14:30:00 14:30:00                 KEN PADILLA [facility]

 

        2022 Outpatient BAHMAN PITT  [facility]    [id number]

[id number] Univers



        11:01:18 23:59:00                 [place]                         ity o

f



                                                                        [facility]

 

        2022 [facility]    1.2.840.114 [id number]

[id number] Univers



        11:01:18 23:59:00 En[facility]         [facility]  350.1.13.10        

 ity of



                                                [place]   4.2.7.2.686         [place]

[place]



                                                [place]    [phone]         [address]



                                                OFFICE                  



                                                BUILDING                 

 

        2022 Orders          Doctor  KADEN    1.2.840.114 [id number]

[id number] Univers



        00:00:00 00:00:00 Only            Unassigned, KIT   350.1.13.10       

  ity of



                                        [facility] [id number]         [place]

as



                                                        [phone]         [address]

 

        2022 Orders          Doctor  KADEN    1.2.840.114 [id number]

[id number] Univers



        00:00:00 00:00:00 Only            Unassigned, KIT   350.1.13.10       

  ity of



                                        [facility] 4.2.7.2.686         [place]

as



                                                        [phone]         [address]

 

        2022 Outpatient U       RANDYKEN [facility]    S[facility]     

[id number] Univers



        20:51:00 14:00:00                 KEN PADILLA                        

 ity of



                                                                        [facility]

 

        2022 [facility]         HARVEY Padilla  1.2.840.114 [id number]

[id number] Univers



        20:51:00 14:00:00 Encounter         Ken PANTOJA   350.1.13.10         

ity of



                                                HOSPITAL [id number]         [place]

as



                                                        [phone]         Medi

[place]



                                                        097             Branch

 

        2021-03-15 2021-03-15 Outpatient R               [facility]    [id number]

116 Univers



        16:00:00 16:00:00                                                 ity of



                                                                        [facility]

 

        2021 Telephone         Manuelito [facility]    1.2.840.114 [id number]

[id number] Univers



        00:00:00 00:00:00                 Giselle DOAN OB/GYN  350.1.13.10         it

y of



                                                REGIONAL 4.2.7.2.686         [place]

as



                                                MATERNAL [phone]         Med

ical



                                                & CHILD [address] [place] [address]                 

 

        2021 Outpatient R       NAZARIO [facility]    [id number]

[id number] Univers



        08:45:00 08:45:00                 BRIT chavez



                                                                        [facility]

 

        2020 [facility]         [facility]    1.2.840.114 [id number]

[id number] Univers



        00:00:00 00:00:00                 Giselle DOAN OB/GYN  350.1.13.10         it

y of



                                                REGIONAL 4.2.7.2.686         [place]

as



                                                MATERNAL [phone]         Med

ical



                                                & CHILD [address] [place] [address]                 

 

        2019 Nurse           Visit, Ang-Rmchp Nurse [facility]    1.2

.840.114 [id number] 

Univers



        14:57:22 15:24:24 Visit           Honey Chavira OB/GY[facility]  350.1.13.10

         ity of



                                                REGIONAL 4.2.7.2.686         [place]

as



                                                MATERNAL [phone]         Med

ical



                                                & CHILD [address] [place] [address]                 

 

        2019 Nurse           Visit, Ang-Rmchp Nurse [facility]    1.2

.840.114 [id number] 

Univers



        16:05:21 16:13:38 Visit           Brit Lange OB/GYN  350.1.13.

10         ity of



                                                REGIONAL 4.2.7.2.686         [place]

as



                                                MATERNAL [phone]         Med

ical



                                                & CHILD [address] [place] [address]                 

 

        2019 Office          Fan [facility]    1.2.840.114 [id number]

[id number] Univers



        14:48:45 16:29:10 Visit           Honey RUGGIERO OB/GYN  350.1.13.10        

 ity of



                                                REGIONAL 4.2.7.2.686         [place]

as



                                                MATERNAL [phone]         Med

ical



                                                & CHILD [address] [place] [address]                 







Results







           Test Description Test Time  Test Comments Results    Result Comments 

Source









                    ABORH Confirmation (Lab Only) 2022 13:30:16 









                      Test Item  Value      Reference Range Interpretation Comme

nts









             ABO & RH (test code = 20) A Positive                             Pe

rformed at [facility] 

[facility]



                                                                 Blood [address]

[place], [place] [address]Qhpb

 Free: [phone] No.



                                                                 [id number]



[facility] (NA, K, CL, CO2, 
GLUCOSE, BUN, CREATININE, CA)2022 12:14:34





             Test Item    Value        Reference Range Interpretation Comments

 

             NA (test code = 135 mmol/L   135-145                   



             [id number])                                         

 

             K (test code = 4.3 mmol/L   3.5-5.0                   



             3118966237)                                         

 

             CL (test code = 108 mmol/L                       



             8888178200)                                         

 

             CO2 TOTAL (test code = 22 mmol/L    23-31        L            



             8190396804)                                         

 

             AGAP (test code =              2-16                      



             [id number])                                         

 

             BUN (test code = 12 mg/dL     7-23                      



             7734376690)                                         

 

             GLUCOSE (test code = 131 mg/dL           H            



             [id number])                                         

 

             CREATININE (test code = 0.70 mg/dL   0.50-1.04                 



             1359732206)                                         

 

             CALCIUM (test code = 8.3 mg/dL    8.6-10.6     L            



             9931653693)                                         

 

             eGFR (test code =              mL/min/1.73m2              



             2094194741)                                         

 

             KATTY (test code = KATTY) Association of                           



                          Glomerular Filtration                           



                          Rate (GFR) and Staging                           



                          of Kidney Disease*                           



                          +---------------------                           



                          --+-------------------                           



                          --+-------------------                           



                          ------+| GFR                           



                          (mL/min/1.73 m2) ?|                           



                          With Kidney Damage ?|                           



                          ?Without Kidney                           



                          Damage+---------------                           



                          --------+-------------                           



                          --------+-------------                           



                          ------------+| ?>90 ?                           



                          ? ? ? ? ? ? ? ?|                           



                          ?Stage one ? ? ? ? ?|                           



                          ? Normal ? ? ? ? ? ? ?                           



                          ?+--------------------                           



                          ---+------------------                           



                          ---+------------------                           



                          -------+| ?60-89 ? ? ?                           



                          ? ? ? ? ?| ?Stage two                           



                          ? ? ? ? ?| ? Decreased                           



                          GFR ? ? ? ?                            



                          +---------------------                           



                          --+-------------------                           



                          --+-------------------                           



                          ------+| ?30-59 ? ? ?                           



                          ? ? ? ? ?| ?Stage                           



                          three ? ? ? ?| ? Stage                           



                          three ? ? ? ? ?                           



                          +---------------------                           



                          --+-------------------                           



                          --+-------------------                           



                          ------+| ?15-29 ? ? ?                           



                          ? ? ? ? ?| ?Stage four                           



                          ? ? ? ? | ? Stage four                           



                          ? ? ? ? ?                              



                          ?+--------------------                           



                          ---+------------------                           



                          ---+------------------                           



                          -------+| ?<15 (or                           



                          dialysis) ? ?| ?Stage                           



                          five ? ? ? ? | ? Stage                           



                          five ? ? ? ? ?                           



                          ?+--------------------                           



                          ---+------------------                           



                          ---+------------------                           



                          -------+ *Each stage                           



                          assumes the associated                           



                          GFR level has been in                           



                          effect for at least                           



                          three months. ?Stages                           



                          1 to 5, with or                           



                          without kidney                           



                          disease, indicate                           



                          chronic kidney                           



                          disease. Notes:                           



                          Determination of                           



                          stages one and two                           



                          (with eGFR                             



                          >59mL/min/1.73 m2)                           



                          requires estimation of                           



                          kidney damage for at                           



                          least three months as                           



                          defined by structural                           



                          or functional                           



                          abnormalities of the                           



                          kidney, manifested by                           



                          either:Pathological                           



                          abnormalities or                           



                          Markers of kidney                           



                          damage (including                           



                          abnormalities in the                           



                          composition of the                           



                          blood or urine or                           



                          abnormalities in                           



                          imaging tests).                           

 

             Lab Interpretation Abnormal                               



             (test code = 63772-1)                                        



[facility] [id number] 
11:55:01





             Test Item    Value        Reference Range Interpretation Comments

 

             ABO & RH (test code A POSITIVE                             Performe

d at [facility]



             = 20)                                               Laboratory Serv

ices -



                                                                 [facility]

[address]



                                                                 [facility]

s



                                                                 [id number] Free:



                                                                 [phone]

A No.



                                                                 [id number]

 

             IAT (test code = Negative                               Performed a

t [facility]



             1185)                                               Laboratory Serv

ices -



                                                                 [facility]

[address]



                                                                 [facility]



                                                                 [id number] Free:



                                                                 [phone]

A No.



                                                                 [id number]



[facility] / [id number] 11:29:46





             Test Item    Value        Reference Range Interpretation Comments

 

             PROTIME PATIENT (test              See_Comment                [Auto

mated message]



             code = 5964-2)                                        The system [employer]



                                                                 generated this 

result



                                                                 transmitted ref

erence



                                                                 range: 10.1 - 1

2.6



                                                                 Seconds. The re

ference



                                                                 range was not u

sed to



                                                                 interpret this 

result



                                                                 as normal/abnor

mal.

 

             INR (test code = 6301-6)                                        Nor

mal INR <1.1;



                                                                 Warfarin Therap

eutic



                                                                 range 2.0 to 3.

0 or



                                                                 2.5 to 3.5, dep

ending



                                                                 upon the indica

tions.

 

             Lab Interpretation (test Normal                                 



             code = 49510-6)                                        



[facility] [id number] 
11:29:46





             Test Item    Value        Reference Range Interpretation Comments

 

             APTT Patient (test code              See_Comment  L             [Au

tomated message]



             = 3173-2)                                           The system [employer]

h



                                                                 generated this 

result



                                                                 transmitted ref

erence



                                                                 range: 26 - 36



                                                                 Seconds. The



                                                                 reference range

 was



                                                                 not used to int

erpret



                                                                 this result as



                                                                 normal/abnormal

.

 

             Lab Interpretation (test Abnormal                               



             code = 75814-3)                                        



[facility] WITH [id number] 11:22:42





             Test Item    Value        Reference Range Interpretation Comments

 

             WBC (test code =              See_Comment                [Automated



             6690-2)                                             message] The sy

stem



                                                                 which generated



                                                                 this result



                                                                 transmitted



                                                                 reference range

:



                                                                 4.30 - 11.10



                                                                 10*3/?L. The



                                                                 reference range

 was



                                                                 not used to



                                                                 interpret this



                                                                 result as



                                                                 normal/abnormal

.

 

             RBC (test code =              See_Comment  L             [Automated



             789-8)                                              message] The sy

stem



                                                                 which generated



                                                                 this result



                                                                 transmitted



                                                                 reference range

:



                                                                 3.93 - 5.25



                                                                 10*6/?L. The



                                                                 reference range

 was



                                                                 not used to



                                                                 interpret this



                                                                 result as



                                                                 normal/abnormal

.

 

             HGB (test code = 11.6 g/dL    11.6-15.0                 



             718-7)                                              

 

             HCT (test code = 35.2 %       35.7-45.2    L            



             4544-3)                                             

 

             MCV (test code = 92.6 fL      80.6-95.5                 



             787-2)                                              

 

             MCH (test code = 30.5 pg      25.9-32.8                 



             785-6)                                              

 

             MCHC (test code = 33.0 g/dL    31.6-35.1                 



             786-4)                                              

 

             RDW-SD (test code = 47.0 fL      39.0-49.9                 



             50238-6)                                            

 

             RDW-CV (test code = 13.8 %       12.0-15.5                 



             788-0)                                              

 

             PLT (test code =              See_Comment                [Automated



             777-3)                                              message] The sy

stem



                                                                 which generated



                                                                 this result



                                                                 transmitted



                                                                 reference range

:



                                                                 166 - 358 10*3/

?L.



                                                                 The reference r

raine



                                                                 was not used to



                                                                 interpret this



                                                                 result as



                                                                 normal/abnormal

.

 

             MPV (test code = 10.8 fL      9.5-12.9                  



             86571-8)                                            

 

             NRBC/100 WBC (test              See_Comment                [Automat

ed



             code = 2183152707)                                        message] 

The system



                                                                 which generated



                                                                 this result



                                                                 transmitted



                                                                 reference range

:



                                                                 0.0 - 10.0 /100



                                                                 WBCs. The refer

ence



                                                                 range was not u

sed



                                                                 to interpret th

is



                                                                 result as



                                                                 normal/abnormal

.

 

             NRBC x10^3 (test code <0.01        See_Comment                [Auto

mated



             = [id number])                                        message] The s

ystem



                                                                 which generated



                                                                 this result



                                                                 transmitted



                                                                 reference range

:



                                                                 10*3/?L. The



                                                                 reference range

 was



                                                                 not used to



                                                                 interpret this



                                                                 result as



                                                                 normal/abnormal

.

 

             GRAN MAT (NEUT) % 89.4 %                                 



             (test code = 770-8)                                        

 

             IMM GRAN % (test code 0.50 %                                 



             = [id number])                                        

 

             LYMPH % (test code = 7.0 %                                  



             736-9)                                              

 

             MONO % (test code = 3.0 %                                  



             5905-5)                                             

 

             EOS % (test code = 0.0 %                                  



             713-8)                                              

 

             BASO % (test code = 0.1 %                                  



             706-2)                                              

 

             GRAN MAT x10^3(ANC) 9.90 10*3/uL 1.88-7.09    H            



             (test code =                                        



             [id number])                                         

 

             IMM GRAN x10^3 (test 0.05 10*3/uL 0.00-0.06                 



             code = 7399086935)                                        

 

             LYMPH x10^3 (test code 0.78 10*3/uL 1.32-3.29    L            



             = 731-0)                                            

 

             MONO x10^3 (test code 0.33 10*3/uL 0.33-0.92                 



             = 742-7)                                            

 

             EOS x10^3 (test code = <0.03        0.03-0.39    L            



             711-2)                                              

 

             BASO x10^3 (test code <0.03        0.01-0.07                 



             = 704-7)                                            

 

             Lab Interpretation Abnormal                               



             (test code = 05910-8)                                        



[facility]

## 2023-02-14 NOTE — RAD REPORT
EXAM DESCRIPTION:  CT - [facility] - 2/14/2023 11:18 am

 

CLINICAL HISTORY:  Trauma, head and neck injury.

fall, head injury, neck pain, hx of cervical prob

 

COMPARISON:  C Spine Wo Con dated 2/16/2022

 

TECHNIQUE:  Axial 5 mm thick images of the head were obtained.

 

Axial 2 mm thick images of the cervical spine were obtained with sagittal and coronal reconstruction 
images generated and reviewed.

 

All CT scans are performed using dose optimization technique as appropriate and may include automated
 exposure control or mA/KV adjustment according to patient size.

 

FINDINGS:  CT HEAD WITHOUT CONTRAST:

 

No acute hemorrhage, hydrocephalus or extra-axial collection is identified.No areas of brain edema or
 midline shift.

 

The paranasal sinuses and mastoids are clear.The calvarium is intact.

 

CT CERVICAL SPINE WITHOUT CONTRAST:

 

No fracture or subluxation.No prevertebral soft tissues swelling is identified. Unfused posterior arc
h of C1. Cervical spondylosis with uncovertebral joint hypertrophy and posterior disc osteophyte comp
lexes at the C5-6 and C6-7 levels. There is evidence of neural foraminal narrowing bilaterally. Mild 
central spinal stenosis suspected at C6-7.

 

IMPRESSION:  No acute intracranial or cervical spine findings.

## 2023-02-14 NOTE — ER
Nurse's Notes                                                                                     

 [facility]                                                                 

Name: Ashley Ayala                                                                             

Age: 47 yrs                                                                                       

Sex: Female                                                                                       

: 1975                                                                                   

MRN: [id number]                                                                                   

Arrival Date: 2023                                                                          

Time: 10:39                                                                                       

Account#: [id number]                                                                            

Bed DIS1                                                                                          

Private MD:                                                                                       

Diagnosis: Contusion of unspecified part of neck, initial encounter;Sprain of ligaments of        

  cervical spine, initial encounter;Radiculopathy, cervical region                                

                                                                                                  

Presentation:                                                                                     

                                                                                             

11:26 Chief complaint: Patient states: Slipped and fell x 4 days ago, reports left side neck  jl7 

      pain with numbness. Coronavirus screen: Vaccine status: Patient reports being               

      unvaccinated. At this time, the client does not indicate any symptoms associated with       

      coronavirus-19. Ebola Screen: No symptoms or risks identified at this time. Initial         

      Sepsis Screen: Does the patient meet any 2 criteria? No. Patient's initial sepsis           

      screen is negative. Does the patient have a suspected source of infection? No.              

      Patient's initial sepsis screen is negative. Risk Assessment: Do you want to hurt           

      yourself or someone else? Patient reports no desire to harm self or others. Onset of        

      symptoms was 2023.                                                             

11:26 Method Of Arrival: Ambulatory                                                           jl7 

11:26 Acuity: GURVINDER 3                                                                           jl7 

                                                                                                  

Triage Assessment:                                                                                

11:28 General: Appears in no apparent distress. uncomfortable, Behavior is calm, cooperative, jl7 

      appropriate for age. Pain: Complains of pain in neck Pain currently is 8 out of 10 on a     

      pain scale.                                                                                 

                                                                                                  

OB/GYN:                                                                                           

11:28 LMP N/A - Birth control method                                                          jl7 

                                                                                                  

Historical:                                                                                       

- Allergies:                                                                                      

11:28 Codeine;                                                                                jl7 

11:28 Hydrocodone-Acetaminophen;                                                              jl7 

11:28 PENICILLINS;                                                                            jl7 

- Home Meds:                                                                                      

11:28 None [Active];                                                                          jl7 

- PMHx:                                                                                           

11:28 Ovarian cysts;                                                                          jl7 

- PSHx:                                                                                           

11:28 Cholecystectomy; tubal pregnancy;                                                       jl7 

                                                                                                  

- Immunization history:: Client reports having NOT received the Covid vaccine.                    

- Social history:: Smoking status: Patient reports the use of cigarette tobacco                   

  products, smokes one-half pack cigarettes per day.                                              

- Family history:: not pertinent.                                                                 

- Hospitalizations: : No recent hospitalization is reported.                                      

                                                                                                  

                                                                                                  

Screenin:42 [facility] ED Fall Risk Assessment (Adult) History of falling in the last 3 months,       ap3 

      including since admission No falls in past 3 months (0 pts). Abuse screen: Denies           

      threats or abuse. Nutritional screening: No deficits noted. Tuberculosis screening: No      

      symptoms or risk factors identified.                                                        

                                                                                                  

Vital Signs:                                                                                      

11:26  / 87; Pulse 67; Resp 15; Temp 97; Pulse Ox 100% ; Weight 92.99 kg; Height 5 ft.  jl7 

      5 in. (165.10 cm); Pain 8/10;                                                               

11:26 Body Mass Index 34.11 (92.99 kg, 165.10 cm)                                             jl7 

                                                                                                  

ED Course:                                                                                        

10:39 Patient arrived in ED.                                                                  as  

10:41 Cj Loco MD is Attending Physician.                                                rn  

11:28 Triage completed.                                                                       jl7 

11:28 Arm band placed on right wrist. Patient placed in waiting room, Patient notified of     jl7 

      wait time.                                                                                  

12:42 Irina Moses, RN is Primary Nurse.                                                  ap3 

12:42 Patient has correct armband on for positive identification.                             ap3 

12:42 No provider procedures requiring assistance completed.                                  ap3 

12:42 Patient did not have IV access during this emergency room visit.                        ap3 

                                                                                                  

Administered Medications:                                                                         

No medications were administered                                                                  

                                                                                                  

                                                                                                  

Medication:                                                                                       

12:42 VIS not applicable for this client.                                                     ap3 

                                                                                                  

Outcome:                                                                                          

12:36 Discharge ordered by MD.                                                                rn  

12:42 Discharged to home ambulatory.                                                          ap3 

12:42 Condition: good                                                                             

12:42 Discharge instructions given to patient, Instructed on discharge instructions, follow       

      up and referral plans. Demonstrated understanding of instructions, follow-up care,          

      medications, Prescriptions given X 2.                                                       

12:42 Patient left the ED.                                                                    ap3 

                                                                                                  

Signatures:                                                                                       

Fabi Lang                             as                                                   

Cj Loco MD MD rn Leal, Jahala, RN RN   [facility]                                                  

Irina Moses RN RN   ap3                                                  

                                                                                                  

**************************************************************************************************

## 2023-02-14 NOTE — EDPHYS
Physician Documentation                                                                           

 [facility]                                                                 

Name: Ashley Ayala                                                                             

Age: 47 yrs                                                                                       

Sex: Female                                                                                       

: 1975                                                                                   

MRN: [id number]                                                                                   

Arrival Date: 2023                                                                          

Time: 10:39                                                                                       

Account#: [id number]                                                                            

Bed DIS1                                                                                          

Private MD:                                                                                       

ED Physician Cj Loco                                                                         

HPI:                                                                                              

                                                                                             

11:01 This 47 yrs old Female presents to ER via Unassigned with complaints of Fall Injury -   rn  

      2.11, Neck and Upper Back Pain, Numbness.                                                   

11:01 Details of fall: The patient fell from an upright position. Onset: The symptoms/episode rn  

      began/occurred 3 day(s) ago. Associated injuries: The patient sustained neck injury.        

      Severity of symptoms: At their worst the symptoms were mild, in the emergency               

      department the symptoms are unchanged. The patient has not experienced similar symptoms     

      in the past. The patient has not recently seen a physician. Pt reports fall from            

      standing, fell backwards and thinks injured neck. Does not recall all events. REports       

      chronic neck pain with herniated discs. Since fall has experienced left neck/arm pain.      

      No LOC. No blood thinners. No other injury. .                                               

                                                                                                  

OB/GYN:                                                                                           

11:28 LMP N/A - Birth control method                                                          jl7 

                                                                                                  

Historical:                                                                                       

- Allergies:                                                                                      

11:28 Codeine;                                                                                jl7 

11:28 Hydrocodone-Acetaminophen;                                                              jl 

11:28 PENICILLINS;                                                                            jl7 

- Home Meds:                                                                                      

11:28 None [Active];                                                                          jl7 

- PMHx:                                                                                           

11:28 Ovarian cysts;                                                                          jl7 

- PSHx:                                                                                           

11:28 Cholecystectomy; tubal pregnancy;                                                       jl7 

                                                                                                  

- Immunization history:: Client reports having NOT received the Covid vaccine.                    

- Social history:: Smoking status: Patient reports the use of cigarette tobacco                   

  products, smokes one-half pack cigarettes per day.                                              

- Family history:: not pertinent.                                                                 

- Hospitalizations: : No recent hospitalization is reported.                                      

                                                                                                  

                                                                                                  

ROS:                                                                                              

11:01 Constitutional: Negative for fever, chills, and weight loss, Eyes: Negative for injury, rn  

      pain, redness, and discharge, Neck: + neck pain and injury Cardiovascular: Negative for     

      chest pain, palpitations, and edema, Respiratory: Negative for shortness of breath,         

      cough, wheezing, and pleuritic chest pain, Abdomen/GI: Negative for abdominal pain,         

      nausea, vomiting, diarrhea, and constipation, Back: Negative for injury and pain,           

      MS/Extremity: Negative for injury and deformity, Skin: Negative for injury, rash, and       

      discoloration, Neuro: Negative for weakness, numbness, tingling, and seizure.               

                                                                                                  

Exam:                                                                                             

11:01 Constitutional:  This is a well developed, well nourished patient who is awake, alert,  rn  

      and in no acute distress.  Ambulatory to triage.  Head/Face:  Normocephalic,                

      atraumatic. Eyes:  Periorbital areas with no swelling, redness, or edema. Neck:  NO         

      midline cervical tenderness Cardiovascular:  Regular rate and rhythm.  No pulse             

      deficits. Respiratory:  No increased work of breathing, no retractions or nasal             

      flaring. Skin:  Warm, dry with normal turgor.  Normal color with no rashes, no lesions,     

      and no evidence of cellulitis. MS/ Extremity:  Pulses equal, no cyanosis.                   

      Neurovascular intact.  Full, normal range of motion.  Equal circumference. Neuro:           

      Awake and alert, GCS 15, oriented to person, place, time, and situation.  Cranial           

      nerves II-XII grossly intact.  Motor strength 5/5 in all extremities.  Sensory grossly      

      intact.  Cerebellar exam normal.  Normal gait.                                              

                                                                                                  

Vital Signs:                                                                                      

11:26  / 87; Pulse 67; Resp 15; Temp 97; Pulse Ox 100% ; Weight 92.99 kg; Height 5 ft.  jl7 

      5 in. (165.10 cm); Pain 8/10;                                                               

11:26 Body Mass Index 34.11 (92.99 kg, 165.10 cm)                                             jl7 

                                                                                                  

MDM:                                                                                              

10:41 Patient medically screened.                                                             rn  

12:29 Differential diagnosis: closed head injury, fracture, sprain, strain, radiculopathy.    rn  

      Data reviewed: vital signs, nurses notes, radiologic studies, CT scan, and as a result,     

      I will discharge patient. Independent interpretation of the following test(s) in the        

      Emergency Department CT Scan: My interpretation is CT head and cspine neg for               

      hemorrhage or fracture. Counseling: I had a detailed discussion with the patient and/or     

      guardian regarding: the historical points, exam findings, and any diagnostic results        

      supporting the discharge/admit diagnosis, radiology results, the need for outpatient        

      follow up, to return to the emergency department if symptoms worsen or persist or if        

      there are any questions or concerns that arise at home. Response to treatment: the          

      patient's symptoms have mildly improved after treatment, and as a result, I will            

      discharge patient. Special discussion: I discussed with the patient/guardian in detail      

      that at this point there is no indication for admission to the hospital. It is              

      understood, however, that if the symptoms persist or worsen the patient needs to return     

      immediately for re-evaluation. Based on the history and exam findings, there is no          

      indication for further emergent testing or inpatient evaluation. I discussed with the       

      patient/guardian the need to see the primary care provider for further evaluation of        

      the symptoms.                                                                               

                                                                                                  

                                                                                             

10:59 Order name: CT Head C Spine                                                             rn  

                                                                                             

11:29 Order name: CT; Complete Time: 12:06                                                    EDMS

                                                                                                  

Administered Medications:                                                                         

No medications were administered                                                                  

                                                                                                  

                                                                                                  

Disposition Summary:                                                                              

23 12:36                                                                                    

Discharge Ordered                                                                                 

      Location: Home                                                                          rn  

      Problem: new                                                                            rn  

      Symptoms: have improved                                                                 rn  

      Condition: Stable                                                                       rn  

      Diagnosis                                                                                   

        - Contusion of unspecified part of neck, initial encounter                            rn  

        - Sprain of ligaments of cervical spine, initial encounter                            rn  

        - Radiculopathy, cervical region                                                      rn  

      Followup:                                                                               rn  

        - With: Private Physician                                                                  

        - When: As needed                                                                          

        - Reason: Recheck today's complaints, Re-evaluation by your physician                      

      Discharge Instructions:                                                                     

        - Discharge Summary Sheet                                                             rn  

        - Cervical Radiculopathy                                                              rn  

        - Neuropathic Pain                                                                    rn  

        - Neck Contusion                                                                      rn  

        - Cervical Sprain                                                                     rn  

      Forms:                                                                                      

        - Medication Reconciliation Form                                                      rn  

        - Thank You Letter                                                                    rn  

        - Antibiotic Education                                                                rn  

        - Prescription Opioid Use                                                             rn  

      Prescriptions:                                                                              

        - Cyclobenzaprine 10 mg Oral Tablet                                                        

            - take 1 tablet by ORAL route every 8 hours As needed; 15 tablet; Refills: 0,     rn  

      Product Selection Permitted                                                                 

        - Medrol (Kushal) 4 mg Oral Tablets, Dose Pack                                                

            - take 1 tablet by ORAL route as directed - follow package instructions; 1        rn  

      packet; Refills: 0, Product Selection Permitted                                             

Signatures:                                                                                       

Dispatcher [employer]                           Cj Lewis MD MD rn Leal, Jahala, RN                        RN   jl7                                                  

                                                                                                  

**************************************************************************************************

## 2023-05-22 ENCOUNTER — HOSPITAL ENCOUNTER (EMERGENCY)
Dept: HOSPITAL 97 - ER | Age: 48
LOS: 1 days | Discharge: HOME | End: 2023-05-23
Payer: SELF-PAY

## 2023-05-22 DIAGNOSIS — K52.9: Primary | ICD-10-CM

## 2023-05-22 LAB
ALBUMIN SERPL BCP-MCNC: 4 G/DL (ref 3.4–5)
ALP SERPL-CCNC: 123 U/L (ref 45–117)
ALT SERPL W P-5'-P-CCNC: 19 U/L (ref 13–56)
AST SERPL W P-5'-P-CCNC: 19 U/L (ref 15–37)
BUN BLD-MCNC: 15 MG/DL (ref 7–18)
GLUCOSE SERPLBLD-MCNC: 106 MG/DL (ref 74–106)
HCT VFR BLD CALC: 40.3 % (ref 36–45)
LIPASE SERPL-CCNC: 25 U/L (ref 13–75)
LYMPHOCYTES # SPEC AUTO: 0.7 K/UL (ref 0.7–4.9)
MCV RBC: 90.7 FL (ref 80–100)
PMV BLD: 8.5 FL (ref 7.6–11.3)
POTASSIUM SERPL-SCNC: 3.8 MEQ/L (ref 3.5–5.1)
RBC # BLD: 4.44 M/UL (ref 3.86–4.86)

## 2023-05-22 PROCEDURE — 99284 EMERGENCY DEPT VISIT MOD MDM: CPT

## 2023-05-22 PROCEDURE — 83690 ASSAY OF LIPASE: CPT

## 2023-05-22 PROCEDURE — 80053 COMPREHEN METABOLIC PANEL: CPT

## 2023-05-22 PROCEDURE — 36415 COLL VENOUS BLD VENIPUNCTURE: CPT

## 2023-05-22 PROCEDURE — 81025 URINE PREGNANCY TEST: CPT

## 2023-05-22 PROCEDURE — 81001 URINALYSIS AUTO W/SCOPE: CPT

## 2023-05-22 PROCEDURE — 85025 COMPLETE CBC W/AUTO DIFF WBC: CPT

## 2023-05-22 PROCEDURE — 74177 CT ABD & PELVIS W/CONTRAST: CPT

## 2023-05-22 NOTE — XMS REPORT
Continuity of Care Document

                             Created on:May 22, 2023



Patient:HOMER KING

Sex:Female

:1975

External Reference #:[id number]





Demographics







                          Address                   [address] 



                                                    [place] [address]

 

                          Home Phone                [phone]

 

                          Mobile Phone              [phone]

 

                          Email Address             [email or web address]

 

                          Preferred Language        English

 

                          Marital Status            Unknown

 

                          Baptism Affiliation     Unknown

 

                          Race                      Unknown

 

                          Additional Race(s)        Unavailable

 

                          Ethnic Group              Unknown









Author







                          Organization              [facility]

t

 

                          Address                   [address] [place] [address]. [address]



                                                    [place] [address]

 

                          Phone                     [phone]









Support







                Name            Relationship    Address         Phone

 

                CHANTALE KING               [address]  [phone]



                                                [place] [address] 

 

                YASMIN KING               Unavailable     [phone]









Care Team Providers







                    Name                Role                Phone

 

                    REA QUINTANILLA Primary Care Physician Unavailable

 

                    GONZÁLEZ BARROW  Attending Clinician Unavailable

 

                    Suzan Tovar LCSW Attending Clinician [phone]

 

                    Ken Padilla MD   Attending Clinician [phone]

 

                    KEN PADILLA      Attending Clinician Unavailable

 

                    Doctor Unassigned, No Name Attending Clinician Unavailable

 

                    Shelley Draper RN     Attending Clinician Unavailable

 

                    CAMILO PITT     Attending Clinician Unavailable

 

                    Yoandy ASHTON MD, Camilo Attending Clinician [phone]

 

                    Giselle Renee Attending Clinician [phone]

 

                    BRIT LANGE Attending Clinician Unavailable

 

                    [facility] Attending Clinician Unavailable

 

                    Honey Moreno Attending Clinician [phone]

 

                    Brit Feliz Attending Clinician [phone]

[phone]

 

                    KEN PADILLA      Admitting Clinician Unavailable









Payers







           Payer Name Policy Type Policy Number Effective Date Expiration Date S

ource

 

           [facility]            [id number]                        







Problems







       Condition Condition Condition Status Onset  Resolution Last   Treating Co

mments 

Source



       Name   Details Category        Date   Date   Treatment Clinician        



                                                 Date                 

 

       Cervical Cervical Disease Active                              Unive

rs



       stenosis stenosis               2-16                               ity of



       of spine of spine               00:00:                             [address]



                                                                      Branch

 

       Abnormalit Abnormalit Disease Active 2019-0                             U

nivers



       y of right y of right                                              it

y of



       breast on breast on               00:00:                             [place]

[place]



       screening screening               00                                 Medi

allyson



       mammogram mammogram                                                  Bran

ch

 

       Right  Right  Disease Active                       Overview: Univer

s



       ovarian ovarian                                       Formattin ity o

f



       cyst   cyst                 00:00:                      g of this [place]



                                   00                          note   Medical



                                                               might be Branch



                                                               different 



                                                               from the 



                                                               original. 



                                                               1.8 x 1.8 



                                                               x 2.1 cm 



                                                               right  



                                                               ovarian 



                                                               hemorrhag 



                                                               ic cyst 



                                                               on 19 



                                                               pelvic US 

 

       Intramural Intramural Disease Active                       Overview

: Univers



       leiomyoma leiomyoma                                       Formattin i

ty of



       of uterus of uterus               00:00:                      g of this [place]

[place]



                                   00                          note   Medical



                                                               might be Branch



                                                               different 



                                                               from the 



                                                               original. 



                                                               1 x 0.8 



                                                               cm on  



                                                               19 



                                                               pelvic US 

 

       Screen for Screen for Disease Active                              U

nivers



       STD    STD                  -29                               ity of



       (sexually (sexually               00:00:                             [place]

[place]



       transmitte transmitte               00                                 Me

dical



       d disease) d disease)                                                  Br

anch

 

       BMI    BMI    Disease Active -                             Univers



       31.0-31.9, 31.0-31.9,               5-29                               it

y of



       adult  adult                00:00:                             [place]



                                   00                                 Medical



                                                                      Branch

 

       Positive Positive Disease Active                              Unive

rs



       depression depression               -                               it

y of



       screening screening               00:00:                             [place]

[place]



                                                                    Medical



                                                                      Branch

 

       S/P tubal S/P tubal Disease Active                              Uni

vers



       ligation ligation               -                               ity of



                                   00:00:                             [place]



                                   00                                 Medical



                                                                      Branch

 

       Trichomona Trichomona Disease Active                              U

nivers



       l      l                    -                               ity of



       vulvovagin vulvovagin               00:00:                             [place]

[place]



       itis   itis                 00                                 Medical



                                                                      Branch

 

       Menorrhagi Menorrhagi Disease Active                              U

nivers



       a with a with               5-29                               ity of



       regular regular               00:00:                             [place]



       cycle  cycle                                                 Medical



                                                                      Branch

 

       Enlarged Enlarged Disease Active                              Unive

rs



       uterus uterus               -                               ity of



                                   00:00:                             [place]



                                   00                                 Medical



                                                                      Branch

 

       S/P tubal S/P tubal Disease Active                              Uni

vers



       ligation ligation               -29                               ity of



                                   00:00:                             [place]



                                   00                                 Medical



                                                                      Branch

 

       Menorrhagi Menorrhagi Disease Active 2019-                             U

nivers



       a with a with               5-29                               ity of



       regular regular               00:00:                             [place]



       cycle  cycle                                                 Medical



                                                                      Branch







Allergies, Adverse Reactions, Alerts







       Allergy Allergy Status Severity Reaction(s) Onset  Inactive Treating Comm

ents 

Source



       Name   Type                        Date   Date   Clinician        

 

       Penicill Drug   Active        Hives                        Univers



       in     Allergy                                              ity of



                                          00:00:                      [place]



                                          00                          Medical



                                                                      Branch

 

       PENICILL DRUG   Active Med    Hives                        Univers



       IN     INGREDI                                              ity of



                                          00:00:                      [place]



                                          00                          Medical



                                                                      Branch

 

       Penicill Drug   Active        Hives                        Univers



       in     Allergy                                              ity of



                                          00:00:                      [place]



                                          00                          Medical



                                                                      Branch

 

       Amoxicil Propensi Active        Hives                        Univer

s



       frandy    ty to                                               ity of



              adverse                      00:00:                      [place]



              reaction                      00                          Medical



              s                                                       Branch

 

       AMOXICIL DRUG   Active        Hives                        Univers



       FRANDY    INGREDI                                              ity of



                                          00:00:                      [place]



                                          00                          Medical



                                                                      Branch

 

       Hydrocod Propensi Active        Nausea 2016                      Univer

s



       one-Acet ty to                and/or 2-                        ity of



       aminophe adverse               Vomiting 00:00:                      [place]



       n      reaction                      00                          Medical



              s                                                       Branch

 

       HYDROCOD DRUG   Active        N/V    2016                      Univers



       ONE-ACET                             2-21                        ity of



       AMINOPHE                             00:00:                      [place]



       N                                  00                          Medical



                                                                      Branch

 

       Codeine Propensi Active        Rash   -0                      Univers



              ty to                                               ity of



              adverse                      00:00:                      [place]



              reaction                      00                          Medical



              s                                                       Branch

 

       CODEINE DRUG   Active        Rash   -0                      Univers



              INGREDI                                              ity of



                                          00:00:                      [place]



                                          00                          Medical



                                                                      Branch







Social History







           Social Habit Start Date Stop Date  Quantity   Comments   Source

 

           History of tobacco 1999            Cigarette Smoker            

University of



           use        00:00:00                                    [facility]

 

           History On license of [facility] o

f



           Al[facility]                                             [facility]



                                                                  [facility]

 

           History [facility]                                             University o

f



           Alcohol Std Drinks                                             [facility]

 

           History [facility]                                             University o

f



           Alcohol Binge                                             [place] [facility]

al



                                                                  Branch

 

           Exposure to 2023 Not sure              University of



           SARS-CoV-2 (event) 00:00:00   09:01:00                         [facility]

 

           Alcohol intake 2023 Current drinker            Unive

rsity of



                      00:00:00   00:00:00   of alcohol            [facility] [place]



                                            (finding)             Branch

 

           Cigarettes smoked 2019                       Univers

ity of



           current (pack per 00:00:00   00:00:00                         [facility]

edical



           day) - Reported                                             Branch

 

           Alcohol Comment 2019 ocassional            Universit

y of



                      00:00:00   00:00:00                         [facility]

 

           Tobacco use and 2019 Smokeless tobacco            Un

iversity of



           exposure   00:00:00   00:00:00   non-user              [facility]

 

           Sex Assigned At 1975                       Universit

y of



           Birth      00:00:00   00:00:00                         [facility]









                Smoking Status  Start Date      Stop Date       Source

 

                Smokes tobacco daily 2019 00:00:00                 Univers

ity of [facility]







Medications







       Ordered Filled Start  Stop   Current Ordering Indication Dosage Frequency

 Signature

                    Comments            Components          Source



     Medication Medication Date Date Medication? Clinician                (SIG) 

          



     Name Name                                                   

 

     gabapentin            Yes       [id number] 300mg      Take 1           

Univers



     300 mg      3-07                               capsule by           ity of



     capsule      00:00:                               mouth 3           [place]



               00                                 (three)           Medical



                                                  times           [place]



                                                  daily as           



                                                  needed for           



                                                  Pain           



                                                  (scale           



                                                  7-10).           

 

     gabapentin            Yes       [id number] 300mg      Take 1           

Univers



     300 mg      3-07                               capsule by           ity of



     capsule      00:00:                               mouth 3           [place]



               00                                 (three)           Medical



                                                  times           [place]



                                                  daily as           



                                                  needed for           



                                                  Pain           



                                                  (scale           



                                                  7-10).           

 

     FENTanyl PF       No             25ug      25 mcg,           Un

jeramy



     (SUBLIMAZE                                Slow IV           ity o

f



     (PF))      17:30: 16:24                          Push,           [place]



     injection      00   :00                           ONCE, 1           Medical



     25 mcg                                         dose, On           [facility]            



                                                  22 at           



                                                  1130,           



                                                  Routine           

 

     famotidine            Yes            20mg      20 mg,           Unive

rs



     (PEPCID AC)      2-17                               Oral, BID,           it

y of



     tablet 20      14:00:                               First dose           Te

[place]



     mg        00                                 on [facility]



                                                  22 at           [place]



                                                  0800,           



                                                  Until           



                                                  Discontinu           



                                                  ed,            



                                                  Routine           

 

     docusate            Yes            100mg      100 mg,           Unive

rs



     (COLACE)      2-17                               Oral, BID,           ity o

f



     capsule 100      14:00:                               First dose           

[place]



     mg        00                                 on [facility]



                                                  22 at           [place]



                                                  0800,           



                                                  Until           



                                                  Discontinu           



                                                  ed,            



                                                  Routine           

 

     gabapentin      -0      Yes            300mg      300 mg,           Uni

vers



     (NEURONTIN)      2-17                               Oral, TID,           it

y of



     capsule 300      14:00:                               First dose           

[place]



     mg        00                                 on [facility]



                                                  22 at           [place]



                                                  0800,           



                                                  Until           



                                                  Discontinu           



                                                  ed,            



                                                  Routine           

 

     traMADoL      0      Yes            50mg      50 mg,           Univers



     (ULTRAM)                                     Oral,           ity of



     tablet 50      03:35:                               Q6HPRN,           [place]



     mg        17                                 Starting           Medical



                                                  on Wed           Branch



                                                  22 at           



                                                  2135,           



                                                  Until           



                                                  Discontinu           



                                                  ed,            



                                                  Routine,           



                                                  Pain           



                                                  (scale           



                                                  4-6)           

 

     acetaminoph      -0      Yes            325mg      325 mg,           Un

jeramy



     en                                       Oral,           ity of



     (TYLENOL)      03:35:                               Q6HPRN,           [place]



     tablet 325      11                                 Starting           Medic

al



     mg                                           on Wed           Branch



                                                  22 at           



                                                  2135,           



                                                  Until           



                                                  Discontinu           



                                                  ed,            



                                                  Routine,           



                                                  Pain           



                                                  (scale           



                                                  1-3)           

 

     bisacodyL      -0      Yes            10mg      10 mg,           Univer

s



     (DULCOLAX)                                     Rectal,           ity of



     suppository      03:32:                               QHSPRN,           [place]

as



     10 mg      01                                 Starting           Medical



                                                  on Wed           Branch



                                                  22 at           



                                                  2132,           



                                                  Until           



                                                  Discontinu           



                                                  ed,            



                                                  Routine,           



                                                  Constipati           



                                                  on             

 

     NaCl 0.9%      0      Yes            5mL       5 mL, Slow           Un

jeramy



     (NS)                                     IV Push,           ity of



     injection 5      03:32:                               PRN - SEE           T

[place]



     mL        01                                 INSTRUCT           Medical



                                                  NS,            Branch



                                                  Starting           



                                                  on 22 at           



                                                  2132,           



                                                  Until           



                                                  Discontinu           



                                                  ed, 10 mL           

 

     methylPREDN      -0      Yes       [id number]           Take by          

 [facility]



     ISolone 4      2-17                               mouth           ity of



     mg tablets      00:00:                               SEE-INSTRU           T

[place]



               00                                 CTIONS.           Medical



                                                  follow           Branch



                                                  package           



                                                  directions           

 

     methylPREDN      2-0      Yes       [id number]           Take by          

 [facility]



     ISolone 4      2-17                               mouth           ity of



     mg tablets      00:00:                               SEE-INSTRU           T

[place]



               00                                 CTIONS.           Medical



                                                  follow           Branch



                                                  package           



                                                  directions           

 

     methylPREDN      2-0      Yes       [id number]           Take by          

 [facility]



     ISolone 4      2-17                               mouth           ity of



     mg tablets      00:00:                               SEE-INSTRU           T

[place]



               00                                 CTIONS.           Medical



                                                  follow           Branch



                                                  package           



                                                  directions           

 

     methylPREDN      2022-0      Yes       [id number]           Take by          

 [facility]



     ISolone 4      2-17                               mouth           ity of



     mg tablets      00:00:                               SEE-INSTRU           T

[place]



               00                                 CTIONS.           Medical



                                                  follow           Branch



                                                  package           



                                                  directions           

 

     methylPREDN      2022-0      Yes       [id number]           Take by          

 [facility]



     ISolone 4      2-17                               mouth           ity of



     mg tablets      00:00:                               SEE-INSTRU           T

[place]



               00                                 CTIONS.           Medical



                                                  follow           Branch



                                                  package           



                                                  directions           

 

     methylPREDN      2022-0      Yes       [id number]           Take by          

 [facility]



     ISolone 4      2-17                               mouth           ity of



     mg tablets      00:00:                               SEE-INSTRU           T

[place]



               00                                 CTIONS.           Medical



                                                  follow           Branch



                                                  package           



                                                  directions           

 

     methylPREDN      2-0      Yes       [id number]           Take by          

 [facility]



     ISolone 4      2-17                               mouth           ity of



     mg tablets      00:00:                               SEE-INSTRU           T

[place]



               00                                 CTIONS.           Medical



                                                  follow           Branch



                                                  package           



                                                  directions           

 

     methylPREDN      2-0      Yes       [id number]           Take by          

 [facility]



     ISolone 4      2-17                               mouth           ity of



     mg tablets      00:00:                               SEE-INSTRU           T

[place]



               00                                 CTIONS.           Medical



                                                  follow           Branch



                                                  package           



                                                  directions           

 

     methylPREDN      2-0      Yes       [id number]           Take by          

 [facility]



     ISolone 4      2-17                               mouth           ity of



     mg tablets      00:00:                               SEE-INSTRU           T

[place]



               00                                 CTIONS.           Medical



                                                  follow           Branch



                                                  package           



                                                  directions           

 

     methylPREDN      2-0      Yes       [id number]           Take  by         

  [facility]



     ISolone 4      2-17                               mouth           ity of



     mg tablets      00:00:                               SEE-INSTRU           T

[place]



               00                                 CTIONS.           Medical



                                                  follow           Branch



                                                  package           



                                                  directions           

 

     methylPREDN      2-0      Yes       [id number]           Take by          

 [facility]



     ISolone 4      2-17                               mouth           ity of



     mg tablets      00:00:                               SEE-INSTRU           T

[place]



               00                                 CTIONS.           Medical



                                                  follow           Branch



                                                  package           



                                                  directions           

 

     methylPREDN      2-0      Yes       [id number]           Take by          

 [facility]



     ISolone 4      2-17                               mouth           ity of



     mg tablets      00:00:                               SEE-INSTRU           T

[place]



               00                                 CTIONS.           Medical



                                                  follow           Branch



                                                  package           



                                                  directions           

 

     gabapentin      -0 202- No        [id number] 300mg      Take 1          

 [facility]



     300 mg      2-17 03-20                          capsule by           ity of



     capsule      00:00: 04:59                          mouth 3           [place]



               00   :00                           (three)           Medical



                                                  times           Branch



                                                  daily for           



                                                  30 days.           

 

     gabapentin      -0 202- No        [id number] 300mg      Take 1          

 [facility]



     300 mg      2-17 03-20                          capsule by           ity of



     capsule      00:00: 04:59                          mouth 3           [place]



               00   :00                           (three)           Medical



                                                  times           Branch



                                                  daily for           



                                                  30 days.           

 

     gabapentin      -0 - No        [id number] 300mg      Take 1          

 [facility]



     300 mg      2-17 03-20                          capsule by           ity of



     capsule      00:00: 04:59                          mouth 3           [place]



               00   :00                           (three)           Medical



                                                  times           Branch



                                                  daily for           



                                                  30 days.           

 

     LOESTRIN       2019- No        [id number] 1{tbl}      Take 1       

    [facility]



     (MICROGESTI      5-29 07-29                          tablet by           it

y of



     N FE )      00:00: 00:00                          mouth           [place]



     1 mg-20 mcg      00   :00                           daily.           Medica

l



     (21)/75 mg                                                        Branch



     (7) tablet                                                        

 

     LOESTRIN       2019- No        [id number] 1{tbl}      Take 1       

    [facility]



     (MICROGESTI      5-29 07-29                          tablet by           it

y of



     N FE )      00:00: 00:00                          mouth           [place]



     1 mg-20 mcg      00   :00                           daily.           Medica

l



     (21)/75 mg                                                        Branch



     (7) tablet                                                        

 

     LOESTRIN       2019- No        [id number] 1{tbl}      Take 1       

    [facility]



     (MICROGESTI      5-29 07-29                          tablet by           it

y of



     N FE )      00:00: 00:00                          mouth           [place]



     1 mg-20 mcg      00   :00                           daily.           Medica

l



     (21)/75 mg                                                        Branch



     (7) tablet                                                        

 

     LOESTRIN       2019- No        [id number] 1{tbl}      Take 1       

    [facility]



     (MICROGESTI      5-29 07-29                          tablet by           it

y of



     N FE )      00:00: 00:00                          mouth           [place]



     1 mg-20 mcg      00   :00                           daily.           Medica

l



     (21)/75 mg                                                        Branch



     (7) tablet                                                        

 

     No known                No                                      Univers



     medications                                                        [facility]

 

     No known                No                                      [facility]



     medications                                                        [facility]

 

     No known                No                                      Univers



     medications                                                        [facility]

 

     No known                No                                      [facility]



     medications                                                        [facility]







Vital Signs







             Vital Name   Observation Time Observation Value Comments     Source

 

             Body height  2023 15:15:00 165.1 cm                  [facility]

 

             Body weight  2023 15:15:00 92.987 kg                 [facility]

 

             BMI          2023 15:15:00 34.11 kg/m2               [facility]

 

             Systolic blood 2022 18:00:00 107 mm[Hg]                Univer

sity [facility]

 

             Diastolic blood 2022 18:00:00 58 mm[Hg]                 Unive

rsity of



             [facility]

 

             Heart rate   2022 18:00:00 61 /min                   Universi

ty of



                                                                 [place] [facility]



                                                                 Branch

 

             Body temperature 2022 18:00:00 36.44 Faith                 Univ

ersity of



                                                                 [place] [facility]



                                                                 Branch

 

             Respiratory rate 2022 18:00:00 20 /min                   Univ

ersity of



                                                                 [place] [facility]



                                                                 Branch

 

             Oxygen saturation in 2022 18:00:00 100 /min                  

[facility] 



             Arterial blood by                                        [place] [facility]

[facility]



             Pulse oximetry                                        Branch

 

             Body height  2022 03:37:00 165.1 cm                  Universi

ty of



                                                                 [place] [facility]



                                                                 Branch

 

             Body weight  2022 01:00:00 86.183 kg                 Universi

ty of



                                                                 [facility]



                                                                 Branch

 

             BMI          2022 01:00:00 31.62 kg/m2               Universi

ty of



                                                                 [place] [facility]



                                                                 Branch

 

             Systolic blood 2019 20:12:00 129 mm[Hg]                Univer

sity of



             pressure                                            [facility]



                                                                 Branch

 

             Diastolic blood 2019 20:12:00 74 mm[Hg]                 Unive

rsity of



             pressure                                            [facility]



                                                                 Branch

 

             Heart rate   2019 20:12:00 57 /min                   Universi

ty of



                                                                 [place] [facility]



                                                                 Branch

 

             Body temperature 2019 20:12:00 36.11 Faith                 Univ

ersity of



                                                                 [facility]



                                                                 Branch

 

             Respiratory rate 2019 20:12:00 16 /min                   Univ

ersity of



                                                                 [place] [facility]



                                                                 Branch

 

             Body height  2019 20:12:00 165.1 cm                  Universi

ty of



                                                                 [place] [facility]



                                                                 Branch

 

             Body weight  2019 20:12:00 85.333 kg                 Universi

ty of



                                                                 [place] [facility]



                                                                 Branch

 

             BMI          2019 20:12:00 31.31 kg/m2               Universi

ty of



                                                                 [place] [facility]



                                                                 Branch

 

             Systolic blood 2019 20:37:00 116 mm[Hg]                Univer

sity of



             pressure                                            [place] [facility]



                                                                 Branch

 

             Diastolic blood 2019 20:37:00 80 mm[Hg]                 Unive

rsity of



             pressure                                            [facility]



                                                                 Branch

 

             Body temperature 2019 20:32:00 36.44 Faith                 Univ

ersity of



                                                                 [place] [facility]



                                                                 Branch

 

             Respiratory rate 2019 20:32:00 16 /min                   Univ

ersity of



                                                                 [place] [facility]



                                                                 Branch

 

             Body height  2019 20:32:00 165.1 cm                  Universi

ty of



                                                                 [place] [facility]



                                                                 Branch

 

             Body weight  2019 20:32:00 85.276 kg                 Universi

ty of



                                                                 [place] [facility]



                                                                 Branch

 

             BMI          2019 20:32:00 31.28 kg/m2               [facility]







Procedures







                Procedure       Date / Time     Performing Clinician Source



                                Performed                       

 

                CONSENT/REFUSAL FOR 2023 15:02:05 Doctor Unassigned, No [facility]



                [facility]NOS[facility] TREATMENT                 Name            [facility]

 

                AUTHORIZATION FOR 2022 05:01:00 Doctor Unassigned, No [facility]



                RELEASE OF PHI                  Name            [facility]

 

                EXTERNAL PROVIDER 2022 06:01:00 Doctor Unassigned, No [facility]



                RECORDS                         Name            [facility]

 

                ABORH CONFIRMATION (LAB 2022 11:40:00 Bhanu Melvin 

[facility]



                ONLY)                                           [facility]

 

                HB ABO GROUPING 2022 11:06:00 Bhanu Melvin [facility]

 

                BASIC METABOLIC PANEL 2022 11:05:00 Bhanu Melvin

[facility]



                (NA, K, CL, CO2,                                 [facility]



                GLUCOSE, BUN,                                   



                CREATININE, CA)                                 

 

                CBC WITH DIFF   2022 11:05:00 Bhanu Melvin [facility]

 

                PROTHROMBIN TIME / INR 2022 11:05:00 [facility]

 

                ACTIVATED PARTIAL 2022 11:05:00 Bhanu Melvin [facility]







Encounters







        Start   End     Encounter Admission Attending Care    Care    Encounter 

Source



        Date/Time Date/Time Type    Type    Clinicians Facility Department ID   

   

 

        2023 Outpatient BAHMAN BARROW [facility]    [id number]

[id number] Univers



        00:00:00 00:00:00                 GONZÁLEZ hoover [facility]

 

        2023 Patient         Guy [facility]    [id number]

[id number] Univers



        00:00:00 00:00:00 Outreach         [place]  [id number]         i

ty of



                                                CLEAR   [id number]         [place]

s



                                                ALDANA    [phone]         [address]



                                                OFFICE                  



                                                BUILDING                 

 

        2023 Telephone         Maximino  [facility]    [id number]

[id number] Univers



        00:00:00 00:00:00                 Ken [employer]  [id number]         it

y of



                                                [place]AR   [id number]         [place]

[place]



                                                ALDANA    [phone]         [address]



                                                OFFICE                  



                                                BUILDING                 

 

        2023 Office          ELIE Padilla    [id number]

[id number] Univers



        09:20:00 09:40:00 Visit           Ken SOTELO  [id number]         it

y of



                                                CLEAR   [id number]         [place]

[place]



                                                ALDANA    [phone]         [address]



                                                OFFICE                  



                                                BUILDING                 

 

        2023 Outpatient R       KEN PADILLA [facility]    

[id number] Univers



        09:20:00 09:20:00                 KEN PADILLA [facility]

 

        2023 Orders          Doctor  KADEN    [id number]

[id number] Univers



        00:00:00 00:00:00 Only            Unassigned, KIT   [id number]       

  ity of



                                        [facility] [id number]         [place]

as



                                                        [phone]         Medi

[place]son



                                                        009             Branch

 

        2023 Nurse           KADEN Draper    [id number]

[id number] Univers



        00:00:00 00:00:00 Triage          Shelley   KIT   [id number]         it

y of



                                                HOSPITAL [id number]         [place]

as



                                                        [phone]         [address]

 

        2023 Telephone         Maximino  [facility]    [id number]

[id number] Univers



        00:00:00 00:00:00                 Ken [facility]  [id number]         it

y of



                                                CLEAR   [id number]         [place]

[place]



                                                ALDANA    [phone]         [address]



                                                OFFICE                  



                                                BUILDING                 

 

        2022 Outpatient KEN ESCALANTE [facility]    

[id number] Univers



        11:00:00 11:00:00                 KEN PADILLA [facility]

 

        2022 Outpatient KEN ESCALANTE [facility]    

[id number] Univers



        15:00:00 15:00:00                 KEN PADILLA [facility]

 

        202212 Outpatient R       KEN PADILLA [facility]    

[id number] Univers



        14:30:00 14:30:00                 KEN PADILLA [facility]

 

        2022 Outpatient R       YOANDY  [facility]    [id number]

[id number] Univers



        11:01:18 23:59:00                 [facility]                         ity o

f



                                                                        [facility]

 

        2022 [facility]         [facility]    [id number]

[id number] Univers



        11:01:18 23:59:00 Encounter         [facility]  [id number]        

 ity of



                                                CLEAR   [id number]         [place]

[place]



                                                [place]    [phone]         [address]



                                                OFFICE                  



                                                BUILDING                 

 

        2022 Orders          Doctor  KADEN    1.2.840.114 [id number]

73 [facility]



        00:00:00 00:00:00 Only            Unassigned, KIT   350.1.13.10       

  ity of



                                        [facility] 4.2.7.2.686         [place]

as



                                                        [phone]         [address]

 

        2022 Orders          Doctor  KADEN    1.2.840.114 [id number]

99 [facility]



        00:00:00 00:00:00 Only            Unassigned, KIT   350.1.13.10       

  ity of



                                        [facility] 4.2.7.2.686         [place]

as



                                                        [phone]         [address]

 

        2022 Outpatient U       KEN PADILLA [facility]     

[id number] Univers



        20:51:00 14:00:00                 KEN PADILLA                        

 kiko [facility]

 

        2022 [facility]         Maximino  HARVEY  1.2.840.114 [id number]

419 [facility]



        20:51:00 14:00:00 Encounter         Ken PANTOJA   350.1.13.10         

ity of



                                                HOSPITAL 4.2.7.2.686         [place]

as



                                                        [phone]         Medi

allyson



                                                        097             Branch

 

        2021-03-15 2021-03-15 Outpatient R               [facility]    [id number]

116 [facility]



        16:00:00 16:00:00                                                 ity of



                                                                        [facility]

 

        2021 [facility]phone         [facility]    1.2.840.114 [id number]

[id number] Univers



        00:00:00 00:00:00                 Giselle DOAN OB/GYN  350.1.13.10         it

y of



                                                REGIONAL 4.2.7.2.686         [place]

as



                                                MATERNAL [phone]         Med

ical



                                                & CHILD [address] [place] [address]                 

 

        2021 Outpatient R       HOSSEIN [facility]    [id number]

[id number] Univers



        08:45:00 08:45:00                 BRIT chavez



                                                                        [facility]

 

        2020 Telephone         [facility]    1.2.840.114 [id number]

[id number] Univers



        00:00:00 00:00:00                 Giselle DOAN OB/GYN  350.1.13.10         it

y of



                                                REGIONAL 4.2.7.2.686         [place]

as



                                                MATERNAL [phone]         Med

ical



                                                & CHILD [address] [place] [address]                 

 

        2019 [facility]           [facility]    1.2

.840.114 [id number] 

[facility]



        14:57:22 15:24:24 Visit           Honey Chavira OB/GYN  350.1.13.10

         ity of



                                                REGIONAL 4.2.7.2.686         [place]

as



                                                MATERNAL [phone]         Med

ical



                                                & CHILD [address] [place] [address]                 

 

        2019 Nurse           Visit, Alejoaudrey Nurse [facility]    1.2

.840.114 [id number] 

[facility]



        16:05:21 16:13:38 Visit           Brit Lange OB/GYN  350.1.13.

10         ity of



                                                REGIONAL 42.7.2.686         [place]

as



                                                MATERNAL [phone]         Med

ical



                                                & CHILD [address] [place] [address]                 

 

        2019 Office          Fan [facility]    1.2.840.114 [id number]

[address] [place] [address]



        14:48:45 16:29:10 Visit           Honey RUGGIERO OB/GYN  350.1.13.10        

 ity of



                                                REGIONAL 4.2.7.2.686         [place]

as



                                                MATERNAL [phone]         Med

ical



                                                & CHILD [address] [place] [address]                 







Results







           Test Description Test Time  Test Comments Results    Result Comments 

Source









                    ABORH Confirmation (Lab Only) 2022 13:30:16 









                      Test Item  Value      Reference Range Interpretation Comme

nts









             ABO & RH (test code = 20) A Positive                             Pe

rformed at [facility] Services - 

[facility]



                                                                 Blood [address]

[place], [place] [address]

 Free: [phone] No.



                                                                 [id number]



[facility] (NA, K, CL, CO2, 
GLUCOSE, BUN, CREATININE, CA)2022 12:14:34





             Test Item    Value        Reference Range Interpretation Comments

 

             NA (test code = 135 mmol/L   135-145                   



             7061637491)                                         

 

             K (test code = 4.3 mmol/L   3.5-5.0                   



             2935576018)                                         

 

             CL (test code = 108 mmol/L                       



             2449923921)                                         

 

             CO2 TOTAL (test code = 22 mmol/L    23-31        L            



             1157632466)                                         

 

             AGAP (test code =              2-16                      



             3105829579)                                         

 

             BUN (test code = 12 mg/dL     7-23                      



             2749950624)                                         

 

             GLUCOSE (test code = 131 mg/dL           H            



             8523443360)                                         

 

             CREATININE (test code = 0.70 mg/dL   0.50-1.04                 



             9227425154)                                         

 

             CALCIUM (test code = 8.3 mg/dL    8.6-10.6     L            



             2282840089)                                         

 

             eGFR (test code =              mL/min/1.73m2              



             4068056646)                                         

 

             KATTY (test code = KATTY) Association of                           



                          Glomerular Filtration                           



                          Rate (GFR) and Staging                           



                          of Kidney Disease*                           



                          +---------------------                           



                          --+-------------------                           



                          --+-------------------                           



                          ------+| GFR                           



                          (mL/min/1.73 m2) ?|                           



                          With Kidney Damage ?|                           



                          ?Without Kidney                           



                          Damage+---------------                           



                          --------+-------------                           



                          --------+-------------                           



                          ------------+| ?>90 ?                           



                          ? ? ? ? ? ? ? ?|                           



                          ?Stage one ? ? ? ? ?|                           



                          ? Normal ? ? ? ? ? ? ?                           



                          ?+--------------------                           



                          ---+------------------                           



                          ---+------------------                           



                          -------+| ?60-89 ? ? ?                           



                          ? ? ? ? ?| ?Stage two                           



                          ? ? ? ? ?| ? Decreased                           



                          GFR ? ? ? ?                            



                          +---------------------                           



                          --+-------------------                           



                          --+-------------------                           



                          ------+| ?30-59 ? ? ?                           



                          ? ? ? ? ?| ?Stage                           



                          three ? ? ? ?| ? Stage                           



                          three ? ? ? ? ?                           



                          +---------------------                           



                          --+-------------------                           



                          --+-------------------                           



                          ------+| ?15-29 ? ? ?                           



                          ? ? ? ? ?| ?Stage four                           



                          ? ? ? ? | ? Stage four                           



                          ? ? ? ? ?                              



                          ?+--------------------                           



                          ---+------------------                           



                          ---+------------------                           



                          -------+| ?<15 (or                           



                          dialysis) ? ?| ?Stage                           



                          five ? ? ? ? | ? Stage                           



                          five ? ? ? ? ?                           



                          ?+--------------------                           



                          ---+------------------                           



                          ---+------------------                           



                          -------+ *Each stage                           



                          assumes the associated                           



                          GFR level has been in                           



                          effect for at least                           



                          three months. ?Stages                           



                          1 to 5, with or                           



                          without kidney                           



                          disease, indicate                           



                          chronic kidney                           



                          disease. Notes:                           



                          Determination of                           



                          stages one and two                           



                          (with eGFR                             



                          >59mL/min/1.73 m2)                           



                          requires estimation of                           



                          kidney damage for at                           



                          least three months as                           



                          defined by structural                           



                          or functional                           



                          abnormalities of the                           



                          kidney, manifested by                           



                          either:Pathological                           



                          abnormalities or                           



                          Markers of kidney                           



                          damage (including                           



                          abnormalities in the                           



                          composition of the                           



                          blood or urine or                           



                          abnormalities in                           



                          imaging tests).                           

 

             Lab Interpretation Abnormal                               



             (test code = 06173-5)                                        



[facility] ON[facility] [id number] 
11:55:01





             Test Item    Value        Reference Range Interpretation Comments

 

             ABO & RH (test code A POSITIVE                             Performe

d at [facility]



             = 20)                                               Laboratory Serv

ices -



                                                                 [facility]

01



                                                                 [facility]

s



                                                                 [id number] Free:



                                                                 [phone]

A No.



                                                                 [id number]

 

             IAT (test code = Negative                               Performed a

t [facility]



             1185)                                               Laboratory Serv

ices -



                                                                 GAL [facility]

01



                                                                 [facility]

s



                                                                 [address] Free:



                                                                 [phone]

A No.



                                                                 [id number]



[facility] / [id number] 11:29:46





             Test Item    Value        Reference Range Interpretation Comments

 

             PROTIME PATIENT (test              See_Comment                [Auto

mated message]



             code = 5964-2)                                        The system 

[employer]



                                                                 generated this 

result



                                                                 transmitted ref

erence



                                                                 range: 10.1 - 1

2.6



                                                                 Seconds. The re

ference



                                                                 range was not u

sed to



                                                                 interpret this 

result



                                                                 as normal/abnor

mal.

 

             INR (test code = 6301-6)                                        Nor

mal INR <1.1;



                                                                 Warfarin Therap

eutic



                                                                 range 2.0 to 3.

0 or



                                                                 2.5 to 3.5, dep

ending



                                                                 upon the indica

tions.

 

             Lab Interpretation (test Normal                                 



             code = 89427-8)                                        



[facility] THRMPLAS [id number] 
11:29:46





             Test Item    Value        Reference Range Interpretation Comments

 

             APTT Patient (test code              See_Comment  L             [Au

tomated message]



             = 3173-2)                                           The system whic

h



                                                                 generated this 

result



                                                                 transmitted ref

erence



                                                                 range: 26 - 36



                                                                 Seconds. The



                                                                 reference range

 was



                                                                 not used to int

erpret



                                                                 this result as



                                                                 normal/abnormal

.

 

             Lab Interpretation (test Abnormal                               



             code = 08444-8)                                        



[facility] WITH [id number] 11:22:42





             Test Item    Value        Reference Range Interpretation Comments

 

             WBC (test code =              See_Comment                [Automated



             7490-2)                                             message] The sy

stem



                                                                 which generated



                                                                 this result



                                                                 transmitted



                                                                 reference range

:



                                                                 4.30 - 11.10



                                                                 10*3/?L. The



                                                                 reference range

 was



                                                                 not used to



                                                                 interpret this



                                                                 result as



                                                                 normal/abnormal

.

 

             RBC (test code =              See_Comment  L             [Automated



             789-8)                                              message] The sy

stem



                                                                 which generated



                                                                 this result



                                                                 transmitted



                                                                 reference range

:



                                                                 3.93 - 5.25



                                                                 10*6/?L. The



                                                                 reference range

 was



                                                                 not used to



                                                                 interpret this



                                                                 result as



                                                                 normal/abnormal

.

 

             HGB (test code = 11.6 g/dL    11.6-15.0                 



             718-7)                                              

 

             HCT (test code = 35.2 %       35.7-45.2    L            



             4544-3)                                             

 

             MCV (test code = 92.6 fL      80.6-95.5                 



             787-2)                                              

 

             MCH (test code = 30.5 pg      25.9-32.8                 



             785-6)                                              

 

             MCHC (test code = 33.0 g/dL    31.6-35.1                 



             786-4)                                              

 

             RDW-SD (test code = 47.0 fL      39.0-49.9                 



             99871-1)                                            

 

             RDW-CV (test code = 13.8 %       12.0-15.5                 



             788-0)                                              

 

             PLT (test code =              See_Comment                [Automated



             777-3)                                              message] The sy

stem



                                                                 which generated



                                                                 this result



                                                                 transmitted



                                                                 reference range

:



                                                                 166 - 358 10*3/

?L.



                                                                 The reference r

raine



                                                                 was not used to



                                                                 interpret this



                                                                 result as



                                                                 normal/abnormal

.

 

             MPV (test code = 10.8 fL      9.5-12.9                  



             53905-9)                                            

 

             NRBC/100 WBC (test              See_Comment                [Automat

ed



             code = 7537480583)                                        message] 

The system



                                                                 which generated



                                                                 this result



                                                                 transmitted



                                                                 reference range

:



                                                                 0.0 - 10.0 /100



                                                                 WBCs. The refer

ence



                                                                 range was not u

sed



                                                                 to interpret th

is



                                                                 result as



                                                                 normal/abnormal

.

 

             NRBC x10^3 (test code <0.01        See_Comment                [Auto

mated



             = [id number])                                        message] The s

ystem



                                                                 which generated



                                                                 this result



                                                                 transmitted



                                                                 reference range

:



                                                                 10*3/?L. The



                                                                 reference range

 was



                                                                 not used to



                                                                 interpret this



                                                                 result as



                                                                 normal/abnormal

.

 

             GRAN MAT (NEUT) % 89.4 %                                 



             (test code = 770-8)                                        

 

             IMM GRAN % (test code 0.50 %                                 



             = 6624874919)                                        

 

             LYMPH % (test code = 7.0 %                                  



             736-9)                                              

 

             MONO % (test code = 3.0 %                                  



             5905-5)                                             

 

             EOS % (test code = 0.0 %                                  



             713-8)                                              

 

             BASO % (test code = 0.1 %                                  



             706-2)                                              

 

             GRAN MAT x10^3(ANC) 9.90 10*3/uL 1.88-7.09    H            



             (test code =                                        



             7656064660)                                         

 

             IMM GRAN x10^3 (test 0.05 10*3/uL 0.00-0.06                 



             code = 6781187057)                                        

 

             LYMPH x10^3 (test code 0.78 10*3/uL 1.32-3.29    L            



             = 731-0)                                            

 

             MONO x10^3 (test code 0.33 10*3/uL 0.33-0.92                 



             = 742-7)                                            

 

             EOS x10^3 (test code = <0.03        0.03-0.39    L            



             711-2)                                              

 

             BASO x10^3 (test code <0.03        0.01-0.07                 



             = 704-7)                                            

 

             Lab Interpretation Abnormal                               



             (test code = 17851-0)                                        



[facility]

## 2023-05-23 VITALS — TEMPERATURE: 97.8 F

## 2023-05-23 VITALS — OXYGEN SATURATION: 99 % | SYSTOLIC BLOOD PRESSURE: 105 MMHG | DIASTOLIC BLOOD PRESSURE: 63 MMHG

## 2023-05-23 LAB — MORPHOLOGY BLD-IMP: (no result)

## 2023-05-23 NOTE — ER
Nurse's Notes                                                                                     

 [facility]                                                                 

Name: Ashley Ayala                                                                             

Age: 47 yrs                                                                                       

Sex: Female                                                                                       

: 1975                                                                                   

MRN: [id number]                                                                                   

Arrival Date: 2023                                                                          

Time: 19:51                                                                                       

Account#: [id number]                                                                            

Bed 6                                                                                             

Private MD:                                                                                       

Diagnosis: Infectious gastroenteritis and colitis, unspecified;Vomiting diarrhea, acute           

  gastroenteritis, GI upset                                                                       

                                                                                                  

Presentation:                                                                                     

                                                                                             

20:57 Chief complaint: Patient states: I don't know if it is my appendix but i feel like i am kd3 

      being stabbed and i am very nauseous and i am vomiting. Coronavirus screen: Vaccine         

      status: Patient reports being unvaccinated. Ebola Screen: No symptoms or risks              

      identified at this time. Initial Sepsis Screen: Does the patient meet any 2 criteria?       

      No. Patient's initial sepsis screen is negative. Does the patient have a suspected          

      source of infection? No. Patient's initial sepsis screen is negative. Risk Assessment:      

      Do you want to hurt yourself or someone else? Patient reports no desire to harm self or     

      others. Onset of symptoms was May 22, 2023.                                                 

20:57 Method Of Arrival: Ambulatory                                                           kd3 

20:57 Acuity: GURVINDER 3                                                                           kd3 

                                                                                                  

Triage Assessment:                                                                                

20:59 General: Appears uncomfortable, Behavior is calm, cooperative. Pain: Complains of pain  kd3 

      in right lower quadrant. GI: Abdomen is non-distended.                                      

                                                                                                  

OB/GYN:                                                                                           

                                                                                             

03:08 LMP N/A - unknown                                                                       kd3 

                                                                                                  

Historical:                                                                                       

- Allergies:                                                                                      

                                                                                             

20:59 Hydrocodone-Acetaminophen;                                                              kd3 

20:59 PENICILLINS;                                                                            kd3 

20:59 Codeine;                                                                                kd3 

- PMHx:                                                                                           

20:59 Ovarian cysts;                                                                          kd3 

- PSHx:                                                                                           

20:59 Cholecystectomy; tubal pregnancy;                                                       kd3 

                                                                                                  

- Immunization history:: Adult Immunizations up to date.                                          

- Social history:: Smoking status: unknown.                                                       

                                                                                                  

                                                                                                  

Screenin/23                                                                                             

00:46 [facility] ED Fall Risk Assessment (Adult) History of falling in the last 3 months,       vc1 

      including since admission No falls in past 3 months (0 pts) Confusion or Disorientation     

      No (0 pts) Intoxicated or Sedated No (0 pts) Impaired Gait No (0 pts) Mobility Assist       

      Device Used No (0 pt) Altered Elimination No (0 pt) Score/Fall Risk Level 0 - 2 = Low       

      Risk Oriented to surroundings, Maintained a safe environment, Educated pt \T\ family on     

      fall prevention, incl call for assistance when getting out of bed. Abuse screen: Denies     

      threats or abuse. Nutritional screening: No deficits noted. Tuberculosis screening: No      

      symptoms or risk factors identified.                                                        

                                                                                                  

Assessment:                                                                                       

                                                                                             

23:00 Reassessment: No changes from previously documented assessment. Patient and/or family   vc1 

      updated on plan of care and expected duration. Pain level reassessed. Patient is alert,     

      oriented x 3, equal unlabored respirations, skin warm/dry/pink. Patient states symptoms     

      have not improved.                                                                          

                                                                                             

00:00 Reassessment: Patient and/or family updated on plan of care and expected duration. Pain vc1 

      level reassessed. Patient is alert, oriented x 3, equal unlabored respirations, skin        

      warm/dry/pink. Patient states symptoms have improved.                                       

03:08 GI: Bowel sounds present X 4 quads. Abdomen is tender to palpation in right lower       kd3 

      quadrant.                                                                                   

                                                                                                  

Vital Signs:                                                                                      

                                                                                             

20:57  / 94; Pulse 72; Resp 19; Temp 97.8(O); Pulse Ox 100% on R/A; Weight 92.99 kg;    kd3 

      Height 5 ft. 5 in. ;                                                                        

23:00  / 79; Pulse 59; Resp 14; Pulse Ox 99% ;                                          vc1 

                                                                                             

00:00  / 60; Pulse 58; Resp 17; Pulse Ox 97% on R/A;                                    vc1 

01:00  / 61; Pulse 67; Resp 16; Pulse Ox 98% ;                                          vc1 

02:31 BP 98 / 73; Pulse 56; Resp 19; Pulse Ox 100% on R/A;                                    kd3 

03:07  / 63; Pulse 67; Resp 15; Pulse Ox 99% on R/A;                                    kd3 

                                                                                             

20:57 Body Mass Index 34.11 (92.99 kg, 165.1 cm)                                              kd3 

                                                                                                  

ED Course:                                                                                        

                                                                                             

19:55 Patient arrived in ED.                                                                  ja2 

20:17 Vishal Price PA is PHCP.                                                              jmm 

20:17 Elmer Simmons MD is Attending Physician.                                           m 

20:59 Triage completed.                                                                       kd3 

20:59 Arm band placed on left wrist.                                                          kd3 

22:07 CBC with Diff Sent.                                                                     kd3 

22:07 CMP Sent.                                                                               kd3 

22:07 Lipase Sent.                                                                            kd3 

22:45 Lab(s) recollected, by me, sent to lab.                                                 wm  

22:56 IV discontinued, intact, bleeding controlled, No redness/swelling at site.              wm  

22:57 Inserted saline lock: 20 gauge in left antecubital area, using aseptic technique. Blood wm  

      collected.                                                                                  

22:57 CBC with Diff Sent.                                                                     wm  

22:57 CMP Sent.                                                                               wm  

22:57 Lipase Sent.                                                                            wm  

23:00 Patient has correct armband on for positive identification. Bed in low position. Call   vc1 

      light in reach. Client placed on continuous cardiac and pulse oximetry monitoring. NIBP     

      monitoring applied.                                                                         

                                                                                             

00:43 Karin Hagen, RN is Primary Nurse.                                                  vc1 

00:59 CT Abd/Pelvis - IV Contrast Only In Process Unspecified.                                EDMS

02:30 Urinalysis w/ reflexes Sent.                                                            kd3 

02:30 Pregnancy Test, Urine Sent.                                                             kd3 

03:08 No provider procedures requiring assistance completed. IV discontinued, intact,         kd3 

      bleeding controlled, No redness/swelling at site. Pressure dressing applied.                

                                                                                                  

Administered Medications:                                                                         

                                                                                             

22:07 Drug: NS 0.9% IV 1000 ml Route: IV; Rate: 1 bolus; Site: right antecubital;             3 

                                                                                             

03:09 Follow up: IV Status: Completed infusion; IV Intake: 1000ml                             kd3 

                                                                                             

22:07 Drug: Ondansetron IVP 4 mg Route: IVP; Site: right antecubital;                         3 

05/23                                                                                             

03:09 Follow up: Response: No adverse reaction; Nausea is decreased                           3 

                                                                                             

22:07 Drug: morphine IVP or IV 4 mg Route: IVP; Infused Over: 4 mins; Site: right antecubital;3 

05/23                                                                                             

03:09 Follow up: Response: No adverse reaction; Pain is unchanged, physician notified         kd3 

                                                                                             

23:00 Drug: fentaNYL (PF) IVP 50 mcg Route: IVP; Site: left antecubital;                      1 

                                                                                             

03:09 Follow up: Response: No adverse reaction; Pain is decreased                             kd3 

01:00 Drug: Ketorolac IVP 30 mg Route: IVP; Site: left antecubital;                           vc1 

03:09 Follow up: Response: No adverse reaction; Pain is decreased                             kd3 

03:00 Drug: morphine IVP or IV 4 mg Route: IVP; Infused Over: 4 mins; Site: left antecubital; kd3 

03:09 Follow up: Response: No adverse reaction; Pain is decreased                             kd3 

03:00 Drug: metoCLOPramide IVP 10 mg Route: IVP; Site: left antecubital;                      kd3 

03:09 Follow up: Response: No adverse reaction                                                kd3 

                                                                                                  

                                                                                                  

Medication:                                                                                       

00:48 VIS not applicable for this client.                                                     vc1 

                                                                                                  

Intake:                                                                                           

03:09 IV: 1000ml; Total: 1000ml.                                                              kd3 

                                                                                                  

Outcome:                                                                                          

02:55 Discharge ordered by MD.                                                                sp4 

03:08 Discharged to home ambulatory.                                                          kd3 

03:08 Condition: stable                                                                           

03:08 Discharge instructions given to patient, family, Instructed on discharge instructions,      

      follow up and referral plans. medication usage, Demonstrated understanding of               

      instructions, follow-up care, medications.                                                  

03:10 Patient left the ED.                                                                    kd3 

                                                                                                  

Signatures:                                                                                       

Dispatcher [employer]                           EDMS                                                 

Vishal Price PA PA jmm Marsh, Wendy wm Alexander, Jessica                           ja2                                                  

Serenity Ruiz RN RN   kd3                                                  

Karin Hagen RN RN vc1                                                  

Elmer Simmons MD MD   sp4                                                  

                                                                                                  

**************************************************************************************************

## 2023-05-23 NOTE — EDPHYS
Physician Documentation                                                                           

 [facility]                                                                 

Name: Ashley Ayala                                                                             

Age: 47 yrs                                                                                       

Sex: Female                                                                                       

: 1975                                                                                   

MRN: [id number]                                                                                   

Arrival Date: 2023                                                                          

Time: 19:51                                                                                       

Account#: [id number]                                                                            

Bed 6                                                                                             

Private MD:                                                                                       

ED Physician Elmer Simmons                                                                    

HPI:                                                                                              

                                                                                             

21:01 This 47 yrs old Female presents to ER via Ambulatory with complaints of Abdominal Pain, jmm 

      Nausea.                                                                                     

21:01 The patient presents with abdominal pain. Onset: The symptoms/episode began/occurred    jmm 

      gradually, today. The symptoms do not radiate. Associated signs and symptoms: Pertinent     

      positives: nausea. The symptoms are described as sharp. Modifying factors: The symptoms     

      are alleviated by nothing, the symptoms are aggravated by nothing. The patient has not      

      experienced similar symptoms in the past.                                                   

                                                                                                  

OB/GYN:                                                                                           

                                                                                             

03:08 LMP N/A - unknown                                                                       kd3 

                                                                                                  

Historical:                                                                                       

- Allergies:                                                                                      

                                                                                             

20:59 Hydrocodone-Acetaminophen;                                                              kd3 

20:59 PENICILLINS;                                                                            kd3 

20:59 Codeine;                                                                                kd3 

- PMHx:                                                                                           

20:59 Ovarian cysts;                                                                          kd3 

- PSHx:                                                                                           

20:59 Cholecystectomy; tubal pregnancy;                                                       kd3 

                                                                                                  

- Immunization history:: Adult Immunizations up to date.                                          

- Social history:: Smoking status: unknown.                                                       

                                                                                                  

                                                                                                  

ROS:                                                                                              

21:01 Constitutional: Negative for fever, chills, and weight loss, Cardiovascular: Negative   jmm 

      for chest pain, palpitations, and edema, Respiratory: Negative for shortness of breath,     

      cough, wheezing, and pleuritic chest pain.                                                  

21:01 Abdomen/GI: Positive for nausea.                                                            

21:01 All other systems are negative.                                                             

                                                                                                  

Exam:                                                                                             

21:01 Constitutional:  This is a well developed, well nourished patient who is awake, alert,  jmm 

      and in no acute distress. Head/Face:  atraumatic. Eyes:  EOMI, no conjunctival erythema     

      appreciated ENT:  Moist Mucus Membranes Neck:  Trachea midline, Supple Chest/axilla:        

      Normal chest wall appearance and motion.   Cardiovascular:  Regular rate and rhythm.        

      No edema appreciated Respiratory:  Normal respirations, no respiratory distress             

      appreciated                                                                                 

21:01 Back:  Normal ROM Skin:  General appearance color normal MS/ Extremity:  Moves all          

      extremities, no obvious deformities appreciated, no edema noted to the lower                

      extremities  Neuro:  Awake and alert Psych:  Behavior is normal, Mood is normal,            

      Patient is cooperative and pleasant                                                         

21:01 Abdomen/GI: Inspection: abdomen appears normal, Bowel sounds: normal, Palpation: soft,      

      mild abdominal tenderness, in the right lower quadrant.                                     

                                                                                                  

Vital Signs:                                                                                      

20:57  / 94; Pulse 72; Resp 19; Temp 97.8(O); Pulse Ox 100% on R/A; Weight 92.99 kg;    kd3 

      Height 5 ft. 5 in. ;                                                                        

23:00  / 79; Pulse 59; Resp 14; Pulse Ox 99% ;                                          vc1 

                                                                                             

00:00  / 60; Pulse 58; Resp 17; Pulse Ox 97% on R/A;                                    vc1 

01:00  / 61; Pulse 67; Resp 16; Pulse Ox 98% ;                                          vc1 

02:31 BP 98 / 73; Pulse 56; Resp 19; Pulse Ox 100% on R/A;                                    kd3 

03:07  / 63; Pulse 67; Resp 15; Pulse Ox 99% on R/A;                                    3 

                                                                                             

20:57 Body Mass Index 34.11 (92.99 kg, 165.1 cm)                                              [facility] 

                                                                                                  

MDM:                                                                                              

                                                                                             

21:01 Patient medically screened.                                                             [facility] 

                                                                                             

02:51 Differential diagnosis: gastritis, pancreatitis, appendicitis, diverticulitis, viral    sp4 

      gastroenteritis, gastroenteritis. Data reviewed: vital signs, nurses notes, old medical     

      records, lab test result(s), radiologic studies, doppler. ED course: CT abdomen and         

      pelvis with IV contrast -no acute abdominal pelvic findings, no evidence of acute           

      appendicitis, fluid-filled small and large bowel contents can be seen in the setting of     

      diarrheal illness. Prominent left-sided ovarian follicles and cysts with largest            

      measuring 3 cm. Patient reported some left lower abdominal pain and she will be given       

      additional pain medicine as well as prescriptions for pain nausea and diarrhea to take      

      at home. Patient will be stable to discharge home.                                          

                                                                                                  

                                                                                             

21:01 Order name: CBC with Diff; Complete Time: 02:51                                         [facility] 

                                                                                             

21:01 Order name: CMP; Complete Time: 23:46                                                   [facility] 

                                                                                             

21:01 Order name: Lipase; Complete Time: 23:46                                                [facility] 

                                                                                             

21:01 Order name: Pregnancy Test, Urine; Complete Time: 02:53                                 [facility] 

                                                                                             

23:36 Order name: Manual Differential; Complete Time: 02:51                                   [facility]

                                                                                             

01:53 Order name: Urinalysis w/ reflexes; Complete Time: 02:51                                [facility] 

                                                                                             

21:01 Order name: CT Abd/Pelvis - IV Contrast Only; Complete Time: 17:25                      [facility] 

                                                                                             

21:01 Order name: IV Saline Lock; Complete Time: 22:07                                        [facility] 

                                                                                             

21:01 Order name: Labs collected and sent; Complete Time: 22:07                               [facility] 

                                                                                                  

Administered Medications:                                                                         

                                                                                             

22:07 Drug: NS 0.9% IV 1000 ml Route: IV; Rate: 1 bolus; Site: right antecubital;             3 

                                                                                             

03:09 Follow up: IV Status: Completed infusion; IV Intake: 1000ml                             3 

                                                                                             

22:07 Drug: Ondansetron IVP 4 mg Route: IVP; Site: right antecubital;                         3 

                                                                                             

03:09 Follow up: Response: No adverse reaction; Nausea is decreased                           3 

                                                                                             

22:07 Drug: morphine IVP or IV 4 mg Route: IVP; Infused Over: 4 mins; Site: right antecubital;kd3 

                                                                                             

03:09 Follow up: Response: No adverse reaction; Pain is unchanged, physician notified         kd3 

                                                                                             

23:00 Drug: fentaNYL (PF) IVP 50 mcg Route: IVP; Site: left antecubital;                      1 

                                                                                             

03:09 Follow up: Response: No adverse reaction; Pain is decreased                             kd3 

01:00 Drug: Ketorolac IVP 30 mg Route: IVP; Site: left antecubital;                           vc1 

03:09 Follow up: Response: No adverse reaction; Pain is decreased                             kd3 

03:00 Drug: morphine IVP or IV 4 mg Route: IVP; Infused Over: 4 mins; Site: left antecubital; kd3 

03:09 Follow up: Response: No adverse reaction; Pain is decreased                             kd3 

03:00 Drug: metoCLOPramide IVP 10 mg Route: IVP; Site: left antecubital;                      kd3 

03:09 Follow up: Response: No adverse reaction                                                kd3 

                                                                                                  

                                                                                                  

Disposition:                                                                                      

02:51 Co-signature as Attending Physician, Elmer Simmons MD I agree with the assessment    sp4 

      and plan of care. I reviewed the patient's care provided by Advanced Practice Provider      

      \T\ agree w/ the diagnosis \T\ care plan. I personally saw the pt \T\ performed a substantive

      portion of the visit, incldng all aspects of the (History/Exam/Medical Decision Making).    

                                                                                                  

Disposition Summary:                                                                              

23 02:55                                                                                    

Discharge Ordered                                                                                 

      Location: Home                                                                          sp4 

      Problem: new                                                                            sp4 

      Symptoms: have improved                                                                 sp4 

      Condition: Stable                                                                       sp4 

      Diagnosis                                                                                   

        - Infectious gastroenteritis and colitis, unspecified                                 sp4 

        - Vomiting diarrhea, acute gastroenteritis, GI upset                                  sp4 

      Followup:                                                                               sp4 

        - With: Private Physician                                                                  

        - When: 5 - 6 days                                                                         

        - Reason: Recheck today's complaints                                                       

      Discharge Instructions:                                                                     

        - Discharge Summary Sheet                                                             sp4 

        - Diarrhea, Adult, Easy-to-Read                                                       sp4 

      Forms:                                                                                      

        - Medication Reconciliation Form                                                      sp4 

      Prescriptions:                                                                              

        - naproxen sodium 500 mg Oral Tablet, ER Multiphase 24 hr                                  

            - take 1 tablet by ORAL route every 12 hours; 30 tablet; Refills: 0, Product      sp4 

      Selection Permitted                                                                         

        - Zofran 4 mg Oral Tablet                                                                  

            - take 1 tablet by ORAL route every 6 hours As needed PRN nausea; 30 tablet;      sp4 

      Refills: 0, Product Selection Permitted                                                     

        - Lomotil 2.5-0.025 mg Oral Tablet                                                         

            - take 1 tablet by ORAL route every 6 hours As needed PRN diarrhea; 30 tablet;    sp4 

      Refills: 0, Product Selection Permitted                                                     

Signatures:                                                                                       

Dispatcher [employer]                           EDVishal Lang PA PA jmm Doucette, Kyli, RN                      RN   kd3                                                  

Karin Hagen RN                    RN   vc1                                                  

Elmer Simmons MD MD   sp4                                                  

                                                                                                  

**************************************************************************************************

## 2023-05-23 NOTE — RAD REPORT
EXAM DESCRIPTION:

Abdomen    Pelvis W Contrast

 

CLINICAL HISTORY:  47 years   Female   right lower abdominal pain

 

COMPARISON:  CT abdomen pelvis 11/16/2018.

 

TECHNIQUE:  CT of the abdomen and pelvis with intravenous contrast.

All CT scans at this facility use dose modulation, iterative reconstruction, and/or weight based dosi
ng when appropriate to reduce radiation dose to as low as reasonably achievable.

 

FINDINGS:  Lower thorax:

Lung bases are clear.

Small hiatal hernia.

Abdomen:

Stomach: Within normal limits

Liver: No focal lesions. Postcholecystectomy prominence of the biliary ductal system.

Gallbladder: Surgically absent.

Pancreas: Within normal limits

Spleen: Within normal limits

Right kidney: No hydronephrosis. No focal lesion.

Left kidney: No hydronephrosis. No focal lesion.

Adrenal glands: Within normal limits

Vascular structures: Within normal limits

Nodes: No lymphadenopathy by size criteria

Pelvis:

Small bowel: No significant distention. Fluid-filled contents.

Appendix: Within normal limits

Colon: No distention or acute pericolonic edema. Fluid-filled contents.

Peritoneum: No free intraperitoneal fluid or air.

Bones: No acute bone findings.

Bladder: Unremarkable.

Reproductive organs: No acute findings. Prominent left-sided ovarian follicles/cysts, largest measuri
ng 3 cm. Hypodensity in the cervix, likely nabothian cyst.

Soft tissues:

Tiny fat-containing umbilical and supraumbilical ventral abdominal hernias.

 

IMPRESSION:  1.   No acute abdominopelvic findings. No evidence of acute appendicitis.

2.   Fluid-filled small and large bowel contents, can be seen in setting of diarrheal illness.

3.   Prominent left-sided ovarian follicles/cysts, largest measuring 3 cm. No follow-up imaging is re
commended.

 

Electronically signed by:   Keerthi Alfaro MD   5/23/2023 2:32 AM CDT Workstation: [id number]

 

 

 

Due to temporary technical issues with the PACS/Fluency reporting system, reports are being signed by
 the in house radiologists without review as a courtesy to insure prompt reporting. The interpreting 
radiologist is fully responsible for the content of the report.

## 2023-08-29 ENCOUNTER — HOSPITAL ENCOUNTER (EMERGENCY)
Dept: HOSPITAL 97 - ER | Age: 48
Discharge: HOME | End: 2023-08-29
Payer: COMMERCIAL

## 2023-08-29 VITALS — OXYGEN SATURATION: 98 % | DIASTOLIC BLOOD PRESSURE: 78 MMHG | SYSTOLIC BLOOD PRESSURE: 127 MMHG

## 2023-08-29 VITALS — TEMPERATURE: 97.7 F

## 2023-08-29 DIAGNOSIS — F17.210: ICD-10-CM

## 2023-08-29 DIAGNOSIS — Z88.5: ICD-10-CM

## 2023-08-29 DIAGNOSIS — L03.115: Primary | ICD-10-CM

## 2023-08-29 DIAGNOSIS — Z88.0: ICD-10-CM

## 2023-08-29 LAB
BUN BLD-MCNC: 12 MG/DL (ref 7–18)
GLUCOSE SERPLBLD-MCNC: 92 MG/DL (ref 74–106)
HCT VFR BLD CALC: 38.8 % (ref 36–45)
INR BLD: 1.1
LYMPHOCYTES # SPEC AUTO: 1.6 K/UL (ref 0.7–4.9)
MCV RBC: 88.3 FL (ref 80–100)
NT-PROBNP SERPL-MCNC: 103 PG/ML (ref ?–125)
PMV BLD: 8.3 FL (ref 7.6–11.3)
POTASSIUM SERPL-SCNC: 3.6 MEQ/L (ref 3.5–5.1)
RBC # BLD: 4.39 M/UL (ref 3.86–4.86)
TROPONIN I SERPL HS-MCNC: 4.2 PG/ML (ref ?–58.9)
WBC # BLD AUTO: 5.4 THOU/UL (ref 4.3–10.9)

## 2023-08-29 PROCEDURE — 96365 THER/PROPH/DIAG IV INF INIT: CPT

## 2023-08-29 PROCEDURE — 83880 ASSAY OF NATRIURETIC PEPTIDE: CPT

## 2023-08-29 PROCEDURE — 85025 COMPLETE CBC W/AUTO DIFF WBC: CPT

## 2023-08-29 PROCEDURE — 36415 COLL VENOUS BLD VENIPUNCTURE: CPT

## 2023-08-29 PROCEDURE — 93970 EXTREMITY STUDY: CPT

## 2023-08-29 PROCEDURE — 73630 X-RAY EXAM OF FOOT: CPT

## 2023-08-29 PROCEDURE — 71045 X-RAY EXAM CHEST 1 VIEW: CPT

## 2023-08-29 PROCEDURE — 84484 ASSAY OF TROPONIN QUANT: CPT

## 2023-08-29 PROCEDURE — 96375 TX/PRO/DX INJ NEW DRUG ADDON: CPT

## 2023-08-29 PROCEDURE — 93005 ELECTROCARDIOGRAM TRACING: CPT

## 2023-08-29 PROCEDURE — 85610 PROTHROMBIN TIME: CPT

## 2023-08-29 PROCEDURE — 73590 X-RAY EXAM OF LOWER LEG: CPT

## 2023-08-29 PROCEDURE — 99284 EMERGENCY DEPT VISIT MOD MDM: CPT

## 2023-08-29 PROCEDURE — 83605 ASSAY OF LACTIC ACID: CPT

## 2023-08-29 PROCEDURE — 93926 LOWER EXTREMITY STUDY: CPT

## 2023-08-29 PROCEDURE — 80048 BASIC METABOLIC PNL TOTAL CA: CPT

## 2023-08-29 NOTE — RAD REPORT
EXAM DESCRIPTION:  US - Extrem Venous W Compress Jonathon - 8/29/2023 6:31 pm

 

CLINICAL HISTORY:  SWELLING

Bilateral leg edema and swelling.

 

COMPARISON:  Extremity Venous [facility] Ltd dated 5/10/2022

 

TECHNIQUE:  Real-time sonographic interrogation of the left and right lower extremity deep venous sys
tems was performed.

 

FINDINGS:  Normal compressibility, flow augmentation, phasic flow and spontaneous flow is identified 
in both the left and right lower extremity deep venous systems.

 

IMPRESSION:  No sonographic evidence of left or right lower extremity deep venous thrombosis.

## 2023-08-29 NOTE — RAD REPORT
EXAM DESCRIPTION:  RAD - Tib Fib Right - 8/29/2023 6:13 pm

 

CLINICAL HISTORY:  PAIN

 

COMPARISON:  Tib Fib Right dated 7/22/2023

 

FINDINGS:  Lateral fibular sideplate with multiple screws noted. Medial malleolar screws also present
. No hardware loosening seen. Mild soft tissue swelling is seen about the ankle.

## 2023-08-29 NOTE — RAD REPORT
EXAM DESCRIPTION:  US - Lower Extremity Artery [facility] - 8/29/2023 8:05 pm

 

CLINICAL HISTORY:  Pain;Swelling

 

COMPARISON:  <Comparisons>

 

FINDINGS:  Right lower extremity arterial system was interrogated. The right common femoral artery is
 triphasic. The right superficial and popliteal arteries are triphasic.

 

The right posterior tibial artery and dorsalis pedis artery are monophasic.

No complete occlusion evident.

 

IMPRESSION:  Moderate infrapopliteal flow diminishment as detailed.

## 2023-08-29 NOTE — XMS REPORT
Continuity of Care Document

                           Created on:2023



Patient:HOMER KING

Sex:Female

:1975

External Reference #:[id number]





Demographics







                          Address                   [address]  



                                                    [place] [address]

 

                          Home Phone                [phone]

 

                          Mobile Phone              [phone]

 

                          Email Address             [email or web address]

 

                          Preferred Language        und

 

                          Marital Status            Unknown

 

                          Baptism Affiliation     Unknown

 

                          Race                      Unknown

 

                          Additional Race(s)        Unavailable



                                                    White

 

                          Ethnic Group              Unknown









Author







                          Organization              [facility]

t

 

                          Address                   [address]



                                                    [place] [address]

 

                          Phone                     [phone]









Support







                Name            Relationship    Address         Phone

 

                YASMIN KING [place]              [address]   [phone]

[phone]



                                                [place] [address] 

 

                YASMIN KING [place]              Unavailable     [phone]

 

                CHANTALE KING  M               [address] [facility]  [phone]

[phone]



                                                [place] [address] 









Care Team Providers







                    Name                Role                Phone

 

                    José Antonio Lr Primary Care Physician [phone]

 

                    ALBERT BERRIOS Attending Clinician Unavailable

 

                    Cisco Ge   Attending Clinician [phone]

 

                    Sandifer PA, Heather Attending Clinician [phone]

 

                    ALBERT BERRIOS Attending Clinician Unavailable

 

                    GONZÁLEZ BARROW  Attending Clinician Unavailable

 

                    Suzan Tovar LCSW Attending Clinician [phone]

 

                    Ken Padilla MD   Attending Clinician [phone]

 

                    KEN PADILLA      Attending Clinician Unavailable

 

                    Doctor Unassigned, No Name Attending Clinician Unavailable

 

                    Shelley Draper RN     Attending Clinician Unavailable

 

                    CAMILO RASMUSSEN     Attending Clinician Unavailable

 

                    Yoandy ASHTON MD, Chilvana Attending Clinician [phone]

 

                    Giselle Renee Attending Clinician [phone]

 

                    BRIT LANGE Attending Clinician Unavailable

 

                    [facility] Unavailable

 

                    Honey Moreno Attending Clinician [phone]

 

                    Brit Feliz Attending Clinician [phone]

[phone]

 

                    KEN PADILLA      Admitting Clinician Unavailable









Payers







           Payer Name Policy Type Policy Number Effective Date Expiration Date S

bienvenido

 

           [employer]            [id number] 2023 



                                            00:00:00   00:00:00   

 

           JEREMÍAS TERRAZAS -            [id number] 2023            



           [employer]                            00:00:00              

 

           [employer]            [id number]                        



           HEALTHCARE                                             







Problems







       Condition Condition Condition Status Onset  Resolution Last   Treating Co

mments 

Source



       Name   Details Category        Date   Date   Treatment Clinician        



                                                 Date                 

 

       Displaced Displaced Disease Active                              [facility]



       fracture fracture               8-15                               Health



       of lateral of lateral               00:00:                             



       malleolus malleolus               00                                 



       of right of right                                                  



       fibula, fibula,                                                  



       initial initial                                                  



       encounter encounter                                                  



       for closed for closed                                                  



       fracture fracture                                                  

 

       Displaced Displaced Disease Active                              [facility]



       bimalleola bimalleola               8-15                               He

alth



       r fracture r fracture               00:00:                             



       of right of right               00                                 



       lower leg, lower leg,                                                  



       initial initial                                                  



       encounter encounter                                                  



       for open for open                                                  



       fracture fracture                                                  



       type I or type I or                                                  



       II     II                                                      

 

       Cervical Cervical Disease Active                              Unive

rs



       stenosis stenosis               2-16                               ity of



       of spine of spine               00:00:                             [place]



                                   00                                 Medical



                                                                      Branch

 

       Abnormalit Abnormalit Disease Active                              U

nivers



       y of right y of right               604                               it

y of



       breast on breast on               00:00:                             [place]

[place]



       screening screening               00                                 Medi

allyson



       mammogram mammogram                                                  [facility]

ch

 

       Right  Right  Disease Active                       Overview: Univer

s



       ovarian ovarian               -                        Formattin ity o

f



       cyst   cyst                 00:00:                      g of this [place]



                                   00                          note   Medical



                                                               might be Branch



                                                               different 



                                                               from the 



                                                               original. 



                                                               1.8 x 1.8 



                                                               x 2.1 cm 



                                                               right  



                                                               ovarian 



                                                               hemorrhag 



                                                               ic cyst 



                                                               on 19 



                                                               pelvic US 

 

       Intramural Intramural Disease Active                       Overview

: Univers



       leiomyoma leiomyoma                                       Formatt[facility] i

ty of



       of uterus of uterus               00:00:                      g of this [place]

[place]



                                   00                          note   Medical



                                                               might be Branch



                                                               different 



                                                               from the 



                                                               original. 



                                                               1 x 0.8 



                                                               cm on  



                                                               19 



                                                               pelvic US 

 

       Screen for Screen for Disease Active                              U

nivers



       STD    STD                  5-29                               ity of



       (sexually (sexually               00:00:                             [place]

[place]



       transmitte transmitte               00                                 Me

dical



       d disease) d disease)                                                  Br

anch

 

       BMI    BMI    Disease Active                              Univers



       31.0-31.9, 31.0-31.9,               5-29                               it

y of



       adult  adult                00:00:                             [place]



                                   00                                 Medical



                                                                      Branch

 

       Positive Positive Disease Active                              Unive

rs



       depression depression                                              it

y of



       screening screening               00:00:                             [place]

[facility]



                                   00                                 Medical



                                                                      Branch

 

       S/P tubal S/P tubal Disease Active                              Uni

vers



       ligation ligation                                              ity of



                                   00:00:                             [place]



                                   00                                 Medical



                                                                      Branch

 

       Trichomona Trichomona Disease Active                              U

nivers



       l      l                                                   ity of



       vulvovagin vulvovagin               00:00:                             [place]

[place]



       itis   itis                                                  Medical



                                                                      Branch

 

       Menorrhagi Menorrhagi Disease Active                              U

nivers



       a with a with                                              ity of



       regular regular               00:00:                             [place]



       cycle  cycle                                                 Medical



                                                                      Branch

 

       Enlarged Enlarged Disease Active                              Unive

rs



       uterus uterus                                              ity of



                                   00:00:                             [place]



                                   00                                 Medical



                                                                      Branch

 

       S/P tubal S/P tubal Disease Active                              Uni

vers



       ligation ligation                                              ity of



                                   00:00:                             [place]



                                                                    Medical



                                                                      Branch

 

       Menorrhagi Menorrhagi Disease Active                              U

nivers



       a with a with                                              ity of



       regular regular               00:00:                             [place]



       cycle  cycle                                                 Medical



                                                                      Branch







Allergies, Adverse Reactions, Alerts







       Allergy Allergy Status Severity Reaction(s) Onset  Inactive Treating Comm

ents 

Source



       Name   Type                        Date   Date   Clinician        

 

       Penicill Drug   Active        Hives  -                      Univers



       in     Allergy                                              ity of



                                          00:00:                      [place]



                                          00                          Medical



                                                                      Branch

 

       PENICILL DRUG   Active Med    Hives                        Univers



       IN     INGREDI                                              ity of



                                          00:00:                      [place]



                                          00                          Medical



                                                                      Branch

 

       [facility] Drug   Active        Hives                        Univers



       in     Allergy                                              ity of



                                          00:00:                      [place]



                                                                    Medical



                                                                      Branch

 

       Penicill Allergy Active        Rash   2018                      [facility]



       ins    to                          0-30                        Health



              substanc                      00:00:                      



              e                           00                          

 

       Amoxicil Propensi Active        Hives                        Univer

s



       frandy    ty to                       1-23                        ity of



              adverse                      00:00:                      [place]



              reaction                      00                          Medical



              s                                                       Branch

 

       AMOXICIL DRUG   Active        Hives  2017-                      Univers



       FRANDY    INGREDI                      1-23                        ity of



                                          00:00:                      [place]



                                          00                          Medical



                                                                      Branch

 

       Hydrocod Propensi Active        Nausea 2016                      Univer

s



       one-Acet ty to                and/or 2-21                        ity of



       aminophe adverse               Vomiting 00:00:                      [place]



       n      reaction                      00                          Medical



              s                                                       Branch

 

       HYDROCOD DRUG   Active        N/V    2016                      Univers



       ONE-ACET                             2-21                        ity of



       AMINOPHE                             00:00:                      [place]



       N                                  00                          Medical



                                                                      Branch

 

       Codeine Allergy Active        Rash   -                      [facility]



              to                                                  Health



              substanc                      00:00:                      



              e                           00                          

 

       Codeine Propensi Active        Rash   -                      Univers



              ty to                                               ity of



              adverse                      00:00:                      [place]



              reaction                      00                          Medical



              s                                                       Branch

 

       CODEINE DRUG   Active        Rash                         Univers



              INGREDI                                              ity of



                                          00:00:                      [place]



                                          00                          Medical



                                                                      Branch







Social History







           Social Habit Start Date Stop Date  Quantity   Comments   Source

 

           History of tobacco 1999            Cigarette Smoker            

University of



           use        00:00:00                                    [place] [facility]



                                                                  Branch

 

           History [facility] o

f



           Alcohol Frequency                                             [facility]

edical



                                                                  Branch

 

           History [facility] o

f



           Alcohol Std Drinks                                             [place] 

Medical



                                                                  Branch

 

           History [facility] o

f



           Alcohol Binge                                             [place] [facility]

al



                                                                  Branch

 

           Exposure to 2023 Not sure              University of



           SARS-CoV-2 (event) 00:00:00   09:01:00                         [facility]

 

           Alcohol intake 2023 Current drinker            Unive

rsity of



                      00:00:00   00:00:00   of alcohol            [facility]



                                            (finding)             Branch

 

           Cigarettes smoked 2019                       Univers

ity of



           current (pack per 00:00:00   00:00:00                         [facility]

edical



           day) - Reported                                             Branch

 

           Alcohol Comment 2019 ocassional            Universit

y of



                      00:00:00   00:00:00                         [facility]

 

           Tobacco use and 2019 Smokeless tobacco            Un

iversity of



           exposure   00:00:00   00:00:00   non-user              [facility]

 

           [facility] Assigned At 1975                       [facility]



           Birth      00:00:00   00:00:00                         









                Smoking Status  Start Date      Stop Date       Source

 

                Tobacco smoking consumption                                 [facility]

e[facility]



                unknown                                         

 

                Smokes tobacco daily 2019 00:00:00                 Univers

ity of [facility]







Medications







       Ordered Filled Start  Stop   Current Ordering Indication Dosage Frequency

 Signature

                    Comments            Components          Source



     Medication Medication Date Date Medication? Clinician                (SIG) 

          



     Name Name                                                   

 

     sulfamethox      2023- Yes       [id number] 1{tbl} Q.5D Take 1        

   [facility]



     azole-trime                                tablet by           Epifanio crooks



     thoprim      00:00: 04:59                          mouth in           



     (Bactrim      00   :00                           the            



     DS) 800-160                                         morning           



     MG tablet                                         and 1           



                                                  tablet in           



                                                  the            



                                                  evening.           



                                                  Do all           



                                                  this for 7           



                                                  days.           

 

     traMADol            Yes       [id number] 50mg Q6H  Take 1           [facility]



     (Ultram) 50      -16                               tablet (50           He

alth



     MG tablet      00:00:                               mg total)           



               00                                 by mouth           



                                                  every 6           



                                                  (six)           



                                                  hours if           



                                                  needed for           



                                                  severe           



                                                  pain.           

 

     methocarbam      2023- Yes       [id number] 500mg      Take 1         

  [facility]



     ol                                  tablet           Health



     (Robaxin)      00:00: 04:59                          (500 mg           



     500 MG      00   :00                           total) by           



     tablet                                         mouth           



                                                  every 8           



                                                  (eight)           



                                                  hours if           



                                                  needed for           



                                                  muscle           



                                                  spasms for           



                                                  up to 10           



                                                  days.           

 

     sulfamethox      2023- Yes       [id number] 1{tbl} Q.5D Take 1        

   [facility]



     azole-trime                                tablet by           He

alth



     thoprim      00:00: 04:59                          mouth in           



     (Bactrim      00   :00                           the            



     DS) 800-160                                         morning           



     MG tablet                                         and 1           



                                                  tablet in           



                                                  the            



                                                  evening.           



                                                  Do all           



                                                  this for 7           



                                                  days.           

 

     oxyCODONE      2023- No        [id number] 5mg  Q6H  Take 1           U

T



     (Roxicodone                                tablet (5           He

alth



     ) 5 MG      00:00: 04:59                          mg total)           



     immediate      00   :00                           by mouth           



     release                                         every 6           



     tablet                                         (six)           



                                                  hours if           



                                                  needed for           



                                                  severe           



                                                  pain for           



                                                  up to 6           



                                                  days.           

 

     gabapentin      2023- Yes       [id number] 300mg [id number] Take 1    

       [facility]



     (Neurontin)                           [id number] capsule         

  [facility]



     300 MG      00:00: 04:59                     3D   (300 mg           



     capsule      00   :00                           total) by           



                                                  mouth in           



                                                  the            



                                                  morning           



                                                  and 1           



                                                  capsule           



                                                  (300 mg           



                                                  total) at           



                                                  noon and 1           



                                                  capsule           



                                                  (300 mg           



                                                  total) in           



                                                  the            



                                                  evening.           

 

     gabapentin      2023- Yes       [id number] 300mg [id number] Take 1    

       [facility]



     (Neurontin)                           [id number] capsule         

  [facility]



     300 MG      00:00: 04:59                     3D   (300 mg           



     capsule      00   :00                           total) by           



                                                  mouth in           



                                                  the            



                                                  morning           



                                                  and 1           



                                                  capsule           



                                                  (300 mg           



                                                  total) at           



                                                  noon and 1           



                                                  capsule           



                                                  (300 mg           



                                                  total) in           



                                                  the            



                                                  evening.           

 

     methocarbam      2023- No        [id number] 500mg      Take 1         

  [facility]



     ol        8-07 08-16                          tablet           [facility]



     (Robaxin)      00:00: 00:00                          (500 mg           



     500 MG      00   :00                           total) by           



     tablet                                         mouth 1           



                                                  (one) time           



                                                  each day           



                                                  if needed           



                                                  for muscle           



                                                  spasms for           



                                                  up to 20           



                                                  days.           

 

     methocarbam      3-0      Yes       [id number] 500mg Q.25D Take 1         

  [facility]



     ol        7-27                               tablet           [facility]



     (Robaxin)      00:00:                               (500 mg           



     500 MG      00                                 total) by           



     tablet                                         mouth in           



                                                  the            



                                                  morning           



                                                  and 1           



                                                  tablet           



                                                  (500 mg           



                                                  total) at           



                                                  noon and 1           



                                                  tablet           



                                                  (500 mg           



                                                  total) in           



                                                  the            



                                                  evening           



                                                  and 1           



                                                  tablet           



                                                  (500 mg           



                                                  total)           



                                                  before           



                                                  bedtime.           



                                                  Do all           



                                                  this for           



                                                  10 days.           

 

     methocarbam      2023-0      Yes       [id number] 500mg Q.25D Take 1         

  [facility]



     ol        7-27                               tablet           [facility]



     (Robaxin)      00:00:                               (500 mg           



     500 MG      00                                 total) by           



     tablet                                         mouth in           



                                                  the            



                                                  morning           



                                                  and 1           



                                                  tablet           



                                                  (500 mg           



                                                  total) at           



                                                  noon and 1           



                                                  tablet           



                                                  (500 mg           



                                                  total) in           



                                                  the            



                                                  evening           



                                                  and 1           



                                                  tablet           



                                                  (500 mg           



                                                  total)           



                                                  before           



                                                  bedtime.           



                                                  Do all           



                                                  this for           



                                                  10 days.           

 

     gabapentin      -0      Yes       [id number] 300mg      Take 1           

[facility]



     300 mg      3-07                               capsule by           ity of



     capsule      00:00:                               mouth 3           [place]



               00                                 (three)           Medical



                                                  times           [place]



                                                  daily as           



                                                  needed for           



                                                  Pain           



                                                  (scale           



                                                  7-10).           

 

     gabapentin      -0      Yes       [id number] 300mg      Take 1           

[facility]



     300 mg      3-07                               capsule by           ity of



     capsule      00:00:                               mouth 3           [place]



               00                                 (three)           Medical



                                                  times           [place]



                                                  daily as           



                                                  needed for           



                                                  Pain           



                                                  (scale           



                                                  7-10).           

 

     FENTanyl PF      -2022- No             25ug      25 mcg,           [facility]

[facility]



     (SUBLIMAZE                                Slow IV           ity o

f



     (PF))      17:30: 16:24                          Push,           [place]



     injection      00   :00                           ONCE, 1           Medical



     25 mcg                                         dose, On           [facility]



                                                  Thu            



                                                  22 at           



                                                  1130,           



                                                  Routine           

 

     famotidine      0      Yes            20mg      20 mg,           [facility]

rs



     (PEPCID AC)      2-17                               Oral, BID,           it

y of



     tablet 20      14:00:                               First dose           [place]

[place]



     mg        00                                 on [facility]



                                                  22 at           [facility]



                                                  0800,           



                                                  Until           



                                                  Discontinu           



                                                  ed,            



                                                  Routine           

 

     docusate      0      Yes            100mg      100 mg,           [facility]

rs



     (COLACE)      2-17                               Oral, BID,           ity o

f



     capsule 100      14:00:                               First dose           

[place]



     mg        00                                 on [facility]



                                                  22 at           [facility]



                                                  0800,           



                                                  Until           



                                                  Discontinu           



                                                  ed,            



                                                  Routine           

 

     gabapentin      -0      Yes            300mg      300 mg,           Uni

vers



     (NEURONTIN)      217                               Oral, TID,           it

y of



     capsule 300      14:00:                               First dose           

[place]



     mg        00                                 on [facility]



                                                  22 at           [facility]



                                                  0800,           



                                                  Until           



                                                  Discontinu           



                                                  ed,            



                                                  Routine           

 

     traMADoL      -0      Yes            50mg      50 mg,           [facility]



     (ULTRAM)                                     Oral,           ity of



     tablet 50      03:35:                               Q6HPRN,           [place]



     mg        17                                 Starting           Medical



                                                  on Wed           [place]



                                                  22 at           



                                                  2135,           



                                                  Until           



                                                  Discontinu           



                                                  ed,            



                                                  Routine,           



                                                  Pain           



                                                  (scale           



                                                  4-6)           

 

     acetaminoph      0      Yes            325mg      325 mg,           Un

[facility]



     en                                       Oral,           ity of



     (TYLENOL)      03:35:                               Q6HPRN,           [place]



     tablet 325      11                                 Starting           Medic

al



     mg                                           on Wed           [place]



                                                  22 at           



                                                  2135,           



                                                  Until           



                                                  Discontinu           



                                                  ed,            



                                                  Routine,           



                                                  Pain           



                                                  (scale           



                                                  1-3)           

 

     bisacodyL      0      Yes            10mg      10 mg,           [facility]

s



     (DULCOLAX)                                     Rectal,           ity of



     suppository      03:32:                               QHSPRN,           [place]

as



     10 mg      01                                 Starting           Medical



                                                  on Wed           [place]



                                                  22 at           



                                                  2132,           



                                                  Until           



                                                  Discontinu           



                                                  ed,            



                                                  Routine,           



                                                  Constipati           



                                                  on             

 

     NaCl 0.9%      0      Yes            5mL       5 mL, Slow           Un

[facility]



     (NS)                                     IV Push,           ity of



     injection 5      03:32:                               PRN - SEE           [facility]

[place]



     mL        01                                 [facility],            [facility]



                                                  Starting           



                                                  on 22 at           



                                                  2132,           



                                                  Until           



                                                  Discontinu           



                                                  ed, 10 mL           

 

     methylPREDN      -0      Yes       [id number]           Take by          

 [facility]



     ISolone 4      2-17                               mouth           ity of



     mg tablets      00:00:                               SEE-INSTRU           T

[place]



               00                                 CTIONS.           Medical



                                                  follow           [facility]



                                                  package           



                                                  directions           

 

     methylPREDN      -0      Yes       [id number]           Take by          

 [facility]



     ISolone 4      2-17                               mouth           ity of



     mg tablets      00:00:                               SEE-INSTRU           T

[place]



               00                                 CTIONS.           Medical



                                                  follow           [facility]



                                                  package           



                                                  directions           

 

     methylPREDN      -0      Yes       [id number]           Take by          

 [facility]



     ISolone 4      2-17                               mouth           ity of



     mg tablets      00:00:                               SEE-INSTRU           T

[place]



               00                                 CTIONS.           Medical



                                                  follow           [facility]



                                                  package           



                                                  directions           

 

     methylPREDN      2022-0      Yes       [id number]           Take by          

 [facility]



     ISolone 4      2-17                               mouth           ity of



     mg tablets      00:00:                               SEE-INSTRU           T

[place]



               00                                 CTIONS.           Medical



                                                  follow           [facility]



                                                  package           



                                                  directions           

 

     methylPREDN      2022-0      Yes       [id number]           Take by          

 [facility]



     ISolone 4      2-17                               mouth           ity of



     mg tablets      00:00:                               SEE-INSTRU           T

[place]



               00                                 CTIONS.           Medical



                                                  follow           [facility]



                                                  package           



                                                  directions           

 

     methylPREDN      2022-0      Yes       [id number]           Take by          

 [facility]



     ISolone 4      2-17                               mouth           ity of



     mg tablets      00:00:                               SEE-INSTRU           T

[place]



               00                                 CTIONS.           Medical



                                                  follow           [facility]



                                                  package           



                                                  directions           

 

     methylPREDN      -0      Yes       [id number]           Take by          

 [facility]



     ISolone 4      2-17                               mouth           ity of



     mg tablets      00:00:                               SEE-INSTRU           T

[place]



               00                                 CTIONS.           Medical



                                                  follow           [facility]



                                                  package           



                                                  directions           

 

     methylPREDN      -0      Yes       [id number]           Take by          

 [facility]



     ISolone 4      2-17                               mouth           ity of



     mg tablets      00:00:                               SEE-INSTRU           T

[place]



               00                                 CTIONS.           Medical



                                                  follow           [facility]



                                                  package           



                                                  directions           

 

     methylPREDN      -0      Yes       [id number]           Take by          

 [facility]



     ISolone 4      2-17                               mouth           ity of



     mg tablets      00:00:                               SEE-INSTRU           T

[place]



               00                                 CTIONS.           Medical



                                                  follow           [facility]



                                                  package           



                                                  directions           

 

     methylPREDN      2-0      Yes       [id number]           Take by          

 [facility]



     ISolone 4      2-17                               mouth           ity of



     mg tablets      00:00:                               SEE-INSTRU           T

[facility]



               00                                 CTIONS.           Medical



                                                  follow           [facility]



                                                  package           



                                                  directions           

 

     methylPREDN      2022-0      Yes       [id number]           Take by          

 [facility]



     ISolone 4      2-17                               mouth           ity of



     mg tablets      00:00:                               SEE-INSTRU           T

[facility]



               00                                 CTIONS.           Medical



                                                  follow           [facility]



                                                  package           



                                                  directions           

 

     methylPREDN      2-0      Yes       [id number]           Take by          

 [facility]



     ISolone 4      2-17                               mouth           ity of



     mg tablets      00:00:                               SEE-INSTRU           T

[facility]



               00                                 CTIONS.           Medical



                                                  follow           [facility]



                                                  package           



                                                  directions           

 

     gabapentin      -0 202- No        [id number] 300mg      Take 1          

 [facility]



     300 mg      2-17 03-20                          capsule by           ity of



     capsule      00:00: 04:59                          mouth 3           [place]



               00   :00                           (three)           Medical



                                                  times           [facility]



                                                  daily for           



                                                  30 days.           

 

     gabapentin      202-0 202- No        [id number] 300mg      Take 1          

 [facility]



     300 mg      2-17 03-20                          capsule by           ity of



     capsule      00:00: 04:59                          mouth 3           [place]



               00   :00                           (three)           Medical



                                                  times           [facility]



                                                  daily for           



                                                  30 days.           

 

     gabapentin      2022- No        [id number] 300mg      Take 1          

 [facility]



     300 mg      2-17 03-20                          capsule by           ity of



     capsule      00:00: 04:59                          mouth 3           [place]



               00   :00                           (three)           Medical



                                                  times           [facility]



                                                  daily for           



                                                  30 days.           

 

     LOESTRIN       2019- No        [id number] 1{tbl}      Take 1       

    [facility]



     (MICROGESTI      5-29 07-29                          tablet by           it

y of



     N FE )      00:00: 00:00                          mouth           [place]



     1 mg-20 mcg      00   :00                           daily.           Medica

l



     (21)/75 mg                                                        Branch



     (7) tablet                                                        

 

     LOESTRIN       2019- No        [id number] 1{tbl}      Take 1       

    [facility]



     (MICROGESTI      5-29 07-29                          tablet by           it

y of



     N FE )      00:00: 00:00                          mouth           [place]



     1 mg-20 mcg      00   :00                           daily.           Medica

l



     (21)/75 mg                                                        Branch



     (7) tablet                                                        

 

     LOESTRIN 2019- No        [id number] 1{tbl}      Take 1       

    [facility]



     (MICROGESTI      5-29 07-29                          tablet by           it

y of



     N FE )      00:00: 00:00                          mouth           [place]



     1 mg-20 mcg      00   :00                           daily.           Medica

l



     (21)/75 mg                                                        Branch



     (7) tablet                                                        

 

     LOESTRIN 2019- No        [id number] 1{tbl}      Take 1       

    [facility]



     (MICROGESTI      5-29 07-29                          tablet by           it

y of



     N FE )      00:00: 00:00                          mouth           [place]



     1 mg-20 mcg      00   :00                           daily.           Medica

l



     (21)/75 mg                                                        Branch



     (7) tablet                                                        

 

     No known                No                                      Univers



     medications                                                        ity [facility]

 

     No known                No                                      Univers



     medications                                                        ity [facility]

 

     No known                No                                      [facility]



     [facility]                                                        [facility]

 

     No known                No                                      [facility]



     medications                                                        [facility]







Vital Signs







             Vital Name   Observation Time Observation Value Comments     Source

 

             Body height  2023 15:15:00 165.1 cm                  Universi

[facility]

 

             Body weight  2023 15:15:00 92.987 kg                 Universi

ty of



                                                                 [place] [facility]



                                                                 [place]

 

             BMI          2023 15:15:00 34.11 kg/m2               Universi

ty of



                                                                 [place] [facility]



                                                                 Branch

 

             Systolic blood 2022 18:00:00 107 mm[Hg]                Univer

sity of



             pressure                                            [place] [facility]



                                                                 Branch

 

             Diastolic blood 2022 18:00:00 58 mm[Hg]                 Unive

rsity of



             [facility]

 

             Heart rate   2022 18:00:00 61 /min                   Universi

ty of



                                                                 [facility]

 

             Body temperature 2022 18:00:00 36.44 Faith                 Univ

ersity of



                                                                 [facility]

 

             Respiratory rate 2022 18:00:00 20 /min                   Univ

ersity of



                                                                 [facility]

 

             Oxygen saturation in 2022 18:00:00 100 /min                  

[facility]



             Arterial blood by                                        [place] [facility]

[place]



             Pulse oximetry                                        Branch

 

             Body height  2022 03:37:00 165.1 cm                  Universi

ty of



                                                                 [facility]

 

             Body weight  2022 01:00:00 86.183 kg                 Universi

ty of



                                                                 [place] [facility]



                                                                 [place]

 

             BMI          2022 01:00:00 31.62 kg/m2               Universi

ty of



                                                                 [place] [facility]



                                                                 Branch

 

             Systolic blood 2019 20:12:00 129 mm[Hg]                Univer

sity of



             pressure                                            [facility]



                                                                 Branch

 

             Diastolic blood 2019 20:12:00 74 mm[Hg]                 Unive

rsity of



             [facility]

 

             Heart rate   2019 20:12:00 57 /min                   Universi

ty of



                                                                 [facility]

 

             Body temperature 2019 20:12:00 36.11 Faith                 Univ

ersity of



                                                                 [facility]

 

             Respiratory rate 2019 20:12:00 16 /min                   Univ

ersity of



                                                                 [facility]



                                                                 Branch

 

             Body height  2019 20:12:00 165.1 cm                  Universi

ty of



                                                                 [place] [facility]



                                                                 [place]

 

             Body weight  2019 20:12:00 85.333 kg                 Universi

ty of



                                                                 [place] [facility]



                                                                 Branch

 

             BMI          2019 20:12:00 31.31 kg/m2               Universi

ty of



                                                                 [place] [facility]



                                                                 Branch

 

             Systolic blood 2019 20:37:00 116 mm[Hg]                Univer

sity of



             pressure                                            [facility]

 

             Diastolic blood 2019 20:37:00 80 mm[Hg]                 Unive

rsity of



             pressure                                            [facility]

 

             Body temperature 2019 20:32:00 36.44 Faith                 [facility]

 

             Respiratory rate 2019 20:32:00 16 /min                   [facility]

 

             Body height  2019 20:32:00 165.1 cm                  [facility]

 

             Body weight  2019 20:32:00 85.276 kg                 [facility]

 

             BMI          2019 20:32:00 31.28 kg/m2               [facility]







Procedures







                Procedure       Date / Time     Performing Clinician Source



                                Performed                       

 

                CONSENT/REFUSAL FOR 2023 15:02:05 Doctor [facility]assigned, No [facility]



                DIAGNOSIS AND TREATMENT                 Name            [facility]

 

                AUTHORIZATION FOR 2022 05:01:00 Doctor Unassigned, No [facility]



                RELEASE OF PHI                  Name            [facility] Branch

 

                EXTERNAL PROVIDER 2022 06:01:00 Doctor Unassigned, No [facility]



                RECORDS                         Name            Medical [place]

 

                ABORH CONFIRMATION (LAB 2022 11:40:00 Bhanu Melvin 

[facility]



                ONLY)                                           Medical Branch

 

                HB ABO GROUPING 2022 11:06:00 Bhanu Melvin [facility]

 

                BASIC METABOLIC PANEL 2022 11:05:00 [facility]



                (NA, K, CL, CO2,                                 Medical Branch



                GLUCOSE, BUN,                                   



                CREATININE, CA)                                 

 

                CBC WITH DIFF   2022 11:05:00 Bhanu Melvin [facility]

 

                PROTHROMBIN TIME / INR 2022 11:05:00 [facility] [place]

 

                ACTIVATED PARTIAL 2022 11:05:00 Bhanu Melvin [facility]







Encounters







        Start   End     Encounter Admission Attending Care    Care    Encounter 

Source



        Date/Time Date/Time Type    Type    Clinicians Facility Department ID   

   

 

        2023         Outpatient                 UTH     UTH     [id number]

 [facility]



        09:03:27                                                 [id number] [facility]

 

        2023         Outpatient                 UTH     UTH     [id number]

 [facility]



        09:08:54                                                 [id number] [facility]

 

        2023         Outpatient                 UTH     UTH     [id number]

 [facility]



        08:24:53                                                 [id number] [facility]

 

        2023         Outpatient                 UTH     UTH     [id number]

 [facility]



        13:31:39                                                 [id number] [facility]

 

        2023         Outpatient                 UTH     UTH     [id number]

 [facility]



        10:13:03                                                 [id number] [facility]

 

        2023 [facility]         [facility]     [id number]

[id number] [facility]



        12:00:00 12:00:00                 ALBERT fish

 

        2023 Office          NICK Kent 6414 [id number]

[id number] [place]



        12:30:00 12:43:04 Visit           Cisco SANDERS .1.13.58         

Health



                                                        9.2.7.2.686         



                                                        [phone]         



                                                        1               

 

        2023 Outpatient                 [facility]     [id number]

[id number] [place]



        08:56:47 08:56:47                                         0823    F



                                                                        [place]

 

        2023 Office          Sandifer, UTP 6414 1.2.840.114 [id number]

[id number] [place]



        13:30:00 15:09:20 Visit           Homer TRACYN .1.13.58         

Health



                                                        9.2.7.2.686         



                                                        [phone]         



                                                        1               

 

        2023 Outpatient                 [facility]     [id number]

[id number] [place]



        13:30:00 15:09:20                                                 Health

 

        2023 Outpatient         [facility]    [id number]

[id number]



        05:40:00 23:59:00                 ALBERT                       

 

        2023 Office          Ronaldo [facility] 6414 [id number]

[id number] [place]



        13:15:00 13:58:54 Visit           Albert ROXANNA CHAVESMARILYN .1.13.58      

   Health



                                                        9.2.7.2.686         



                                                        [phone]         



                                                        1               

 

        2023 Outpatient BAHMAN BARROW [facility]    [id number]

[id number] Univers



        00:00:00 00:00:00                 GONZÁLEZ hoover [facility]

 

        2023 Patient         Guy [facility]    1.2.840.114 [id number]

[id number] Univers



        00:00:00 00:00:00 Outreach         Suzan [facility]  350.1.13.10         i

ty of



                                                CLEAR   4.2.7.2.686         [place]

[place]



                                                ALDANA    [phone]         [address]



                                                OFFICE                  



                                                BUILDING                 

 

        2023 Telephone         Randy  [facility]    [id number]

[id number] Univers



        00:00:00 00:00:00                 Ken HEALTH  350.1.13.10         it

y of



                                                CLEAR   4.2.7.2.686         [place]

[place]



                                                ALDANA    [phone]         [address]



                                                OFFICE                  



                                                BUILDING                 

 

        2023 [facility]          [facility]    1.2.840.114 [id number]

[id number] Univers



        09:20:00 09:40:00 Visit           Ken [facility]  350.1.13.10         it

y of



                                                CLEAR   4.2.7.2.686         [place]

[place]



                                                ALDANA    [phone]         [address]



                                                OFFICE                  



                                                [facility]                 

 

        2023 Outpatient R       KEN PADILLA [facility]    

[id number] Univers



        09:20:00 09:20:00                 KEN PADILLA                        

 itscarlet of



                                                                        [facility]

 

        2023 Orders          Doctor  KADEN    1.2.840.114 [id number]

[id number] Univers



        00:00:00 00:00:00 Only            Unassigned, KIT   350.1.13.10       

  ity of



                                        [facility] 4.2.7.2.686         [place]

as



                                                        [phone]         Medi

allyson



                                                        009             Branch

 

        2023 Nurse           KADEN Draper    1.2.840.114 [id number]

[id number] Univers



        00:00:00 00:00:00 Triage          Shelley   KIT   350.1.13.10         it

y of



                                                HOSPITAL 4.2.7.2.686         [place]

as



                                                        [phone]         Medi

allyson



                                                        019             Branch

 

        2023 Telephone         Randy  [facility]    [id number]

[id number] Univers



        00:00:00 00:00:00                 Ken HEALTH  350.1.13.10         it

y of



                                                CLEAR   4.2.7.2.686         [place]

[place]



                                                ALDANA    [phone]         [address]



                                                OFFICE                  



                                                BUILDING                 

 

        2022 Outpatient R       KEN PADILLA [facility]    

[id number] Univers



        11:00:00 11:00:00                 KEN PADILLA                        

 kiko [facility]

 

        2022 Outpatient R       KEN PADILLA [facility]    

[id number] Univers



        15:00:00 15:00:00                 KEN PADILLA                        

 kiko [facility]

 

        2022 Outpatient R       KEN PADILLA [facility]    

[id number] Univers



        14:30:00 14:30:00                 KEN PDAILLA                        

 kiko [facility]

 

        2022 Outpatient R       RASMUSSEN  [facility]    [id number]

[id number] Univers



        11:01:18 23:59:00                 [facility]                         ity o

f



                                                                        [facility]

 

        2022 [facility]         Yoandy  [facility]    1.2.840.114 [id number]

[id number] Univers



        11:01:18 23:59:00 Encounter         [facility]  350.1.13.10        

 ity of



                                                CLEAR   4.2.7.2.686         [place]

[facility]    [phone]         [address]



                                                OFFICE                  



                                                BUILDING                 

 

        2022 Orders          Doctor  KADEN    1.2.840.114 [id number]

[id number] Univers



        00:00:00 00:00:00 Only            Unassigned, KIT   350.1.13.10       

  ity of



                                        [facility] 4.2.7.2.686         [place]

as



                                                        [phone]         [address]

 

        2022 Orders          Doctor  KADEN    1.2.840.114 [id number]

[id number] Univers



        00:00:00 00:00:00 Only            Unassigned, KIT   350.1.13.10       

  ity of



                                        [facility] 4.2.7.2.686         [place]

as



                                                        [phone]         [address]

 

        2022 Outpatient U       RANDY KEN [facility]    SNS     

[id number] Univers



        20:51:00 14:00:00                 KEN PADILLA [facility]

 

        2022 [facility]         HARVEY Padilla  1.2.840.114 [id number]

[id number] Univers



        20:51:00 14:00:00 Encounter         Ken KIT   350.1.13.10         

ity of



                                                [facility] 42.7.2.686         [place]

as



                                                        [phone]         [address]

 

        2021-03-15 2021-03-15 Outpatient R               [facility]    [id number]

[id number] Univers



        16:00:00 16:00:00                                                 ity of



                                                                        [facility]

 

        2021 Telephone         [facility]    1.2.840.114 [id number]

[id number] Univers



        00:00:00 00:00:00                 Giselle DOAN OB/GYN  350.1.13.10         it

y of



                                                REGIONAL 4.2.7.2.686         [place]

as



                                                MATERNAL [phone]         Med

ical



                                                & CHILD [address] [place] [address]                 

 

        [facility]    [id number]

[id number] Univers



        08:45:00 08:45:00                 BRIT garcia

f



                                                                        [facility]

 

        2020 Telephone         [facility]    1.2.840.114 [id number]

[id number] Univers



        00:00:00 00:00:00                 Giselle DOAN OB/GYN  350.1.13.10         it

y of



                                                REGIONAL 4.2.7.2.686         [place]

as



                                                MATERNAL [phone]         Med

ical



                                                & CHILD [address] [place] [address]                 

 

        2019 [facility]           [facility]    1.2

.840.114 [id number] 

Univers



        14:57:22 15:24:24 Visit           Honey Chavira OB/GY[facility]  350.1.13.10

         ity of



                                                REGIONAL 4.2.7.2.686         [place]

as



                                                MATERNAL [phone]         Med

ical



                                                & CHILD [address] [place] [address]                 

 

        2019 Nurse           [facility]    1.2

.840.114 [id number] 

Univers



        16:05:21 16:13:38 Visit           Brit Lange OB/GY[facility]  350.1.13.

10         ity of



                                                REGIONAL 4.2.7.2.686         [place]

as



                                                MATERNAL [phone]         Med

ical



                                                & CHILD 107             [facility]                 

 

        2019 Office          Fan [facility]    1.2.840.114 [id number]

[id number] Univers



        14:48:45 16:29:10 Visit           Honey RUGGIERO OB/GYN  350.1.13.10        

 ity of



                                                REGIONAL 4.2.7.2.686         [place]

as



                                                MATERNAL [phone]         Med

ical



                                                & CHILD 107             [facility]                 







Results







           Test Description Test Time  Test Comments Results    Result Comments 

Source









                    ABORH Confirmation (Lab Only) 2022 13:30:16 









                      Test Item  Value      Reference Range Interpretation Comme

nts









             ABO & RH (test code = 20) A Positive                             Pe

rformed at [facility] - 

[facility]



                                                                 Blood Bcfp068 U

[place], [place] [address]Fiet

 Free: [phone] No.



                                                                 [id number]



[facility] (NA, K, CL, CO2, 
GLUCOSE, BUN, CREATININE, CA)2022 12:14:34





             Test Item    Value        Reference Range Interpretation Comments

 

             NA (test code = 135 mmol/L   135-145                   



             [id number])                                         

 

             K (test code = 4.3 mmol/L   3.5-5.0                   



             7956717763)                                         

 

             CL (test code = 108 mmol/L                       



             8625114333)                                         

 

             CO2 TOTAL (test code = 22 mmol/L    23-31        L            



             8034232819)                                         

 

             AGAP (test code =              2-16                      



             4928388779)                                         

 

             BUN (test code = 12 mg/dL     7-23                      



             0905580700)                                         

 

             GLUCOSE (test code = 131 mg/dL           H            



             1596544627)                                         

 

             CREATININE (test code = 0.70 mg/dL   0.50-1.04                 



             6247897095)                                         

 

             CALCIUM (test code = 8.3 mg/dL    8.6-10.6     L            



             [id number])                                         

 

             eGFR (test code =              mL/min/1.73m2              



             3326576700)                                         

 

             KATTY (test code = KATTY) Association of                           



                          Glomerular Filtration                           



                          Rate (GFR) and Staging                           



                          of Kidney Disease*                           



                          +---------------------                           



                          --+-------------------                           



                          --+-------------------                           



                          ------+| GFR                           



                          (mL/min/1.73 m2) ?|                           



                          With Kidney Damage ?|                           



                          ?Without Kidney                           



                          Damage+---------------                           



                          --------+-------------                           



                          --------+-------------                           



                          ------------+| ?>90 ?                           



                          ? ? ? ? ? ? ? ?|                           



                          ?Stage one ? ? ? ? ?|                           



                          ? Normal ? ? ? ? ? ? ?                           



                          ?+--------------------                           



                          ---+------------------                           



                          ---+------------------                           



                          -------+| ?60-89 ? ? ?                           



                          ? ? ? ? ?| ?Stage two                           



                          ? ? ? ? ?| ? Decreased                           



                          GFR ? ? ? ?                            



                          +---------------------                           



                          --+-------------------                           



                          --+-------------------                           



                          ------+| ?30-59 ? ? ?                           



                          ? ? ? ? ?| ?Stage                           



                          three ? ? ? ?| ? Stage                           



                          three ? ? ? ? ?                           



                          +---------------------                           



                          --+-------------------                           



                          --+-------------------                           



                          ------+| ?15-29 ? ? ?                           



                          ? ? ? ? ?| ?Stage four                           



                          ? ? ? ? | ? Stage four                           



                          ? ? ? ? ?                              



                          ?+--------------------                           



                          ---+------------------                           



                          ---+------------------                           



                          -------+| ?<15 (or                           



                          dialysis) ? ?| ?Stage                           



                          five ? ? ? ? | ? Stage                           



                          five ? ? ? ? ?                           



                          ?+--------------------                           



                          ---+------------------                           



                          ---+------------------                           



                          -------+ *Each stage                           



                          assumes the associated                           



                          GFR level has been in                           



                          effect for at least                           



                          three months. ?Stages                           



                          1 to 5, with or                           



                          without kidney                           



                          disease, indicate                           



                          chronic kidney                           



                          disease. Notes:                           



                          Determination of                           



                          stages one and two                           



                          (with eGFR                             



                          >59mL/min/1.73 m2)                           



                          requires estimation of                           



                          kidney damage for at                           



                          least three months as                           



                          defined by structural                           



                          or functional                           



                          abnormalities of the                           



                          kidney, manifested by                           



                          either:Pathological                           



                          abnormalities or                           



                          Markers of kidney                           



                          damage (including                           



                          abnormalities in the                           



                          composition of the                           



                          blood or urine or                           



                          abnormalities in                           



                          imaging tests).                           

 

             Lab Interpretation Abnormal                               



             (test code = 94877-1)                                        



[facility] [id number] 
11:55:01





             Test Item    Value        Reference Range Interpretation Comments

 

             ABO & RH (test code A POSITIVE                             Performe

d at [facility]



             = 20)                                               Laboratory Serv

ices -



                                                                 [facility]

[address] [place] [address]



                                                                 [id number] Free:



                                                                 [phone]

A No.



                                                                 [id number]

 

             IAT (test code = Negative                               Performed a

t [facility]



             1185)                                               Laboratory Serv

ices -



                                                                 [facility]

[address] [place] [address]

s



                                                                 [id number] Free:



                                                                 [phone]

A No.



                                                                 [id number]



[facility] / [id number] 11:29:46





             Test Item    Value        Reference Range Interpretation Comments

 

             PROTIME PATIENT (test              See_Comment                [Auto

mated message]



             code = 5964-2)                                        The system [employer]



                                                                 generated this 

result



                                                                 transmitted ref

erence



                                                                 range: 10.1 - 1

2.6



                                                                 Seconds. The re

ference



                                                                 range was not u

sed to



                                                                 interpret this 

result



                                                                 as normal/abnor

mal.

 

             INR (test code = 6301-6)                                        Nor

mal INR <1.1;



                                                                 Warfarin Therap

eutic



                                                                 range 2.0 to 3.

0 or



                                                                 2.5 to 3.5, dep

ending



                                                                 upon the indica

tions.

 

             Lab Interpretation (test Normal                                 



             code = 79938-4)                                        



[facility] [id number] 
11:29:46





             Test Item    Value        Reference Range Interpretation Comments

 

             APTT Patient (test code              See_Comment  L             [Au

tomated message]



             = 3173-2)                                           The system [employer]

h



                                                                 generated this 

result



                                                                 transmitted ref

erence



                                                                 range: 26 - 36



                                                                 Seconds. The



                                                                 reference range

 was



                                                                 not used to int

erpret



                                                                 this result as



                                                                 normal/abnormal

.

 

             Lab Interpretation (test Abnormal                               



             code = 18186-4)                                        



[facility] [id number] 11:22:42





             Test Item    Value        Reference Range Interpretation Comments

 

             WBC (test code =              See_Comment                [Automated



             5790-2)                                             message] The sy

stem



                                                                 which generated



                                                                 this result



                                                                 transmitted



                                                                 reference range

:



                                                                 4.30 - 11.10



                                                                 10*3/?L. The



                                                                 reference range

 was



                                                                 not used to



                                                                 interpret this



                                                                 result as



                                                                 normal/abnormal

.

 

             RBC (test code =              See_Comment  L             [Automated



             789-8)                                              message] The sy

stem



                                                                 which generated



                                                                 this result



                                                                 transmitted



                                                                 reference range

:



                                                                 3.93 - 5.25



                                                                 10*6/?L. The



                                                                 reference range

 was



                                                                 not used to



                                                                 interpret this



                                                                 result as



                                                                 normal/abnormal

.

 

             HGB (test code = 11.6 g/dL    11.6-15.0                 



             718-7)                                              

 

             HCT (test code = 35.2 %       35.7-45.2    L            



             4544-3)                                             

 

             MCV (test code = 92.6 fL      80.6-95.5                 



             787-2)                                              

 

             MCH (test code = 30.5 pg      25.9-32.8                 



             785-6)                                              

 

             MCHC (test code = 33.0 g/dL    31.6-35.1                 



             786-4)                                              

 

             RDW-SD (test code = 47.0 fL      39.0-49.9                 



             95194-4)                                            

 

             RDW-CV (test code = 13.8 %       12.0-15.5                 



             788-0)                                              

 

             PLT (test code =              See_Comment                [Automated



             777-3)                                              message] The sy

stem



                                                                 which generated



                                                                 this result



                                                                 transmitted



                                                                 reference range

:



                                                                 166 - 358 10*3/

?L.



                                                                 The reference r

raine



                                                                 was not used to



                                                                 interpret this



                                                                 result as



                                                                 normal/abnormal

.

 

             MPV (test code = 10.8 fL      9.5-12.9                  



             39880-3)                                            

 

             NRBC/100 WBC (test              See_Comment                [Automat

ed



             code = 1899763644)                                        message] 

The system



                                                                 which generated



                                                                 this result



                                                                 transmitted



                                                                 reference range

:



                                                                 0.0 - 10.0 /100



                                                                 WBCs. The refer

ence



                                                                 range was not u

sed



                                                                 to interpret th

is



                                                                 result as



                                                                 normal/abnormal

.

 

             NRBC x10^3 (test code <0.01        See_Comment                [Auto

mated



             = [id number])                                        message] The s

ystem



                                                                 which generated



                                                                 this result



                                                                 transmitted



                                                                 reference range

:



                                                                 10*3/?L. The



                                                                 reference range

 was



                                                                 not used to



                                                                 interpret this



                                                                 result as



                                                                 normal/abnormal

.

 

             GRAN MAT (NEUT) % 89.4 %                                 



             (test code = 770-8)                                        

 

             IMM GRAN % (test code 0.50 %                                 



             = [id number])                                        

 

             LYMPH % (test code = 7.0 %                                  



             736-9)                                              

 

             MONO % (test code = 3.0 %                                  



             5905-5)                                             

 

             EOS % (test code = 0.0 %                                  



             713-8)                                              

 

             BASO % (test code = 0.1 %                                  



             706-2)                                              

 

             GRAN MAT x10^3(ANC) 9.90 10*3/uL 1.88-7.09    H            



             (test code =                                        



             4257939717)                                         

 

             IMM GRAN x10^3 (test 0.05 10*3/uL 0.00-0.06                 



             code = 9758983365)                                        

 

             LYMPH x10^3 (test code 0.78 10*3/uL 1.32-3.29    L            



             = 731-0)                                            

 

             MONO x10^3 (test code 0.33 10*3/uL 0.33-0.92                 



             = 742-7)                                            

 

             EOS x10^3 (test code = <0.03        0.03-0.39    L            



             711-2)                                              

 

             BASO x10^3 (test code <0.03        0.01-0.07                 



             = 704-7)                                            

 

             Lab Interpretation Abnormal                               



             (test code = 82424-5)                                        



[facility]

## 2023-08-29 NOTE — RAD REPORT
EXAM DESCRIPTION:  RAD - Foot Right 3 View - 8/29/2023 6:13 pm

 

CLINICAL HISTORY:  Swelling;Pain

 

COMPARISON:  No comparisons

 

FINDINGS:  Moderate soft tissue swelling is seen about the ankle. Medial malleolar screws are noted w
ithout evidence loosening. Fibular sideplate also present. No soft tissue gas. No finding to indicate
 osteomyelitis.

## 2023-08-29 NOTE — EDPHYS
Physician Documentation                                                                           

 [facility]                                                                 

Name: Ashley Ayala                                                                             

Age: 48 yrs                                                                                       

Sex: Female                                                                                       

: 1975                                                                                   

MRN: [id number]                                                                                   

Arrival Date: 2023                                                                          

Time: 17:17                                                                                       

Account#: [id number]                                                                            

Bed 20                                                                                            

Private MD:                                                                                       

ED Physician Artemio Pitt                                                                         

HPI:                                                                                              

                                                                                             

17:45 This 48 yrs old Female presents to ER via Wheelchair with complaints of Feet Swelling.  cp  

17:45 Patient is a 48-year-old female who presents to the emergency department with concern   cp  

      for swelling of her right lower leg and foot. Patient reports having right ankle            

      hardware removed on 2023 by Dr. Kwabena Higgins at [facility]. Patient     

      reports she has noticed some redness in her foot and lower leg and so she has been          

      taking prescribed Bactrim but after calling the office today with concern for swelling      

      she was told to come to [place] for evaluation but due to transportation issues she         

      came to our facility for evaluation and concern for DVT.                                    

                                                                                                  

Historical:                                                                                       

- Allergies:                                                                                      

17:31 Codeine;                                                                                cm10

17:31 Hydrocodone-Acetaminophen;                                                              cm10

17:31 PENICILLINS;                                                                            cm10

- PMHx:                                                                                           

17:31 Ovarian cysts;                                                                          cm10

- PSHx:                                                                                           

17:31 Cholecystectomy; tubal pregnancy;                                                       cm10

                                                                                                  

- Immunization history:: Adult Immunizations unknown.                                             

- Social history:: Smoking status: Patient reports the use of cigarette tobacco                   

  products, smokes one-half pack cigarettes per day.                                              

                                                                                                  

                                                                                                  

ROS:                                                                                              

17:50 Constitutional: Positive for chills, Negative for fever, poor PO intake.                cp  

17:50 Cardiovascular: Negative for chest pain, palpitations.                                      

17:50 Respiratory: Negative for cough, shortness of breath, wheezing.                             

17:50 Abdomen/GI: Negative for abdominal pain, nausea, vomiting, and diarrhea.                    

17:50 MS/extremity: Positive for erythema, paresthesias, swelling, tenderness, warmth, of the cp  

      right lower leg and right ankle and right foot.                                             

17:50 Eyes: Negative for injury, pain, redness, and discharge.                                cp  

17:50 Back: Negative for pain at rest, pain with movement.                                        

17:50 Neuro: Negative for altered mental status, dizziness, headache, weakness.                   

17:50 All other systems are negative.                                                             

                                                                                                  

Exam:                                                                                             

17:55 Constitutional: The patient appears in no acute distress, alert, awake, non-toxic, well cp  

      developed, well nourished, overweight                                                       

17:55 Head/Face:  Normocephalic, atraumatic.                                                  cp  

17:55 Eyes: Periorbital structures: appear normal, Conjunctiva: normal, no exudate, no            

      injection, Sclera: no appreciated abnormality, Lids and lashes: appear normal,              

      bilaterally.                                                                                

17:55 ENT: External ear(s): are unremarkable, Nose: is normal, Mouth: Lips: moist, Oral           

      mucosa: moist.                                                                              

17:55 Chest/axilla: Inspection: normal.                                                           

17:55 Cardiovascular: Rate: normal, Rhythm: regular.                                              

17:55 Respiratory: the patient does not display signs of respiratory distress,  Respirations:     

      normal, no use of accessory muscles, Breath sounds: are clear throughout, no decreased      

      breath sounds, no stridor, no wheezing.                                                     

17:55 Abdomen/GI: Inspection: abdomen appears normal, Palpation: abdomen is soft and              

      non-tender.                                                                                 

17:55 Musculoskeletal/extremity: Extremities: noted in the right lower leg and right foot:        

      erythema, swelling, sutures in place with no dehiscence, no drainage noted.                 

18:11 ECG was reviewed by the Attending Physician.                                              

                                                                                                  

Vital Signs:                                                                                      

17:29  / 64; Pulse 72; Resp 16; Temp 97.7; Pulse Ox 98% ; Weight 92.99 kg; Height 5 ft. cm10

      5 in. ; Pain 10/10;                                                                         

18:58  / 73; Pulse 57; Resp 17 S; Pulse Ox 97% on R/A;                                  kc6 

20:32  / 78; Pulse 57; Resp 16; Pulse Ox 98% ;                                          bp  

17:29 Body Mass Index 34.11 (92.99 kg, 165.1 cm)                                              cm10

17:29 Pain Scale: Adult                                                                       cm10

                                                                                                  

MDM:                                                                                              

17:32 Patient medically screened.                                                               

19:52 Data reviewed: vital signs, nurses notes, lab test result(s), EKG, radiologic studies,  cp  

      plain films, ultrasound.                                                                    

19:52 Differential diagnosis: osteomyelitis, sepsis, cellulitis, DVT. Consideration of        cp  

      Admission/Observation Escalation of care including admission/observation considered. I      

      considered the following discharge prescriptions or medication management in the            

      emergency department Medications were administered in the Emergency Department. See         

      MAR. Counseling: I had a detailed discussion with the patient and/or guardian regarding     

      the historical points, exam findings, and any diagnostic results supporting the             

      discharge/admit diagnosis, lab results, radiology results, the need for outpatient          

      follow up, a orthopedic surgeon, to return to the emergency department if symptoms          

      worsen or persist or if there are any questions or concerns that arise at home.             

      Response to treatment: the patient's symptoms have mildly improved after treatment, and     

      as a result, I will discharge patient.                                                      

                                                                                                  

                                                                                             

17:40 Order name: Basic Metabolic Panel; Complete Time: 19:05                                   

                                                                                             

19:05 Interpretation: Normal except: ; CRE 1.10; GFR 63.                                  

                                                                                             

17:40 Order name: CBC with Diff; Complete Time: 19:05                                           

                                                                                             

17:40 Order name: NT PRO-BNP; Complete Time: 19:05                                              

                                                                                             

17:40 Order name: PT-INR; Complete Time: 19:05                                                  

                                                                                             

17:40 Order name: Troponin HS; Complete Time: 19:05                                             

                                                                                             

17:40 Order name: Lactate w/ 2H reflex if indic.; Complete Time: 19:05                          

                                                                                             

17:40 Order name: US Extremity Venous W Compression Jonathon; Complete Time: 19:05                   

                                                                                             

17:40 Order name: XRAY Chest (1 view); Complete Time: 19:05                                     

                                                                                             

17:41 Order name: XRAY Foot RIGHT 3 View; Complete Time: 19:05                                  

                                                                                             

17:41 Order name: XRAY Tib Fib RIGHT; Complete Time: 19:05                                      

                                                                                             

19:06 Interpretation: Report reviewed.                                                          

                                                                                             

19:15 Order name: US Lower Extremity [employer]                                             

                                                                                             

17:40 Order name: EKG; Complete Time: 17:40                                                     

                                                                                             

17:40 Order name: Cardiac monitoring; Complete Time: 18:14                                      

                                                                                             

17:40 Order name: EKG - Nurse/Tech; Complete Time: 18:14                                        

                                                                                             

17:40 Order name: IV Saline Lock; Complete Time: 18:14                                          

                                                                                             

17:40 Order name: Labs collected and sent; Complete Time: 18:14                                 

                                                                                             

17:40 Order name: O2 Per Protocol; Complete Time: 18:14                                         

                                                                                             

17:40 Order name: O2 Sat Monitoring; Complete Time: 18:14                                     cp  

                                                                                                  

EC:11 Rate is 59 beats/min. Rhythm is regular. OH interval is normal. QRS interval is normal. cp  

      QT interval is normal. Interpreted by me. Reviewed by me.                                   

                                                                                                  

Administered Medications:                                                                         

19:29 Drug: Clindamycin IVPB 900 mg Route: IVPB; Infused Over: 30 mins; Site: left            bp  

      antecubital;                                                                                

20:05 Follow up: Response: No adverse reaction; IV Status: Completed infusion; IV Intake:     eh3 

      100ml                                                                                       

19:45 Drug: fentaNYL (PF) IVP 25 mcg Route: IVP; Site: left antecubital;                      bp  

20:33 Follow up: Response: No adverse reaction                                                bp  

                                                                                                  

                                                                                                  

Disposition Summary:                                                                              

23 19:53                                                                                    

Discharge Ordered                                                                                 

      Location: Home(23 19:53)                                                          cp  

      Problem: new(23 19:53)                                                            cp  

      Symptoms: have improved(23 19:53)                                                 cp  

      Condition: Stable(23 19:53)                                                       cp  

      Diagnosis                                                                                   

        - Cellulitis of right lower limb(23 19:53)                                      cp  

      Followup:                                                                               cp  

        - With: Private Physician                                                                  

        - When: 1 - 2 days                                                                         

        - Reason: Recheck today's complaints                                                       

      Discharge Instructions:                                                                     

        - Discharge Summary Sheet                                                             cp  

        - Cellulitis, Adult                                                                   cp  

      Forms:                                                                                      

        - Medication Reconciliation Form                                                      cp  

        - Thank You Letter                                                                    cp  

        - Antibiotic Education                                                                cp  

        - Prescription Opioid Use                                                             cp  

        - Patient Portal Instructions                                                         cp  

        - Leadership Thank You Letter                                                         cp  

      Prescriptions:                                                                              

        - Clindamycin HCl 300 mg Oral Capsule                                                      

            - take 1 capsule by ORAL route every 6 hours for 10 days; 40 capsule; Refills: 0, cp  

      Product Selection Permitted                                                                 

        - Ibuprofen 800 mg Oral Tablet                                                             

            - take 1 tablet by ORAL route every 8 hours As needed take with food; 30 tablet;  cp  

      Refills: 0, Product Selection Permitted                                                     

Signatures:                                                                                       

Dispatcher [employer]                           EDMS                                                 

Puneet Gresham PA PA   cp                                                   

Can García RN                      RN   Della Lucia RN                  RN   cm10                                                 

Rupinder Vargas                              RN   eh3                                                  

                                                                                                  

Corrections: (The following items were deleted from the chart)                                    

18:49  17:50 Constitutional: Positive for chills, Negative for fever, poor PO intake, cp cp  

                                                                                             

18:49  17:50 Cardiovascular: Negative for chest pain, palpitations, cp                   cp  

                                                                                             

18:49  17:50 Respiratory: Negative for cough, shortness of breath, wheezing, cp          cp  

                                                                                             

18:49  17:50 Abdomen/GI: Negative for abdominal pain, nausea, vomiting, and diarrhea, cp cp  

                                                                                             

19:43 19:42 Home cp                                                                           cp  

19:43 19:42 new cp                                                                            cp  

:43 19:42 have improved cp                                                                  cp  

: 19:42 Stable cp                                                                         cp  

: 19:42 Cellulitis of right lower limb cp                                                 cp  

                                                                                                  

**************************************************************************************************

## 2023-08-29 NOTE — ER
Nurse's Notes                                                                                     

 [facility]                                                                 

Name: Ashley Ayala                                                                             

Age: 48 yrs                                                                                       

Sex: Female                                                                                       

: 1975                                                                                   

MRN: [id number]                                                                                   

Arrival Date: 2023                                                                          

Time: 17:17                                                                                       

Account#: [id number]                                                                            

Bed 20                                                                                            

Private MD:                                                                                       

Diagnosis: Cellulitis of right lower limb                                                         

                                                                                                  

Presentation:                                                                                     

                                                                                             

17:29 Chief complaint: Patient states: right ankle surgery on Aug 7 for fractur. Pt states    cm10

      that 2 weeks ago she was seen by her doctor and placed on bactrim due to having redness     

      and pt states that the redness has not improved and she has swelling to her right foot.     

      Coronavirus screen: Vaccine status: Client denies travel out of the [place] in the last 14     

      days. Ebola Screen: Patient denies travel to an Ebola-affected area in the 21 days          

      before illness onset. No symptoms or risks identified at this time. Initial Sepsis          

      Screen: Does the patient meet any 2 criteria? No. Patient's initial sepsis screen is        

      negative. Does the patient have a suspected source of infection? Yes: Skin                  

      breakdown/wound Bone or joint infection. Risk Assessment: Do you want to hurt yourself      

      or someone else? Patient reports no desire to harm self or others. Onset of symptoms        

      was 2023.                                                                        

17:29 Method Of Arrival: Wheelchair                                                           cm10

17:29 Acuity: GURVINDER 3                                                                           cm10

                                                                                                  

Historical:                                                                                       

- Allergies:                                                                                      

17:31 Codeine;                                                                                cm10

17:31 Hydrocodone-Acetaminophen;                                                              cm10

17:31 PENICILLINS;                                                                            cm10

- PMHx:                                                                                           

17:31 Ovarian cysts;                                                                          cm10

- PSHx:                                                                                           

17:31 Cholecystectomy; tubal pregnancy;                                                       cm10

                                                                                                  

- Immunization history:: Adult Immunizations unknown.                                             

- Social history:: Smoking status: Patient reports the use of cigarette tobacco                   

  products, smokes one-half pack cigarettes per day.                                              

                                                                                                  

                                                                                                  

Screenin:19 [facility] ED Fall Risk Assessment (Adult) History of falling in the last 3 months,       kc6 

      including since admission No falls in past 3 months (0 pts) Confusion or Disorientation     

      No (0 pts) Intoxicated or Sedated No (0 pts) Impaired Gait Yes (1 pt) Mobility Assist       

      Device Used Yes (1 pt) Altered Elimination No (0 pt) Score/Fall Risk Level 0 - 2 = Low      

      Risk. Abuse screen: Denies threats or abuse. Denies injuries from another. Nutritional      

      screening: No deficits noted. Tuberculosis screening: No symptoms or risk factors           

      identified.                                                                                 

                                                                                                  

Assessment:                                                                                       

18:19 General: Appears in no apparent distress. comfortable, Behavior is calm, cooperative,   kc6 

      appropriate for age. Pain: Complains of pain in right foot and right leg. Neuro: Level      

      of Consciousness is awake, alert, obeys commands, Oriented to person, place, time,          

      situation, Appropriate for age. Cardiovascular: Capillary refill < 3 seconds.               

      Respiratory: Airway is patent Trachea midline Respiratory effort is even, unlabored,        

      Respiratory pattern is regular, symmetrical. GI: No signs and/or symptoms were reported     

      involving the gastrointestinal system. : No signs and/or symptoms were reported           

      regarding the genitourinary system. EENT: No signs and/or symptoms were reported            

      regarding the EENT system. Derm: No signs and/or symptoms reported regarding the            

      dermatologic system. Skin is intact, is healthy with good turgor, Skin is pink, warm \T\    

      dry. Musculoskeletal: No signs and/or symptoms reported regarding the musculoskeletal       

      system. Circulation, motion, and sensation intact. Capillary refill < 3 seconds, Range      

      of motion: intact in all extremities.                                                       

19:10 Reassessment: RECD REPORT FROM [facility]. 47YO WF P/W RLE PAIN/EDEMA S/P ANKLE SX.   bp  

19:43 Reassessment: DC ON HOLD FOR ARTERIOGRAM RESULT. U/S AT B/S.                            bp  

20:32 Reassessment: DC HOME VIA WC WITH FAMILY.                                               bp  

                                                                                                  

Vital Signs:                                                                                      

17:29  / 64; Pulse 72; Resp 16; Temp 97.7; Pulse Ox 98% ; Weight 92.99 kg; Height 5 ft. cm10

      5 in. ; Pain 10/10;                                                                         

18:58  / 73; Pulse 57; Resp 17 S; Pulse Ox 97% on R/A;                                  kc6 

20:32  / 78; Pulse 57; Resp 16; Pulse Ox 98% ;                                          bp  

17:29 Body Mass Index 34.11 (92.99 kg, 165.1 cm)                                              cm10

17:29 Pain Scale: Adult                                                                       cm10

                                                                                                  

ED Course:                                                                                        

17:19 Patient arrived in ED.                                                                  rg4 

17:21 Puneet Gresham PA is PHCP.                                                                cp  

17:21 Artemio Pitt MD is Attending Physician.                                                cp  

17:31 Triage completed.                                                                       cm10

17:32 Arm band placed on Patient placed in waiting room.                                      cm10

18:14 Margarita Rodríguez, RN is Primary Nurse.                                                 kc6 

18:14 Inserted saline lock: 20 gauge in right antecubital area, using aseptic technique.      kc6 

      Blood collected.                                                                            

18:15 XRAY Chest (1 view) In Process Unspecified.                                             EDMS

18:15 XRAY Foot RIGHT 3 View In Process Unspecified.                                          EDMS

18:15 XRAY Tib Fib RIGHT In Process Unspecified.                                              EDMS

18:19 Patient has correct armband on for positive identification. Bed in low position. Call   kc6 

      light in reach. Side rails up X2.                                                           

18:33 US Extremity Venous W Compression Jonathon In Process Unspecified.                           EDMS

18:51 IV discontinued, intact, bleeding controlled, No redness/swelling at site. Pressure     kc6 

      dressing applied. Inserted saline lock: 20 gauge in left antecubital area, using            

      aseptic technique.                                                                          

19:05 Report given to Can García RN.                                                      kc6 

19:07 Primary Nurse role handed off by Margarita Rodríguez RN                                  bp  

19:07 Can García, RN is Primary Nurse.                                                    bp  

20:07 US Lower Extremity Artery Uni Ltd In Process Unspecified.                               EDMS

20:33 No provider procedures requiring assistance completed.                                  bp  

                                                                                                  

Administered Medications:                                                                         

19:29 Drug: Clindamycin IVPB 900 mg Route: IVPB; Infused Over: 30 mins; Site: left            bp  

      antecubital;                                                                                

20:05 Follow up: Response: No adverse reaction; IV Status: Completed infusion; IV Intake:     eh3 

      100ml                                                                                       

19:45 Drug: fentaNYL (PF) IVP 25 mcg Route: IVP; Site: left antecubital;                      bp  

20:33 Follow up: Response: No adverse reaction                                                bp  

                                                                                                  

                                                                                                  

Intake:                                                                                           

20:05 IV: 100ml; Total: 100ml.                                                                eh3 

                                                                                                  

Outcome:                                                                                          

19:42 Discharge ordered by MD.                                                                cp  

19:53 Discharge ordered by MD.                                                                cp  

20:33 Discharged to home via wheelchair, with family.                                         bp  

20:33 Condition: stable                                                                           

20:33 Discharge instructions given to patient, family, Instructed on discharge instructions,      

      follow up and referral plans. medication usage, Demonstrated understanding of               

      instructions, follow-up care, medications, Prescriptions given X 2.                         

20:33 Patient left the ED.                                                                    bp  

                                                                                                  

Signatures:                                                                                       

Dispatcher [employer]                           EDMS                                                 

Puneet Gresham PA PA cp Garcia, Rubi                                 rg4                                                  

Can García, RN                      RN   bp                                                   

Rupinder Vargas, RN                          RN   eh3                                                  

Margarita Rodríguez, RN                   RN   kc6                                                  

Della Lang, RN                  RN   cm10                                                 

                                                                                                  

**************************************************************************************************

## 2023-08-29 NOTE — RAD REPORT
EXAM DESCRIPTION:  RAD - Chest Single View - 8/29/2023 6:13 pm

 

CLINICAL HISTORY:  swelling of feet

Chest pain.

 

COMPARISON:  Chest Single View dated 7/22/2023; Chest Single View dated 2/16/2022; Chest Single View 
dated 10/8/2021; Chest Single View dated 9/11/2018

 

FINDINGS:  Portable technique limits examination quality.

 

The lungs are grossly clear. The heart is normal in size. No displaced fractures.

 

IMPRESSION:  No acute intrathoracic process suspected.

## 2023-08-30 NOTE — EKG
Test Date:    2023-08-29               Test Time:    18:05:28

Technician:   GISELA                                    

                                                     

MEASUREMENT RESULTS:                                       

Intervals:                                           

Rate:         59                                     

NJ:           146                                    

QRSD:         82                                     

QT:           428                                    

QTc:          423                                    

Axis:                                                

P:            62                                     

NJ:           146                                    

QRS:          31                                     

T:            41                                     

                                                     

INTERPRETIVE STATEMENTS:                                       

                                                     

Sinus bradycardia

Otherwise normal ECG

Compared to ECG 07/22/2023 04:22:14

Sinus rhythm no longer present



Electronically Signed On 08-30-23 13:03:33 CDT by Stanley Feliz

## 2023-10-20 ENCOUNTER — HOSPITAL ENCOUNTER (EMERGENCY)
Dept: HOSPITAL 97 - ER | Age: 48
Discharge: HOME | End: 2023-10-20
Payer: COMMERCIAL

## 2023-10-20 VITALS — SYSTOLIC BLOOD PRESSURE: 107 MMHG | DIASTOLIC BLOOD PRESSURE: 50 MMHG

## 2023-10-20 VITALS — TEMPERATURE: 97.1 F | OXYGEN SATURATION: 100 %

## 2023-10-20 DIAGNOSIS — Z88.0: ICD-10-CM

## 2023-10-20 DIAGNOSIS — K51.50: Primary | ICD-10-CM

## 2023-10-20 DIAGNOSIS — Z88.5: ICD-10-CM

## 2023-10-20 LAB
ALBUMIN SERPL BCP-MCNC: 3.5 G/DL (ref 3.4–5)
ALP SERPL-CCNC: 112 U/L (ref 45–117)
ALT SERPL W P-5'-P-CCNC: 17 U/L (ref 13–56)
AST SERPL W P-5'-P-CCNC: 12 U/L (ref 15–37)
BUN BLD-MCNC: 8 MG/DL (ref 7–18)
GLUCOSE SERPLBLD-MCNC: 96 MG/DL (ref 74–106)
HCT VFR BLD CALC: 38.3 % (ref 36–45)
LIPASE SERPL-CCNC: 16 U/L (ref 13–75)
LYMPHOCYTES # SPEC AUTO: 1.5 K/UL (ref 0.7–4.9)
MCV RBC: 88.8 FL (ref 80–100)
PMV BLD: 8.1 FL (ref 7.6–11.3)
POTASSIUM SERPL-SCNC: 3.5 MEQ/L (ref 3.5–5.1)
RBC # BLD: 4.32 M/UL (ref 3.86–4.86)
WBC # BLD AUTO: 6.8 THOU/UL (ref 4.3–10.9)

## 2023-10-20 PROCEDURE — 74177 CT ABD & PELVIS W/CONTRAST: CPT

## 2023-10-20 PROCEDURE — 99284 EMERGENCY DEPT VISIT MOD MDM: CPT

## 2023-10-20 PROCEDURE — 96374 THER/PROPH/DIAG INJ IV PUSH: CPT

## 2023-10-20 PROCEDURE — 80053 COMPREHEN METABOLIC PANEL: CPT

## 2023-10-20 PROCEDURE — 36415 COLL VENOUS BLD VENIPUNCTURE: CPT

## 2023-10-20 PROCEDURE — 83690 ASSAY OF LIPASE: CPT

## 2023-10-20 PROCEDURE — 96375 TX/PRO/DX INJ NEW DRUG ADDON: CPT

## 2023-10-20 PROCEDURE — 84703 CHORIONIC GONADOTROPIN ASSAY: CPT

## 2023-10-20 PROCEDURE — 85025 COMPLETE CBC W/AUTO DIFF WBC: CPT

## 2023-10-20 NOTE — XMS REPORT
Continuity of Care Document

                           Created on:2023



Patient:HOMER AYALA

Sex:Female

:1975

External Reference #:[id number]





Demographics







                          Address                   [address]  



                                                    [place] [id number]

 

                          Home Phone                [phone]

 

                          Mobile Phone              [phone] )

 

                          Email Address             [email or web address]

 

                          Preferred Language        Unknown

 

                          Marital Status            Unknown

 

                          Caodaism Affiliation     Unknown

 

                          Race                      Unknown

 

                          Additional Race(s)        Unavailable



                                                    White

 

                          Ethnic Group              Unknown









Author







                          Organization              [facility]

t

 

                          Address                   [address] [place] [address]. [address]



                                                    [place] [address]

 

                          Phone                     [phone]









Support







                Name            Relationship    Address         Phone

 

                YASMIN AYALA Relative        Unavailable     [phone]

 

                Val Ayala  Mother          [address]  [phone]

[phone]



                                                [place] [address] 

 

                YASMIN AYALA [place]              [address]   [phone]

[phone]



                                                [place] [address] 









Care Team Providers







                    Name                Role                Phone

 

                    José Antonio Lr Jeffrey Primary Care Physician [phone]

 

                    ALBERT HIGGINS Attending Clinician Unavailable

 

                    Sandifer PA, Heather Attending Clinician [phone]

 

                    Cisco Ge   Attending Clinician [phone]

 

                    ALBERT HIGGINS Attending Clinician Unavailable

 

                    GONZÁLEZ BARROW  Attending Clinician Unavailable

 

                    Suzan Tovar LCSW Attending Clinician [phone]

 

                    Ken Padilla MD   Attending Clinician [phone]

 

                    KEN PADILLA      Attending Clinician Unavailable

 

                    Doctor Unassigned, No Name Attending Clinician Unavailable

 

                    Shelley Draper RN     Attending Clinician Unavailable

 

                    CAMILO RASMUSSEN     Attending Clinician Unavailable

 

                    Yoandy ASHTON MD, Chilvana Attending Clinician [phone]

 

                    Giselle Renee Attending Clinician [phone]

 

                    BRIT LANGE Attending Clinician Unavailable

 

                    Visit, [facility] Nurse Attending Clinician Unavailable

 

                    Honey Moreno Attending Clinician [phone]

 

                    Brit Feliz Attending Clinician [phone]

[phone]

 

                    KEN PADILLA      Admitting Clinician Unavailable









Payers







           Payer Name Policy Type Policy Number Effective Date Expiration Date S

bienvenido

 

           [employer]IC OTHER            [id number] 2023 



                                            00:00:00   00:00:00   

 

           JEREMÍAS TERRAZAS -            [id number] 2023            



           [employer]ET[employer]                            00:00:00              

 

           [employer]            [id number]                        



           HEALTHCARE                                             







Problems







       Condition Condition Condition Status Onset  Resolution Last   Treating Co

mments 

Source



       Name   Details Category        Date   Date   Treatment Clinician        



                                                 Date                 

 

       Displaced Displaced Disease Active                              [facility]



       fracture fracture               8-15                               Health



       of lateral of lateral               00:00:                             



       malleolus malleolus               00                                 



       of right of right                                                  



       fibula, fibula,                                                  



       initial initial                                                  



       encounter encounter                                                  



       for closed for closed                                                  



       fracture fracture                                                  

 

       Displaced Displaced Disease Active                              [facility]



       bimalleola bimalleola               8-15                               He

alth



       r fracture r fracture               00:00:                             



       of right of right               00                                 



       lower leg, lower leg,                                                  



       initial initial                                                  



       encounter encounter                                                  



       for open for open                                                  



       fracture fracture                                                  



       type I or type I or                                                  



       II     II                                                      

 

       Cervical Cervical Disease Active                              Unive

rs



       stenosis stenosis               2-16                               ity of



       of spine of spine               00:00:                             [place]



                                   00                                 Medical



                                                                      Branch

 

       Abnormalit Abnormalit Disease Active                              U

nivers



       y of right y of right               604                               it

y of



       breast on breast on               00:00:                             [place]

[place]



       screening screening               00                                 Medi

allyson



       mammogram mammogram                                                  [facility]

ch

 

       Right  Right  Disease Active                       Overview: Univer

s



       ovarian ovarian               -                        Formattin ity o

f



       cyst   cyst                 00:00:                      g of this [place]



                                   00                          note   Medical



                                                               might be Branch



                                                               different 



                                                               from the 



                                                               original. 



                                                               1.8 x 1.8 



                                                               x 2.1 cm 



                                                               right  



                                                               ovarian 



                                                               hemorrhag 



                                                               ic cyst 



                                                               on 19 



                                                               pelvic US 

 

       Intramural Intramural Disease Active                       Overview

: Univers



       leiomyoma leiomyoma               -                        Formattin i

ty of



       of uterus of uterus               00:00:                      g of this [facility]

[place]



                                   00                          note   Medical



                                                               might be Branch



                                                               different 



                                                               from the 



                                                               original. 



                                                               1 x 0.8 



                                                               cm on  



                                                               19 



                                                               pelvic US 

 

       Screen for Screen for Disease Active                              U

nivers



       STD    STD                  5-29                               ity of



       (sexually (sexually               00:00:                             [place]

[place]



       transmitte transmitte               00                                 Me

dical



       d disease) d disease)                                                  Br

anch

 

       BMI    BMI    Disease Active                              Univers



       31.0-31.9, 31.0-31.9,               5-29                               it

y of



       adult  adult                00:00:                             [place]



                                   00                                 Medical



                                                                      Branch

 

       Positive Positive Disease Active                              Unive

rs



       depression depression                                              it

y of



       screening screening               00:00:                             [place]

[facility]



                                   00                                 Medical



                                                                      Branch

 

       S/P tubal S/P tubal Disease Active                              Uni

vers



       ligation ligation                                              ity of



                                   00:00:                             [place]



                                   00                                 Medical



                                                                      Branch

 

       Trichomona Trichomona Disease Active                              U

nivers



       l      l                                                   ity of



       vulvovagin vulvovagin               00:00:                             [place]

[place]



       itis   itis                                                  Medical



                                                                      Branch

 

       Menorrhagi Menorrhagi Disease Active                              U

nivers



       a with a with                                              ity of



       regular regular               00:00:                             [place]



       cycle  cycle                                                 Medical



                                                                      Branch

 

       Enlarged Enlarged Disease Active                              Unive

rs



       uterus uterus                                              ity of



                                   00:00:                             [place]



                                                                    Medical



                                                                      Branch

 

       S/P tubal S/P tubal Disease Active                              Uni

vers



       ligation ligation                                              ity of



                                   00:00:                             [place]



                                                                    Medical



                                                                      Branch

 

       Menorrhagi Menorrhagi Disease Active                              U

nivers



       a with a with                                              ity of



       regular regular               00:00:                             [place]



       cycle  cycle                                                 Medical



                                                                      Branch







Allergies, Adverse Reactions, Alerts







       Allergy Allergy Status Severity Reaction(s) Onset  Inactive Treating Comm

ents 

Source



       Name   Type                        Date   Date   Clinician        

 

       Penicill Drug   Active        Hives  -                      Univers



       in     Allergy                                              ity of



                                          00:00:                      [place]



                                                                    Medical



                                                                      Branch

 

       PENICILL DRUG   Active Med    Hives                        Univers



       IN     INGREDI                                              ity of



                                          00:00:                      [place]



                                          00                          Medical



                                                                      Branch

 

       [facility] Drug   Active        Hives                        [facility]



       in     Allergy                                              ity of



                                          00:00:                      [place]



                                                                    Medical



                                                                      Branch

 

       Penicill Allergy Active        Rash   2018                      [facility]



       ins    to                          0-30                        Health



              substanc                      00:00:                      



              e                           00                          

 

       Amoxicil Propensi Active        Hives                        Univer

s



       frandy    ty to                       1-23                        ity of



              adverse                      00:00:                      [place]



              reaction                      00                          Medical



              s                                                       Branch

 

       AMOXICIL DRUG   Active        Hives  2017                      Univers



       FRANDY    INGREDI                      -23                        ity of



                                          00:00:                      [place]



                                          00                          Medical



                                                                      Branch

 

       Hydrocod Propensi Active        Nausea 2016                      Univer

s



       one-Acet ty to                and/or 2-21                        ity of



       aminophe adverse               Vomiting 00:00:                      [place]



       n      reaction                      00                          Medical



              s                                                       Branch

 

       HYDROCOD DRUG   Active        N/V    2016                      Univers



       ONE-ACET                             2-21                        ity of



       AMINOPHE                             00:00:                      [place]



       N                                  00                          Medical



                                                                      Branch

 

       Codeine Allergy Active        Rash   -                      [facility]



              to                                                  Health



              substanc                      00:00:                      



              e                           00                          

 

       Codeine Propensi Active        Rash   -                      Univers



              ty to                                               ity of



              adverse                      00:00:                      [place]



              reaction                      00                          Medical



              s                                                       Branch

 

       CODEINE DRUG   Active        Rash   -                      Univers



              INGREDI                                              ity of



                                          00:00:                      [place]



                                          00                          Medical



                                                                      Branch







Social History







           Social Habit Start Date Stop Date  Quantity   Comments   Source

 

           History of tobacco 1999            Cigarette Smoker            

[facility] of



           use        00:00:00                                    [facility]

 

           Sexual orientation                                             Univer

sity of



                                                                  [facility]

 

           History SDOH                                             University o

f



           Alcohol Frequency                                             [facility]

edical



                                                                  Branch

 

           History [facility]                                             University o

f



           Alcohol Std Drinks                                             [place] 

Medical



                                                                  Branch

 

           History [facility]

f



           Alcohol Binge                                             [place] [facility]

al



                                                                  Branch

 

           Exposure to 2023 Not sure              University of



           SARS-CoV-2 (event) 00:00:00   09:01:00                         [facility]

 

           Alcohol intake 2022 Current drinker            Unive

rsity of



                      00:00:00   00:00:00   of alcohol            [facility]



                                            (finding)             Branch

 

           History of Social 2020                       Univers

ity of



           function   00:00:00   00:00:00                         [facility]

 

           Cigarettes smoked 2019                       Univers

ity of



           current (pack per 00:00:00   00:00:00                         [facility]

edical



           day) - Reported                                             Branch

 

           Alcohol Comment 2019 ocassional            Universit

y of



                      00:00:00   00:00:00                         [facility]

 

           Tobacco use and 2019 Smokeless tobacco            Un

iversity of



           exposure   00:00:00   00:00:00   non-user              [facility]

 

           Sex Assigned At 1975                       [facility]



           Birth      00:00:00   00:00:00                         









                Smoking Status  Start Date      Stop Date       Source

 

                Tobacco smoking consumption                                 [facility]

[facility]



                unknown                                         

 

                Smokes tobacco daily 2019 00:00:00                 Univers

ity of [facility]







Medications







       Ordered Filled Start  Stop   Current Ordering Indication Dosage Frequency

 Signature

                    Comments            Components          Source



     Medication Medication Date Date Medication? Clinician                (SIG) 

          



     Name Name                                                   

 

     methocarbam      2023- Yes       [id number] 500mg      Take 1         

  [facility]



     ol        0-12 11-02                          tablet           Health



     (Robaxin)      00:00: 04:59                          (500 mg           



     500 MG      00   :00                           total) by           



     tablet                                         mouth 1           



                                                  (one) time           



                                                  each day           



                                                  if needed           



                                                  for muscle           



                                                  spasms for           



                                                  up to 20           



                                                  days.           

 

     methocarbam      2023- Yes       [id number] 500mg Q.25D Take 1        

   [facility]



     ol        0-12 10-23                          tablet           Health



     (Robaxin)      00:00: 04:59                          (500 mg           



     500 MG      00   :00                           total) by           



     tablet                                         mouth in           



                                                  the            



                                                  morning           



                                                  and 1           



                                                  tablet           



                                                  (500 mg           



                                                  total) at           



                                                  noon and 1           



                                                  tablet           



                                                  (500 mg           



                                                  total) in           



                                                  the            



                                                  evening           



                                                  and 1           



                                                  tablet           



                                                  (500 mg           



                                                  total)           



                                                  before           



                                                  bedtime.           



                                                  Do all           



                                                  this for           



                                                  10 days.           

 

     gabapentin      0      Yes       [id number] 300mg [id number] Take 1     

      [facility]



     (Neurontin)      -                          [id number] capsule          

 Health



     300 MG      00:00:                          3D   (300 mg           



     capsule      00                                 total) by           



                                                  mouth in           



                                                  the            



                                                  morning           



                                                  and 1           



                                                  capsule           



                                                  (300 mg           



                                                  total) at           



                                                  noon and 1           



                                                  capsule           



                                                  (300 mg           



                                                  total) in           



                                                  the            



                                                  evening.           

 

     gabapentin      2023- Yes       [id number] 300mg Q.[id number] Take 1    

       [facility]



     (Neurontin)      9 10-                     [id number] capsule         

  [facility]



     300 MG      00:00: 04:59                     3D   (300 mg           



     capsule      00   :00                           total) by           



                                                  mouth in           



                                                  the            



                                                  morning           



                                                  and 1           



                                                  capsule           



                                                  (300 mg           



                                                  total) at           



                                                  noon and 1           



                                                  capsule           



                                                  (300 mg           



                                                  total) in           



                                                  the            



                                                  evening.           

 

     methocarbam      0      Yes       [id number] 500mg      Take 1          

 [facility]



     ol        8-25                               tablet           [facility]



     (Robaxin)      00:00:                               (500 mg           



     500 MG      00                                 total) by           



     tablet                                         mouth 1           



                                                  (one) time           



                                                  each day           



                                                  if needed           



                                                  for muscle           



                                                  spasms for           



                                                  up to 20           



                                                  days.           

 

     methocarbam      0      Yes       [id number] 500mg      Take 1          

 [facility]



     ol        8-25                               tablet           [facility]



     (Robaxin)      00:00:                               (500 mg           



     500 MG      00                                 total) by           



     tablet                                         mouth 1           



                                                  (one) time           



                                                  each day           



                                                  if needed           



                                                  for muscle           



                                                  spasms for           



                                                  up to 20           



                                                  days.           

 

     methocarbam      0 - Yes       [id number] 500mg      Take 1         

  [facility]



     ol        8-25 09-15                          tablet           [facility]



     (Robaxin)      00:00: 04:59                          (500 mg           



     500 MG      00   :00                           total) by           



     tablet                                         mouth 1           



                                                  (one) time           



                                                  each day           



                                                  if needed           



                                                  for muscle           



                                                  spasms for           



                                                  up to 20           



                                                  days.           

 

     sulfamethox      -0 - Yes       [id number] 1{tbl} Q.5D Take 1        

   [facility]



     azole-trime      -                          tablet by           [facility]

[facility]



     thoprim      00:00: 04:59                          mouth in           



     (Bactrim      00   :00                           the            



     DS) 800-160                                         morning           



     MG tablet                                         and 1           



                                                  tablet in           



                                                  the            



                                                  evening.           



                                                  Do all           



                                                  this for 7           



                                                  days.           

 

     sulfamethox      0 - Yes       [id number] 1{tbl} Q.5D Take 1        

   [facility]



     azole-trime                                tablet by           [facility]

[facility]



     thoprim      00:00: 04:59                          mouth in           



     (Bactrim      00   :00                           the            



     DS) 800-160                                         morning           



     MG tablet                                         and 1           



                                                  tablet in           



                                                  the            



                                                  evening.           



                                                  Do all           



                                                  this for 7           



                                                  days.           

 

     traMADol      -0      Yes       [id number] 50mg Q6H  Take 1           [facility]



     (Ultram) 50      8-16                               tablet (50           [facility]

[facility]



     MG tablet      00:00:                               mg total)           



               00                                 by mouth           



                                                  every 6           



                                                  (six)           



                                                  hours if           



                                                  needed for           



                                                  severe           



                                                  pain.           

 

     methocarbam      -0      Yes       [id number] 500mg      Take 1          

 [facility]



     ol        8-16                               tablet           [facility]



     (Robaxin)      00:00:                               (500 mg           



     500 MG      00                                 total) by           



     tablet                                         mouth           



                                                  every 8           



                                                  (eight)           



                                                  hours if           



                                                  needed for           



                                                  muscle           



                                                  spasms for           



                                                  up to 10           



                                                  days.           

 

     traMADol      -0      Yes       [id number] 50mg Q6H  Take 1           [facility]



     (Ultram) 50      8-16                               tablet (50           [facility]

[facility]



     MG tablet      00:00:                               mg total)           



               00                                 by mouth           



                                                  every 6           



                                                  (six)           



                                                  hours if           



                                                  needed for           



                                                  severe           



                                                  pain.           

 

     methocarbam      -0      Yes       [id number] 500mg      Take 1          

 [facility]



     ol        8-16                               tablet           [facility]



     (Robaxin)      00:00:                               (500 mg           



     500 MG      00                                 total) by           



     tablet                                         mouth           



                                                  every 8           



                                                  (eight)           



                                                  hours if           



                                                  needed for           



                                                  muscle           



                                                  spasms for           



                                                  up to 10           



                                                  days.           

 

     methocarbam      -0      Yes       [id number] 500mg      Take 1          

 [facility]



     ol        8-16                               tablet           [facility]



     (Robaxin)      00:00:                               (500 mg           



     500 MG      00                                 total) by           



     tablet                                         mouth           



                                                  every 8           



                                                  (eight)           



                                                  hours if           



                                                  needed for           



                                                  muscle           



                                                  spasms for           



                                                  up to 10           



                                                  days.           

 

     methocarbam      -0 - Yes       [id number] 500mg      Take 1         

  [facility]



     ol        8-16 08-27                          tablet           [facility]



     (Robaxin)      00:00: 04:59                          (500 mg           



     500 MG      00   :00                           total) by           



     tablet                                         mouth           



                                                  every 8           



                                                  (eight)           



                                                  hours if           



                                                  needed for           



                                                  muscle           



                                                  spasms for           



                                                  up to 10           



                                                  days.           

 

     sulfamethox      2023- Yes       [id number] 1{tbl} Q.5D Take 1        

   [facility]



     azole-trime                                tablet by           [facility]

[facility]



     thoprim      00:00: 04:59                          mouth in           



     (Bactrim      00   :00                           the            



     DS) 800-160                                         morning           



     MG tablet                                         and 1           



                                                  tablet in           



                                                  the            



                                                  evening.           



                                                  Do all           



                                                  this for 7           



                                                  days.           

 

     oxyCODONE      2023- No        [id number] 5mg  Q6H  Take 1           [facility]

[facility]



     (Roxicodone                                tablet (5           [facility]

[facility]



     ) 5 MG      00:00: 04:59                          mg total)           



     immediate      00   :00                           by mouth           



     release                                         every 6           



     tablet                                         (six)           



                                                  hours if           



                                                  needed for           



                                                  severe           



                                                  pain for           



                                                  up to 6           



                                                  days.           

 

     gabapentin      2023- Yes       [id number] 300mg Q.[id number] Take 1    

       [facility]



     (Neurontin)                           [id number] capsule         

  [facility]



     300 MG      00:00: 04:59                     3D   (300 mg           



     capsule      00   :00                           total) by           



                                                  mouth in           



                                                  the            



                                                  morning           



                                                  and 1           



                                                  capsule           



                                                  (300 mg           



                                                  total) at           



                                                  noon and 1           



                                                  capsule           



                                                  (300 mg           



                                                  total) in           



                                                  the            



                                                  evening.           

 

     gabapentin      2023- Yes       [id number] 300mg Q.[id number] Take 1    

       [facility]



     (Neurontin)                           [id number] capsule         

  [facility]



     300 MG      00:00: 04:59                     3D   (300 mg           



     capsule      00   :00                           total) by           



                                                  mouth in           



                                                  the            



                                                  morning           



                                                  and 1           



                                                  capsule           



                                                  (300 mg           



                                                  total) at           



                                                  noon and 1           



                                                  capsule           



                                                  (300 mg           



                                                  total) in           



                                                  the            



                                                  evening.           

 

     gabapentin      2023- Yes       [id number] 300mg Q.[id number] Take 1    

       [facility]



     (Neurontin)                           [id number] capsule         

  [facility]



     300 MG      00:00: 04:59                     3D   (300 mg           



     capsule      00   :00                           total) by           



                                                  mouth in           



                                                  the            



                                                  morning           



                                                  and 1           



                                                  capsule           



                                                  (300 mg           



                                                  total) at           



                                                  noon and 1           



                                                  capsule           



                                                  (300 mg           



                                                  total) in           



                                                  the            



                                                  evening.           

 

     methocarbam      2023- No        [id number] 500mg      Take 1         

  [facility]



     ol                                  tablet           [facility]



     (Robaxin)      00:00: 00:00                          (500 mg           



     500 MG      00   :00                           total) by           



     tablet                                         mouth 1           



                                                  (one) time           



                                                  each day           



                                                  if needed           



                                                  for muscle           



                                                  spasms for           



                                                  up to 20           



                                                  days.           

 

     methocarbam      2023-0      Yes       [id number] 500mg Q.25D Take 1         

  [facility]



     ol        7-27                               tablet           [facility]



     (Robaxin)      00:00:                               (500 mg           



     500 MG      00                                 total) by           



     tablet                                         mouth in           



                                                  the            



                                                  morning           



                                                  and 1           



                                                  tablet           



                                                  (500 mg           



                                                  total) at           



                                                  noon and 1           



                                                  tablet           



                                                  (500 mg           



                                                  total) in           



                                                  the            



                                                  evening           



                                                  and 1           



                                                  tablet           



                                                  (500 mg           



                                                  total)           



                                                  before           



                                                  bedtime.           



                                                  Do all           



                                                  this for           



                                                  10 days.           

 

     methocarbam      2023-0      Yes       [id number] 500mg Q.25D Take 1         

  [facility]



     ol        7-27                               tablet           [facility]



     (Robaxin)      00:00:                               (500 mg           



     500 MG      00                                 total) by           



     tablet                                         mouth in           



                                                  the            



                                                  morning           



                                                  and 1           



                                                  tablet           



                                                  (500 mg           



                                                  total) at           



                                                  noon and 1           



                                                  tablet           



                                                  (500 mg           



                                                  total) in           



                                                  the            



                                                  evening           



                                                  and 1           



                                                  tablet           



                                                  (500 mg           



                                                  total)           



                                                  before           



                                                  bedtime.           



                                                  Do all           



                                                  this for           



                                                  10 days.           

 

     methocarbam      2023-0      Yes       [id number] 500mg Q.25D Take 1         

  [facility]



     ol        7-27                               tablet           [facility]



     (Robaxin)      00:00:                               (500 mg           



     500 MG      00                                 total) by           



     tablet                                         mouth in           



                                                  the            



                                                  morning           



                                                  and 1           



                                                  tablet           



                                                  (500 mg           



                                                  total) at           



                                                  noon and 1           



                                                  tablet           



                                                  (500 mg           



                                                  total) in           



                                                  the            



                                                  evening           



                                                  and 1           



                                                  tablet           



                                                  (500 mg           



                                                  total)           



                                                  before           



                                                  bedtime.           



                                                  Do all           



                                                  this for           



                                                  10 days.           

 

     methocarbam      2023-0      Yes       [id number] 500mg Q.25D Take 1         

  [facility]



     ol        7-27                               tablet           [facility]



     (Robaxin)      00:00:                               (500 mg           



     500 MG      00                                 total) by           



     tablet                                         mouth in           



                                                  the            



                                                  morning           



                                                  and 1           



                                                  tablet           



                                                  (500 mg           



                                                  total) at           



                                                  noon and 1           



                                                  tablet           



                                                  (500 mg           



                                                  total) in           



                                                  the            



                                                  evening           



                                                  and 1           



                                                  tablet           



                                                  (500 mg           



                                                  total)           



                                                  before           



                                                  bedtime.           



                                                  Do all           



                                                  this for           



                                                  10 days.           

 

     methocarbam      2023-0 2023- No        [id number] 500mg Q.25D Take 1        

   [facility]



     ol        7-27 10-12                          tablet           [facility]



     (Robaxin)      00:00: 00:00                          (500 mg           



     500 MG      00   :00                           total) by           



     tablet                                         mouth in           



                                                  the            



                                                  morning           



                                                  and 1           



                                                  tablet           



                                                  (500 mg           



                                                  total) at           



                                                  noon and 1           



                                                  tablet           



                                                  (500 mg           



                                                  total) in           



                                                  the            



                                                  evening           



                                                  and 1           



                                                  tablet           



                                                  (500 mg           



                                                  total)           



                                                  before           



                                                  bedtime.           



                                                  Do all           



                                                  this for           



                                                  10 days.           

 

     gabapentin      2023-0      Yes       [id number] 300mg      Take 1           

[facility]



     300 mg      3-07                               capsule by           ity of



     capsule      00:00:                               mouth 3           [place]



               00                                 (three)           Medical



                                                  times           [place]



                                                  daily as           



                                                  needed for           



                                                  Pain           



                                                  (scale           



                                                  7-10).           

 

     gabapentin      0      Yes       [id number] 300mg      Take 1           

[facility]



     300 mg      3-07                               capsule by           ity of



     capsule      00:00:                               mouth 3           [place]



                                                (three)           Medical



                                                  times           [place]



                                                  daily as           



                                                  needed for           



                                                  Pain           



                                                  (scale           



                                                  7-10).           

 

     FENTanyl PF      -0 - No             25ug      25 mcg,           Un

[facility]



     (SUBLIMAZE      -                          Slow IV           ity o

f



     (PF))      17:30: 16:24                          Push,           [place]



     injection      00   :00                           ONCE, 1           Medical



     25 mcg                                         dose, On           [facility]



                                                  Thu            



                                                  22 at           



                                                  1130,           



                                                  Routine           

 

     famotidine      -0      Yes            20mg      20 mg,           Un[facility]

rs



     (PEPCID AC)      2-17                               Oral, BID,           it

y of



     tablet 20      14:00:                               First dose           [place]

[place]



     mg        00                                 on [facility]



                                                  22 at           [facility]



                                                  0800,           



                                                  Until           



                                                  Discontinu           



                                                  ed,            



                                                  Routine           

 

     docusate            Yes            100mg      100 mg,           Un[facility]

rs



     (COLACE)      2-17                               Oral, BID,           ity o

f



     capsule 100      14:00:                               First dose           

[place]



     mg        00                                 on [facility]



                                                  22 at           [facility]



                                                  0800,           



                                                  Until           



                                                  Discontinu           



                                                  ed,            



                                                  Routine           

 

     gabapentin            Yes            300mg      300 mg,           Uni

vers



     (NEURONTIN)      -17                               Oral, TID,           it

y of



     capsule 300      14:00:                               First dose           

[place]



     mg        00                                 on [facility]



                                                  22 at           [facility]



                                                  0800,           



                                                  Until           



                                                  Discontinu           



                                                  ed,            



                                                  Routine           

 

     traMADoL      0      Yes            50mg      50 mg,           [facility]



     (ULTRAM)      17                               Oral,           ity of



     tablet 50      03:35:                               Q6HPRN,           [place]



     mg        17                                 Starting           Medical



                                                  on Wed           [place]



                                                  22 at           



                                                  2135,           



                                                  Until           



                                                  Discontinu           



                                                  ed,            



                                                  Routine,           



                                                  Pain           



                                                  (scale           



                                                  4-6)           

 

     acetaminoph      -0      Yes            325mg      325 mg,           Un

[facility]



     en        -17                               Oral,           ity of



     (TYLENOL)      03:35:                               Q6HPRN,           [place]



     tablet 325      11                                 Starting           Medic

al



     mg                                           on Wed           [place]



                                                  22 at           



                                                  2135,           



                                                  Until           



                                                  Discontinu           



                                                  ed,            



                                                  Routine,           



                                                  Pain           



                                                  (scale           



                                                  1-3)           

 

     bisacodyL      0      Yes            10mg      10 mg,           [facility]

s



     (DULCOLAX)      2-17                               Rectal,           ity of



     suppository      03:32:                               QHSPRN,           [place]

as



     10 mg      01                                 Starting           Medical



                                                  on Wed           [facility]



                                                  22 at           



                                                  2132,           



                                                  Until           



                                                  Discontinu           



                                                  ed,            



                                                  Routine,           



                                                  Constipati           



                                                  on             

 

     NaCl 0.9%      -0      Yes            5mL       5 mL, Slow           Un

[facility]



     (NS)      2-17                               IV Push,           ity of



     injection 5      03:32:                               PRN - SEE           [facility]

[place]



     mL        01                                 INSTRUCTIO           Medical



                                                  NS,            [facility]



                                                  Starting           



                                                  on 22 at           



                                                  2132,           



                                                  Until           



                                                  Discontinu           



                                                  ed, 10 mL           

 

     methylPREDN      -0      Yes       [id number]           Take by          

 [facility]



     ISolone 4      2-17                               mouth           ity of



     mg tablets      00:00:                               SEE-INSTRU           T

[place]



               00                                 CTIONS.           Medical



                                                  follow           [facility]



                                                  package           



                                                  directions           

 

     methylPREDN      -0      Yes       [id number]           Take by          

 [facility]



     ISolone 4      2-17                               mouth           ity of



     mg tablets      00:00:                               SEE-INSTRU           T

[place]



               00                                 CTIONS.           Medical



                                                  follow           [facility]



                                                  package           



                                                  directions           

 

     methylPREDN      -0      Yes       [id number]           Take by          

 [facility]



     ISolone 4      2-17                               mouth           ity of



     mg tablets      00:00:                               SEE-INSTRU           T

[place]



               00                                 CTIONS.           Medical



                                                  follow           [facility]



                                                  package           



                                                  directions           

 

     methylPREDN      2-0      Yes       [id number]           Take by          

 [facility]



     ISolone 4      2-17                               mouth           ity of



     mg tablets      00:00:                               SEE-INSTRU           T

[place]



               00                                 CTIONS.           Medical



                                                  follow           [facility]



                                                  package           



                                                  directions           

 

     methylPREDN      2-0      Yes       [id number]           Take by          

 [facility]



     ISolone 4      2-17                               mouth           ity of



     mg tablets      00:00:                               SEE-INSTRU           T

[place]



               00                                 CTIONS.           Medical



                                                  follow           [facility]



                                                  package           



                                                  directions           

 

     methylPREDN      2-0      Yes       [id number]           Take by          

 [facility]



     ISolone 4      2-17                               mouth           ity of



     mg tablets      00:00:                               SEE-INSTRU           T

[place]



               00                                 CTIONS.           Medical



                                                  follow           [facility]



                                                  package           



                                                  directions           

 

     methylPREDN      2022-0      Yes       [id number]           Take by          

 [facility]



     ISolone 4      2-17                               mouth           ity of



     mg tablets      00:00:                               SEE-INSTRU           T

[facility]



               00                                 CTIONS.           Medical



                                                  follow           [facility]



                                                  package           



                                                  directions           

 

     methylPREDN      2022-0      Yes       [id number]           Take by          

 [facility]



     ISolone 4      2-17                               mouth           ity of



     mg tablets      00:00:                               SEE-INSTRU           T

[place]



               00                                 CTIONS.           Medical



                                                  follow           [facility]



                                                  package           



                                                  directions           

 

     methylPREDN      2022-0      Yes       [id number]           Take by          

 [facility]



     ISolone 4      2-17                               mouth           ity of



     mg tablets      00:00:                               SEE-INSTRU           T

[place]



               00                                 CTIONS.           Medical



                                                  follow           [facility]



                                                  package           



                                                  directions           

 

     methylPREDN      -0      Yes       [id number]           Take by          

 [facility]



     ISolone 4      2-17                               mouth           ity of



     mg tablets      00:00:                               SEE-INSTRU           T

[place]



               00                                 CTIONS.           Medical



                                                  follow           [facility]



                                                  package           



                                                  directions           

 

     methylPREDN      -0      Yes       [id number]           Take by          

 [facility]



     ISolone 4      2-17                               mouth           ity of



     mg tablets      00:00:                               SEE-INSTRU           T

[place]



               00                                 CTIONS.           Medical



                                                  follow           [facility]



                                                  package           



                                                  directions           

 

     methylPREDN      -0      Yes       [id number]           Take by          

 [facility]



     ISolone 4      2-17                               mouth           ity of



     mg tablets      00:00:                               SEE-INSTRU           T

[facility]



               00                                 CTIONS.           Medical



                                                  follow           [facility]



                                                  package           



                                                  directions           

 

     methylPREDN      -0      Yes       [id number]           Take by          

 [facility]



     ISolone 4      2-17                               mouth           ity of



     mg tablets      00:00:                               SEE-INSTRU           T

[place]



               00                                 CTIONS.           Medical



                                                  follow           [facility]



                                                  package           



                                                  directions           

 

     gabapentin      2022- No        [id number] 300mg      Take 1          

 [facility]



     300 mg      2-17 03-20                          capsule by           ity of



     capsule      00:00: 04:59                          mouth 3           [place]



               00   :00                           (three)           Medical



                                                  times           [facility]



                                                  daily for           



                                                  30 days.           

 

     gabapentin      2022- No        [id number] 300mg      Take 1          

 [facility]



     300 mg      2-17 03-20                          capsule by           ity of



     capsule      00:00: 04:59                          mouth 3           [place]



               00   :00                           (three)           Medical



                                                  times           [facility]



                                                  daily for           



                                                  30 days.           

 

     gabapentin      2022- No        [id number] 300mg      Take 1          

 [facility]



     300 mg      2-17 03-20                          capsule by           ity of



     capsule      00:00: 04:59                          mouth 3           [place]



               00   :00                           (three)           Medical



                                                  times           [facility]



                                                  daily for           



                                                  30 days.           

 

     LOESTRIN  2019- No        [id number] 1{tbl}      Take 1       

    [facility]



     (MICROGESTI      --29                          tablet by           it

y of



     N )      00:00: 00:00                          mouth           [place]



     1 mg-20 mcg      00   :00                           daily.           Medica

l



     (21)/75 mg                                                        [facility]



     (7) tablet                                                        

 

     LOESTRIN  2019- No        [id number] 1{tbl}      Take 1       

    [facility]



     (MICROGESTI      5-29 07-29                          tablet by           it

y of



     N FE )      00:00: 00:00                          mouth           [place]



     1 mg-20 mcg      00   :00                           daily.           Medica

l



     (21)/75 mg                                                        [facility]



     (7) tablet                                                        

 

     LOESTRIN        2019- No        [id number] 1{tbl}      Take 1       

    [facility]



     (MICROGESTI      5-29 07-29                          tablet by           it

y of



     N FE )      00:00: 00:00                          mouth           [place]



     1 mg-20 mcg      00   :00                           daily.           Med[facility]

l



     (21)/75 mg                                                        [facility]



     (7) tablet                                                        

 

     LOESTRIN        2019- No        [id number] 1{tbl}      Take 1       

    [facility]



     (MICROGESTI      5-29 -29                          tablet by           it

y of



     N FE )      00:00: 00:00                          mouth           [place]



     1 mg-20 mcg      00   :00                           daily.           Medica

l



     (21)/75 mg                                                        [facility]



     (7) tablet                                                        

 

     No known                No                                      [facility]



     medications                                                        [facility]

 

     No known                No                                      [facility]



     medications                                                        [facility]

 

     No known                No                                      [facility]



     medications                                                        [facility]

 

     No known                No                                      [facility]



     medications                                                        [facility]







Vital Signs







             Vital Name   Observation Time Observation Value Comments     Source

 

             Body height  2023 15:15:00 165.1 cm                  [facility]

 

             Body weight  2023 15:15:00 92.987 kg                 [facility]

 

             BMI          2023 15:15:00 34.11 kg/m2               [facility]

 

             Systolic blood 2022 18:00:00 107 mm[Hg]                [facility]

[facility]

 

             Diastolic blood 2022 18:00:00 58 mm[Hg]                 [facility]

 

             Heart rate   2022 18:00:00 61 /min                   [facility]

 

             Body temperature 2022 18:00:00 36.44 [facility]                 [facility]

 

             Respiratory rate 2022 18:00:00 20 /min                   [facility]

 

             Oxygen saturation in 2022 18:00:00 100 /min                  

[facility]



             Arterial blood by                                        [place] [facility]

[facility]



             Pulse oximetry                                        [facility]

 

             Body height  2022 03:37:00 165.1 cm                  [facility]

 

             Body weight  2022 01:00:00 86.183 kg                 [facility]

ty [facility]



                                                                 [place] [facility]



                                                                 [facility]

 

             BMI          2022 01:00:00 31.62 kg/m2               Universi

ty of



                                                                 [facility]



                                                                 Branch

 

             Systolic blood 2019 20:12:00 129 mm[Hg]                Univer

sity of



             pressure                                            [facility]



                                                                 Branch

 

             Diastolic blood 2019 20:12:00 74 mm[Hg]                 Unive

rsity of



             pressure                                            [facility]



                                                                 Branch

 

             Heart rate   2019 20:12:00 57 /min                   Universi

ty of



                                                                 [facility]



                                                                 Branch

 

             Body temperature 2019 20:12:00 36.11 Faith                 Univ

ersity of



                                                                 [facility]



                                                                 Branch

 

             Respiratory rate 2019 20:12:00 16 /min                   Univ

ersity of



                                                                 [facility]



                                                                 Branch

 

             Body height  2019 20:12:00 165.1 cm                  Universi

ty of



                                                                 [facility]



                                                                 Branch

 

             Body weight  2019 20:12:00 85.333 kg                 Universi

ty of



                                                                 [facility]



                                                                 Branch

 

             BMI          2019 20:12:00 31.31 kg/m2               Universi

ty of



                                                                 [facility]



                                                                 [facility]anch

 

             Systolic blood 2019 20:37:00 116 mm[Hg]                Univer

sity of



             pressure                                            [facility]



                                                                 Branch

 

             Diastolic blood 2019 20:37:00 80 mm[Hg]                 Unive

rsity of



             pressure                                            [facility]



                                                                 [facility]anch

 

             Body temperature 2019 20:32:00 36.44 Faith                 Univ

ersity of



                                                                 [facility]



                                                                 [facility]anch

 

             Respiratory rate 2019 20:32:00 16 /min                   Univ

ersity of



                                                                 [facility]



                                                                 [facility]anch

 

             Body height  2019 20:32:00 165.1 cm                  Universi

ty of



                                                                 [facility]



                                                                 [facility]anch

 

             Body weight  2019 20:32:00 85.276 kg                 Universi

ty of



                                                                 [facility]



                                                                 Branch

 

             BMI          2019 20:32:00 31.28 kg/m2               Universi

ty of



                                                                 [facility]



                                                                 Branch







Procedures







                Procedure       Date / Time     Performing Clinician Source



                                Performed                       

 

                CONSENT/REFUSAL FOR 2023 15:02:05 Doctor Unassigned, No Un

[facility]



                DIAGNOSIS AND TREATMENT                 Name            Medical 

Branch

 

                AUTHORIZATION FOR 2022 05:01:00 Doctor Unassigned, No [facility]

[facility] [place]



                RELEASE OF PHI                  Name            Medical Branch

 

                EXTERNAL PROVIDER 2022 06:01:00 Doctor Unassigned, No [facility]

[facility] [place]



                RECORDS                         Name            Medical Branch

 

                ABORH CONFIRMATION (LAB 2022 11:40:00 Bhanu Melvin 

[facility]



                ONLY)                                           Medical Branch

 

                HB ABO GROUPING 2022 11:06:00 Bhanu Melvin [facility]

 

                BASIC METABOLIC PANEL 2022 11:05:00 Bhanu Melvin [facility]



                (NA, K, CL, CO2,                                 Medical Branch



                GLUCOSE, BUN,                                   



                CREATININE, CA)                                 

 

                CBC WITH DIFF   2022 11:05:00 Bhanu Melvin [facility]

 

                PROTHROMBIN TIME / INR 2022 11:05:00 [facility]

 

                ACTIVATED PARTIAL 2022 11:05:00 Bhanu Melvin [facility]







Encounters







        Start   End     Encounter Admission Attending Care    Care    Encounter 

Source



        Date/Time Date/Time Type    Type    Clinicians Facility Department ID   

   

 

        2023         Outpatient                 UTH     UTH     [id number]

 [facility]



        09:03:27                                                 [id number] [facility]

 

        2023         Outpatient                 UTH     UTH     [id number]

 [facility]



        09:08:54                                                 [id number] [facility]

 

        2023         Outpatient                 UTH     UTH     [id number]

 [facility]



        08:24:53                                                 [id number] [facility]

 

        2023         Outpatient                 UTH     UTH     [id number]

 [facility]



        13:31:39                                                 [id number] [facility]

 

        2023         Outpatient                 UTH     UTH     [id number]

 [facility]



        10:13:03                                                 [id number] [facility]

 

        [facility]23 [facility]     [id number]

[id number] [facility]



        12:00:00 12:00:00                 ALBERT fish

 

        2023-10-12 2023-10-12 Office          Sandifer, UTP 6414 1.2.840.114 [id number]

[id number] [place]



        12:00:00 12:42:47 Visit           Homer FARIA 350.1.13.58         

[facility]



                                                        9.2.7.2.686         



                                                        [phone]         



                                                        1               

 

        2023 Office          NICK Higgins 6414 1.2.511.525 [id number]

[id number] [place]



        12:00:00 12:11:34 Visit           Albert SANDERS .1.13.58      

   Health



                                                        9.2.7.2.686         



                                                        [phone]         



                                                        1               

 

        2023 Outpatient                 [facility]     [id number]

[id number] [place]



        12:00:00 12:11:34                                                 Health

 

        2023 [facility]     [id number]

[id number] [place]



        11:30:00 11:30:00                 ALBERT fish

 

        2023 Outpatient                 [facility]     [id number]

[id number] [place]



        11:30:00 11:30:00                                                 Health

 

        2023 Office          Sandifer, UTP 6414 1.2.840.114 [id number]

[id number] [place]



        12:00:00 13:01:48 Visit           Homer SANDERS .1.13.58         

Health



                                                        9.2.7.2.686         



                                                        [phone]         



                                                        1               

 

        2023 Office          AbeltrinityNICK 6414 1.2.133.097 [id number]

[id number] [place]



        12:30:00 12:43:04 Visit           Cisco SANDERS .1.13.58         

Health



                                                        9.2.7.2.686         



                                                        [phone]         



                                                        1               

 

        2023 Outpatient                 [facility]     [id number]

[id number] Richard



        08:56:47 08:56:47                                         0823    F



                                                                        Bishnu

 

        2023 Office          Sandifer, UTP 6414 1.2.840.114 [id number]

[id number] [place]



        13:30:00 15:09:20 Visit           Homer SANDERS .1.13.58         

Health



                                                        9.2.7.2.686         



                                                        [phone]         



                                                        1               

 

        2023 Outpatient                 [facility]     [id number]

[id number] [place]



        13:30:00 15:09:20                                                 Health

 

        2023 Outpatient         [facility]    [id number]

[id number] [facility]



        05:40:00 23:59:00                 ALBERTRENA Escalante      

 

        2023 Office          Ronaldo [facility] 6414 1.2.335.811 [id number]

[id number] [place]



        13:15:00 13:58:54 Visit           Albert SANDERS  350.1.13.58      

   Health



                                                        9.2.7.2.686         



                                                        [phone]         



                                                        1               

 

        2023 Outpatient R       PUSHPA [facility]    [id number]

[id number] Univers



        00:00:00 00:00:00                 GONZÁLEZ                          kiko [facility]

 

        2023 Patient         Guy [facility]    1.2.840.114 [id number]

[id number] Univers



        00:00:00 00:00:00 Outreach         Suzan Liazon  350.1.13.10         i

ty of



                                                CLEAR   4.2.7.2.686         [place]

[place]



                                                ALDANA    [phone]         [address]



                                                OFFICE                  



                                                BUILDING                 

 

        2023 Telephone         Randy  [facility]    1.2.441.008 [id number]

[id number] Univers



        00:00:00 00:00:00                 [facility]  350.1.13.10         it

y of



                                                CLEAR   4.2.7.2.686         [place]

[place]



                                                ALDANA    [phone]         [address]



                                                OFFICE                  



                                                BUILDING                 

 

        2023 Office          Randy  [facility]    1.2.840.114 [id number]

[id number] Univers



        09:20:00 09:40:00 Visit           Ken [facility]  350.1.13.10         it

y of



                                                CLEAR   4.2.7.2.686         [place]

[place]



                                                ALDANA    [phone]         [address]



                                                OFFICE                  



                                                BUILDING                 

 

        2023 Outpatient R       KEN PADILLA [facility]    

[id number] Univers



        09:20:00 09:20:00                 KEN PADILLA                        

 scarlet [facility]

 

        2023 Orders          Doctor  KADEN    1.2.840.114 [id number]

119 Univers



        00:00:00 00:00:00 Only            Unassigned, KIT   350.1.13.10       

  ity of



                                        [place] [facility] 4.2.7.2.686         [place]

as



                                                        [phone]         [address]

 

        2023 Nurse           KADEN Draper    1.2.840.114 [id number]

[id number] Univers



        00:00:00 00:00:00 Triage          Shelley PANTOJA   350.1.13.10         it

y of



                                                HOSPITAL 4.2.7.2.686         [place]

as



                                                        [phone]         [address]

 

        2023 Telephone         Randy  [facility]    1.2.045.686 [id number]

[id number] Univers



        00:00:00 00:00:00                 Ken [facility]  350.1.13.10         it

y of



                                                CLEAR   4.2.7.2.686         [place]

[place]



                                                ALDANA    [phone]         [address]



                                                OFFICE                  



                                                BUILDING                 

 

        2022 Outpatient R       KEN PADILLA [facility]    

[id number] Univers



        11:00:00 11:00:00                 KEN PADILLA [facility]

 

        2022 Patient         Doctor  ELIE    1.2.840.114 [id number]

[id number] Univers



        00:00:00 00:00:00 Secure Msg         Unassigned, HEALTH  350.1.13.10    

     ity of



                                        No Name CLEAR   4.2.7.2.686         [place]

[place]



                                                ALDANA    [phone]         [address]



                                                OFFICE                  



                                                BUILDING                 

 

        2022 Outpatient R       KEN PADILLA [facility]    

[id number] Univers



        15:00:00 15:00:00                 KEN PADILLA [facility]

 

        2022 Outpatient R       KEN PADILLA [facility]    

[id number] Univers



        14:30:00 14:30:00                 KEN PADILLA [facility]

 

        2022 Outpatient R       YOANDY  [facility]    [id number]

[id number] Univers



        11:01:18 23:59:00                 CHIANA                         ity o

f



                                                                        [facility]

 

        2022 Hospital         Yoandy  [facility]    1.2.840.114 [id number]

[id number] Univers



        11:01:18 23:59:00 Encounter         Chisherly HEALTH  350.1.13.10        

 ity of



                                                CLEAR   4.2.7.2.686         [place]

[place]



                                                ALDANA    [phone]         [address]



                                                OFFICE                  



                                                BUILDING                 

 

        2022 Orders          Doctor  KADEN    1.2.840.114 [id number]

73 Univers



        00:00:00 00:00:00 Only            Unassigned, KIT   350.1.13.10       

  ity of



                                        [place] [address]2.7.2.686         [place]

as



                                                        [phone]         Medi

allyson



                                                        [address]             [place]

 

        2022 Orders          Doctor  KADEN    1.2.840.114 [id number]

[id number] Univers



        00:00:00 00:00:00 Only            Unassigned, KIT   350.1.13.10       

  ity of



                                        [place] [address]2.7.2.686         [place]

as



                                                        [phone]         Medi

allyson



                                                        009             [place]

 

        2022 Outpatient U       RANDYKEN [facility]     

[id number] Univers



        20:51:00 14:00:00                 KEN PADILLA [facility]

 

        2022 [facility]         HARVEY Padilla  1.2.840.114 [id number]

[id number] Univers



        20:51:00 14:00:00 Encounter         Ken PANTOJA   350.1.13.10         

ity [facility] 4..7.2.686         [place]

as



                                                        [phone]         Medi

allyson



                                                        097             [place]

 

        2021-03-15 2021-03-15 Outpatient R               [facility]    [id number]

116 Univers



        16:00:00 16:00:00                                                 ity of



                                                                        [facility]

 

        2021 Telephone         [facility]    1.2.840.114 [id number]

[id number] Univers



        00:00:00 00:00:00                 Giselle DOAN OB/GYN  350.1.13.10         it

y of



                                                REGIONAL 4.2.7.2.686         [place]

as



                                                MATERNAL [phone]         Med

ical



                                                & CHILD [address] [place] [address]                 

 

        2021 Outpatient R       HOSSEIN [facility]    [id number]

[id number] Univers



        08:45:00 08:45:00                 BRIT hoover o

f



                                                                        [facility]

 

        2020 Telephone         [facility]    1.2.840.114 [id number]

[id number] Univers



        00:00:00 00:00:00                 Giselle DOAN OB/GYN  350.1.13.10         it

y of



                                                REGIONAL 4.2.7.2.686         [place]

as



                                                MATERNAL [phone]         Med

ical



                                                & CHILD [address] [place] [address]                 

 

        2019 Nurse           Visit, [facility]olic Healthdann Nurse [facility]    1.2

.840.114 [id number] 

[facility]



        14:57:22 15:24:24 Visit           Honey Chavira OB/GYN  350.1.13.10

         ity of



                                                REGIONAL 4.2.7.2.686         [place]

as



                                                MATERNAL [phone]         Med

ical



                                                & CHILD [address] [place] [address]                 

 

        2019 Nurse           Visit, Ang-Rmchp Nurse [facility]    1.2

.840.114 [id number] 

[facility]



        16:05:21 16:13:38 Visit           Brit Lange OB/GYN  350.1.13.

10         ity of



                                                REGIONAL 4.2.7.2.686         [place]

as



                                                MATERNAL [phone]         Med

ical



                                                & CHILD [address] [place] [address]                 

 

        2019 Office          ELIE Chavira    1.2.840.114 [id number]

[address] [place] [address]



        14:48:45 16:29:10 Visit           Honey RUGGIERO OB/GYN  350.1.13.10        

 ity of



                                                REGIONAL 4.2.7.2.686         [place]

as



                                                MATERNAL [phone]         Med

ical



                                                & CHILD [address] [place] [address]                 







Results







           Test Description Test Time  Test Comments Results    Result Comments 

Source









                    ABORH Confirmation (Lab Only) 2022 13:30:16 









                      Test Item  Value      Reference Range Interpretation Comme

nts









             ABO & RH (test code = 20) A Positive                             Pe

rformed at [facility] 

[facility]



                                                                 Blood [address] [place] [address]Wpsh

 Free: [phone] No.



                                                                 [id number]



[facility] (NA, K, CL, CO2, 
GLUCOSE, BUN, CREATININE, CA)2022 12:14:34





             Test Item    Value        Reference Range Interpretation Comments

 

             NA (test code = 135 mmol/L   135-145                   



             [id number])                                         

 

             K (test code = 4.3 mmol/L   3.5-5.0                   



             2105011328)                                         

 

             CL (test code = 108 mmol/L                       



             1760243406)                                         

 

             CO2 TOTAL (test code = 22 mmol/L    23-31        L            



             5042080208)                                         

 

             AGAP (test code =              2-16                      



             3611778601)                                         

 

             BUN (test code = 12 mg/dL     7-23                      



             1674193729)                                         

 

             GLUCOSE (test code = 131 mg/dL           H            



             8417333940)                                         

 

             CREATININE (test code = 0.70 mg/dL   0.50-1.04                 



             7403414840)                                         

 

             CALCIUM (test code = 8.3 mg/dL    8.6-10.6     L            



             2552021766)                                         

 

             eGFR (test code =              mL/min/1.73m2              



             1869679257)                                         

 

             KATTY (test code = KATTY) Association of                           



                          Glomerular Filtration                           



                          Rate (GFR) and Staging                           



                          of Kidney Disease*                           



                          +---------------------                           



                          --+-------------------                           



                          --+-------------------                           



                          ------+| GFR                           



                          (mL/min/1.73 m2) ?|                           



                          With Kidney Damage ?|                           



                          ?Without Kidney                           



                          Damage+---------------                           



                          --------+-------------                           



                          --------+-------------                           



                          ------------+| ?>90 ?                           



                          ? ? ? ? ? ? ? ?|                           



                          ?Stage one ? ? ? ? ?|                           



                          ? Normal ? ? ? ? ? ? ?                           



                          ?+--------------------                           



                          ---+------------------                           



                          ---+------------------                           



                          -------+| ?60-89 ? ? ?                           



                          ? ? ? ? ?| ?Stage two                           



                          ? ? ? ? ?| ? Decreased                           



                          GFR ? ? ? ?                            



                          +---------------------                           



                          --+-------------------                           



                          --+-------------------                           



                          ------+| ?30-59 ? ? ?                           



                          ? ? ? ? ?| ?Stage                           



                          three ? ? ? ?| ? Stage                           



                          three ? ? ? ? ?                           



                          +---------------------                           



                          --+-------------------                           



                          --+-------------------                           



                          ------+| ?15-29 ? ? ?                           



                          ? ? ? ? ?| ?Stage four                           



                          ? ? ? ? | ? Stage four                           



                          ? ? ? ? ?                              



                          ?+--------------------                           



                          ---+------------------                           



                          ---+------------------                           



                          -------+| ?<15 (or                           



                          dialysis) ? ?| ?Stage                           



                          five ? ? ? ? | ? Stage                           



                          five ? ? ? ? ?                           



                          ?+--------------------                           



                          ---+------------------                           



                          ---+------------------                           



                          -------+ *Each stage                           



                          assumes the associated                           



                          GFR level has been in                           



                          effect for at least                           



                          three months. ?Stages                           



                          1 to 5, with or                           



                          without kidney                           



                          disease, indicate                           



                          chronic kidney                           



                          disease. Notes:                           



                          Determination of                           



                          stages one and two                           



                          (with eGFR                             



                          >59mL/min/1.73 m2)                           



                          requires estimation of                           



                          kidney damage for at                           



                          least three months as                           



                          defined by structural                           



                          or functional                           



                          abnormalities of the                           



                          kidney, manifested by                           



                          either:Pathological                           



                          abnormalities or                           



                          Markers of kidney                           



                          damage (including                           



                          abnormalities in the                           



                          composition of the                           



                          blood or urine or                           



                          abnormalities in                           



                          imaging tests).                           

 

             Lab Interpretation Abnormal                               



             (test code = 19475-8)                                        



[facility] ONCE [id number] 
11:55:01





             Test Item    Value        Reference Range Interpretation Comments

 

             ABO & RH (test code A POSITIVE                             Performe

d at [facility]



             = 20)                                               Laboratory Serv

ices -



                                                                 [facility]

[address]



                                                                 [facility]

s



                                                                 [id number]:



                                                                 [phone]

A No.



                                                                 [id number]

 

             IAT (test code = Negative                               Performed a

t [facility]



             1185)                                               Laboratory Serv

ices -



                                                                 [facility]

[address]



                                                                 [facility]

s



                                                                 [id number]:



                                                                 [phone]

A No.



                                                                 [id number]



[facility]stPROTHROMBIN TIME / [id number] 11:29:46





             Test Item    Value        Reference Range Interpretation Comments

 

             PROTIME PATIENT (test              See_Comment                [Auto

mated message]



             code = 5964-2)                                        The system [employer]

ich



                                                                 generated this 

result



                                                                 transmitted ref

erence



                                                                 range: 10.1 - 1

2.6



                                                                 Seconds. The re

ference



                                                                 range was not u

sed to



                                                                 interpret this 

result



                                                                 as normal/abnor

mal.

 

             INR (test code = 6301-6)                                        Nor

mal INR <1.1;



                                                                 Warfarin Therap

eutic



                                                                 range 2.0 to 3.

0 or



                                                                 2.5 to 3.5, dep

ending



                                                                 upon the indica

tions.

 

             Lab Interpretation (test Normal                                 



             code = 25554-2)                                        



[facility]stACTIV[facility] PART[facility] THRMPLAS [id number] 
11:29:46





             Test Item    Value        Reference Range Interpretation Comments

 

             APTT Patient (test code              See_Comment  L             [Au

tomated message]



             = 3173-2)                                           The system [employer]

h



                                                                 generated this 

result



                                                                 transmitted ref

erence



                                                                 range: 26 - 36



                                                                 Seconds. The



                                                                 reference range

 was



                                                                 not used to int

erpret



                                                                 this result as



                                                                 normal/abnormal

.

 

             Lab Interpretation (test Abnormal                               



             code = 47097-5)                                        



[facility]st[facility] WITH [id number] 11:22:42





             Test Item    Value        Reference Range Interpretation Comments

 

             WBC (test code =              See_Comment                [Automated



             6690-2)                                             message] The sy

stem



                                                                 which generated



                                                                 this result



                                                                 transmitted



                                                                 reference range

:



                                                                 4.30 - 11.10



                                                                 10*3/?L. The



                                                                 reference range

 was



                                                                 not used to



                                                                 interpret this



                                                                 result as



                                                                 normal/abnormal

.

 

             RBC (test code =              See_Comment  L             [Automated



             279-8)                                              message] The sy

stem



                                                                 which generated



                                                                 this result



                                                                 transmitted



                                                                 reference range

:



                                                                 3.93 - 5.25



                                                                 10*6/?L. The



                                                                 reference range

 was



                                                                 not used to



                                                                 interpret this



                                                                 result as



                                                                 normal/abnormal

.

 

             HGB (test code = 11.6 g/dL    11.6-15.0                 



             718-7)                                              

 

             HCT (test code = 35.2 %       35.7-45.2    L            



             4544-3)                                             

 

             MCV (test code = 92.6 fL      80.6-95.5                 



             787-2)                                              

 

             MCH (test code = 30.5 pg      25.9-32.8                 



             785-6)                                              

 

             MCHC (test code = 33.0 g/dL    31.6-35.1                 



             786-4)                                              

 

             RDW-SD (test code = 47.0 fL      39.0-49.9                 



             44113-8)                                            

 

             RDW-CV (test code = 13.8 %       12.0-15.5                 



             788-0)                                              

 

             PLT (test code =              See_Comment                [Automated



             777-3)                                              message] The sy

stem



                                                                 which generated



                                                                 this result



                                                                 transmitted



                                                                 reference range

:



                                                                 166 - 358 10*3/

?L.



                                                                 The reference r

raine



                                                                 was not used to



                                                                 interpret this



                                                                 result as



                                                                 normal/abnormal

.

 

             MPV (test code = 10.8 fL      9.5-12.9                  



             73791-9)                                            

 

             NRBC/100 WBC (test              See_Comment                [Automat

ed



             code = 7839248840)                                        message] 

The system



                                                                 which generated



                                                                 this result



                                                                 transmitted



                                                                 reference range

:



                                                                 0.0 - 10.0 /100



                                                                 WBCs. The refer

ence



                                                                 range was not u

sed



                                                                 to interpret th

is



                                                                 result as



                                                                 normal/abnormal

.

 

             NRBC x10^3 (test code <0.01        See_Comment                [Auto

mated



             = [id number])                                        message] The s

ystem



                                                                 which generated



                                                                 this result



                                                                 transmitted



                                                                 reference range

:



                                                                 10*3/?L. The



                                                                 reference range

 was



                                                                 not used to



                                                                 interpret this



                                                                 result as



                                                                 normal/abnormal

.

 

             GRAN MAT (NEUT) % 89.4 %                                 



             (test code = 770-8)                                        

 

             IMM GRAN % (test code 0.50 %                                 



             = [id number])                                        

 

             LYMPH % (test code = 7.0 %                                  



             736-9)                                              

 

             MONO % (test code = 3.0 %                                  



             5905-5)                                             

 

             EOS % (test code = 0.0 %                                  



             713-8)                                              

 

             BASO % (test code = 0.1 %                                  



             706-2)                                              

 

             GRAN MAT x10^3(ANC) 9.90 10*3/uL 1.88-7.09    H            



             (test code =                                        



             [id number])                                         

 

             IMM GRAN x10^3 (test 0.05 10*3/uL 0.00-0.06                 



             code = [id number])                                        

 

             LYMPH x10^3 (test code 0.78 10*3/uL 1.32-3.29    L            



             = 731-0)                                            

 

             MONO x10^3 (test code 0.33 10*3/uL 0.33-0.92                 



             = 742-7)                                            

 

             EOS x10^3 (test code = <0.03        0.03-0.39    L            



             711-2)                                              

 

             BASO x10^3 (test code <0.03        0.01-0.07                 



             = 704-7)                                            

 

             Lab Interpretation Abnormal                               



             (test code = 44140-2)                                        



[facility]

## 2023-10-20 NOTE — RAD REPORT
EXAM DESCRIPTION:  CT - Abdomen   Pelvis W Contrast - 10/20/2023 5:15 pm

 

CLINICAL HISTORY:  ABD PAIN

 

COMPARISON:  Abdomen   Pelvis W Contrast dated 5/23/2023; Abdomen   Pelvis W Contrast dated 11/16/201
8; Abdomen   Pelvis W Contrast dated 9/12/2018; CT ABD PELVIS W CONTRAST dated 6/6/2015

 

TECHNIQUE:  Thin cut axial CT imaging of the abdomen and pelvis was performed following intravenous a
dministration of 100 mL Isovue 300. Multiplanar reformats were generated and reviewed.

 

All CT scans are performed using dose optimization technique as appropriate and may include automated
 exposure control or mA/KV adjustment according to patient size.

 

FINDINGS:  No suspicious findings in the lung bases.

 

The liver, spleen, adrenal glands, and pancreas show no suspicious findings. Gallbladder was surgical
ly removed.

 

Symmetric renal function is seen with no hydronephrosis or suspicious renal mass.

 

No dilated bowel loops. Appendix is unremarkable. Long segment wall thickening of decompressed descen
ding and sigmoid colon with some mucosal hyperenhancement. Minimal adjacent fat stranding. No free ai
r, free fluid or fluid collection. No hernia, mass or bulky lymphadenopathy. The urinary bladder is s
uboptimally distended limiting evaluation, without significant finding.

 

No suspicious bony findings.

 

 

IMPRESSION:  Segmental wall thickening of the decompressed descending through sigmoid colon with some
 mucosal hyperenhancement. Findings suggest infectious or inflammatory segmental colitis.

## 2023-10-20 NOTE — EDPHYS
Physician Documentation                                                                           

 [facility]                                                                 

Name: Ashley Ayala                                                                             

Age: 48 yrs                                                                                       

Sex: Female                                                                                       

: 1975                                                                                   

MRN: [id number]                                                                                   

Arrival Date: 10/20/2023                                                                          

Time: 15:30                                                                                       

Account#: [id number]                                                                            

Bed 8                                                                                             

Private MD:                                                                                       

ED Physician Andrew Franz                                                                     

HPI:                                                                                              

10/20                                                                                             

16:11 This 48 yrs old Female presents to ER via Wheelchair with complaints of Bloody Stools,  rt  

      Nausea/Vomiting.                                                                            

16:11 Patient presents to the ED with left lower quadrant pain, reported hematochezia         rt  

      described as bright red blood stools starting yesterday. Patient states that there was      

      not any brown stool, small mount of just bright red blood. Denies any diarrhea,             

      dyschezia. Reports nausea and vomiting. Denies other acute complaints at this time. She     

      has never had similar symptoms previously. Symptoms are moderate severity, no other         

      aggravating alleviating factors..                                                           

                                                                                                  

Historical:                                                                                       

- Allergies:                                                                                      

15:38 Codeine;                                                                                mb9 

15:38 Hydrocodone-Acetaminophen;                                                              mb9 

15:38 PENICILLINS;                                                                            mb9 

- PMHx:                                                                                           

15:38 Ovarian cysts; Fractured right foot (tubal pregnancy);                                  mb9 

- PSHx:                                                                                           

15:38 Cholecystectomy; tubal pregnancy;                                                       mb9 

                                                                                                  

- Immunization history:: Adult Immunizations up to date.                                          

- Social history:: Smoking status: Patient denies any tobacco usage or history of.                

- Family history:: not pertinent.                                                                 

                                                                                                  

                                                                                                  

ROS:                                                                                              

16:11 Constitutional: Negative for fever, chills, and weight loss, Cardiovascular: Negative   rt  

      for chest pain, palpitations, and edema, Respiratory: Negative for shortness of breath,     

      cough, wheezing, and pleuritic chest pain, MS/Extremity: Negative for injury and            

      deformity, Skin: Negative for injury, rash, and discoloration, Neuro: Negative for          

      headache, weakness, numbness, tingling, and seizure, Psych: Negative for depression,        

      anxiety, suicide ideation, homicidal ideation, and hallucinations,                          

16:11 Abdomen/GI: Positive for abdominal pain, nausea and vomiting, rectal bleeding,              

                                                                                                  

Exam:                                                                                             

16:11 Constitutional:  This is a well developed, well nourished patient who is awake, alert,  rt  

      and in no acute distress. Head/Face:  Normocephalic, atraumatic. Chest/axilla:  Normal      

      chest wall appearance and motion.  Nontender with no deformity.  No lesions are             

      appreciated. Cardiovascular:  Regular rate and rhythm with a normal S1 and S2.  No          

      gallops, murmurs, or rubs.  Normal PMI, no JVD.  No pulse deficits. Respiratory:  Lungs     

      have equal breath sounds bilaterally, clear to auscultation and percussion.  No rales,      

      rhonchi or wheezes noted.  No increased work of breathing, no retractions or nasal          

      flaring. Skin:  Warm, dry with normal turgor.  Normal color with no rashes, no lesions,     

      and no evidence of cellulitis. MS/ Extremity:  Pulses equal, no cyanosis.                   

      Neurovascular intact.  Full, normal range of motion. Neuro:  Awake and alert, GCS 15,       

      oriented to person, place, time, and situation.  Cranial nerves II-XII grossly intact.      

      Motor strength 5/5 in all extremities.  Sensory grossly intact.  Cerebellar exam            

      normal.  Normal gait. Psych:  Awake, alert, with orientation to person, place and time.     

       Behavior, mood, and affect are within normal limits.                                       

16:11 Abdomen/GI: Tenderness to the left lower quadrant without rebound, guarding,                

      distention,                                                                                 

                                                                                                  

Vital Signs:                                                                                      

15:36  / 86; Pulse 86; Resp 18; Temp 97.1; Pulse Ox 100% ; Weight 95.25 kg; Height 5    mb9 

      ft. 5 in. ; Pain 4/10;                                                                      

16:23  / 44; Pulse 66; Resp 18 S; Pulse Ox 100% on R/A;                                 aa5 

18:00  / 50; Pulse 63; Resp 18 S; Pulse Ox 100% on R/A;                                 aa5 

18:48  / 55; Pulse 60; Resp 17; Pulse Ox 99% on R/A;                                    rs5 

15:36 Body Mass Index 34.95 (95.25 kg, 165.1 cm)                                              mb9 

15:36 Pain Scale: Adult                                                                       mb9 

                                                                                                  

MDM:                                                                                              

15:42 Patient medically screened.                                                             rt  

20:12 Differential diagnosis: Colitis, diverticulitis, hemorrhoid. Data reviewed: vital       rt  

      signs, nurses notes. Consideration of Admission/Observation Escalation of care              

      including admission/observation considered. Patient has no septic indicators, stable        

      labs, pain is well controlled, mild colitis seen on CT scan, do not suspect upper GI        

      bleed, stable for outpatient care, return precautions discussed.. I considered the          

      following discharge prescriptions or medication management in the emergency department      

      Medications were administered in the Emergency Department. See MAR. Independent             

      interpretation of the following test(s) in the Emergency Department CT Scan: My             

      interpretation is No bowel obstruction symmetric rotation of CT scan images.                

      Counseling: I had a detailed discussion with the patient and/or guardian regarding the      

      historical points, exam findings, and any diagnostic results supporting the                 

      discharge/admit diagnosis, lab results, radiology results, the need for outpatient          

      follow up, to return to the emergency department if symptoms worsen or persist or if        

      there are any questions or concerns that arise at home. Response to treatment: the          

      patient's symptoms have markedly improved after treatment.                                  

                                                                                                  

10/20                                                                                             

16:03 Order name: CBC with Diff; Complete Time: 17:15                                         rt  

10/20                                                                                             

16:03 Order name: CMP; Complete Time: 17:15                                                   rt  

10/20                                                                                             

16:03 Order name: Lipase; Complete Time: 17:15                                                rt  

10/20                                                                                             

16:03 Order name: Pregnancy Test, Serum; Complete Time: 17:15                                 rt  

10/20                                                                                             

16:03 Order name: CT Abd/Pelvis - IV Contrast Only; Complete Time: 17:50                      rt  

10/20                                                                                             

16:03 Order name: IV Saline Lock; Complete Time: 16:23                                        rt  

10/20                                                                                             

16:03 Order name: Labs collected and sent; Complete Time: 16:23                               rt  

                                                                                                  

Administered Medications:                                                                         

16:20 Drug: Ondansetron IVP 4 mg IVP once; over 2 minutes Route: IVP; Site: right antecubital;aa5 

16:23 Follow up: Response: No adverse reaction                                                aa5 

16:23 Drug: NS 0.9% IV 1000 ml IV at 1 bolus Per protocol; 1000 mL bolus Route: IV; Rate: 1   aa5 

      bolus; Site: right antecubital;                                                             

16:40 Follow up: Response: No adverse reaction                                                rs5 

16:23 Drug: fentaNYL (PF)  mcg IVP once Route: IVP; Site: right antecubital;           aa5 

16:30 Follow up: Response: No adverse reaction                                                aa5 

                                                                                                  

                                                                                                  

Disposition Summary:                                                                              

10/20/23 18:31                                                                                    

Discharge Ordered                                                                                 

 Notes:       Location: Home                                                                        
  rt

      Problem: new                                                                            rt  

      Symptoms: have improved                                                                 rt  

      Condition: Stable                                                                       rt  

      Diagnosis                                                                                   

        - Left sided colitis                                                                  rt  

      Followup:                                                                               rt  

        - With: Neptali Castaneda MD                                                                

        - When: 7 - 10 days                                                                        

        - Reason:                                                                                  

      Discharge Instructions:                                                                     

        - Discharge Summary Sheet                                                             rt  

        - Colitis                                                                             rt  

      Forms:                                                                                      

        - Medication Reconciliation Form                                                      rt  

        - Thank You Letter                                                                    rt  

        - Antibiotic Education                                                                rt  

        - Prescription Opioid Use                                                             rt  

        - Patient Portal Instructions                                                         rt  

        - Leadership Thank You Letter                                                         rt  

      Prescriptions:                                                                              

        - Flagyl 500 mg Oral tablet                                                                

            - take 1 tablet ORAL route every 8 hours for 7 days; 21 tablet; Refills: 0,       rt  

      Product Selection Permitted                                                                 

        - Cipro 500 mg Oral Tablet                                                                 

            - take 1 tablet ORAL route every 12 hours for 7 days; 14 tablet; Refills: 0,      rt  

      Product Selection Permitted                                                                 

Signatures:                                                                                       

Dispatcher [employer]                           Eliza Mayo RN                     RN   aa5                                                  

Ana Laura Jarquin RN                 RN   mb9                                                  

Andrew Franz MD MD   rt                                                   

Gal Sánchez RN                       RN   rs5                                                  

                                                                                                  

**************************************************************************************************

## 2023-10-20 NOTE — ER
Nurse's Notes                                                                                     

 [facility]                                                                 

Name: Ashley Ayala                                                                             

Age: 48 yrs                                                                                       

Sex: Female                                                                                       

: 1975                                                                                   

MRN: [id number]                                                                                   

Arrival Date: 10/20/2023                                                                          

Time: 15:30                                                                                       

Account#: [id number]                                                                            

Bed 8                                                                                             

Private MD:                                                                                       

Diagnosis: Left sided colitis                                                                     

                                                                                                  

Presentation:                                                                                     

10/20                                                                                             

15:36 Chief complaint: Patient states: "I've been having stomach cramps, hot flashes, and     mb9 

      chills since last night. Last night, I started having blood stools that are bright red.     

      Now I'm vomiting yellow bile and having lower abdominal pain". Coronavirus screen:          

      Vaccine status: Patient reports receiving the 2nd dose of the covid vaccine. Ebola          

      Screen: No symptoms or risks identified at this time. Initial Sepsis Screen: Does the       

      patient meet any 2 criteria? No. Patient's initial sepsis screen is negative. Does the      

      patient have a suspected source of infection? No. Patient's initial sepsis screen is        

      negative. Risk Assessment: Do you want to hurt yourself or someone else? Patient            

      reports no desire to harm self or others. Onset of symptoms was 2023.           

15:36 Method Of Arrival: Wheelchair                                                           mb9 

15:36 Acuity: GURVINDER 3                                                                           mb9 

                                                                                                  

Triage Assessment:                                                                                

15:50 General: Appears in no apparent distress. comfortable, Behavior is calm, cooperative.   rs5 

15:50 Pain: Complains of pain in left lower quadrant. GI: Abdomen is round non-distended,     rs5 

      Bowel sounds present X 4 quads. Abd is soft and non tender X 4 quads.                       

                                                                                                  

Historical:                                                                                       

- Allergies:                                                                                      

15:38 Codeine;                                                                                mb9 

15:38 Hydrocodone-Acetaminophen;                                                              mb9 

15:38 PENICILLINS;                                                                            mb9 

- PMHx:                                                                                           

15:38 Ovarian cysts; Fractured right foot (tubal pregnancy);                                  mb9 

- PSHx:                                                                                           

15:38 Cholecystectomy; tubal pregnancy;                                                       mb9 

                                                                                                  

- Immunization history:: Adult Immunizations up to date.                                          

- Social history:: Smoking status: Patient denies any tobacco usage or history of.                

- Family history:: not pertinent.                                                                 

                                                                                                  

                                                                                                  

Screening:                                                                                        

15:36 [facility] ED Fall Risk Assessment (Adult) History of falling in the last 3 months,       rs5 

      including since admission No falls in past 3 months (0 pts) Confusion or Disorientation     

      No (0 pts) Intoxicated or Sedated No (0 pts) Impaired Gait No (0 pts) Mobility Assist       

      Device Used No (0 pt) Altered Elimination No (0 pt) Score/Fall Risk Level 0 - 2 = Low       

      Risk Oriented to surroundings, Maintained a safe environment.                               

15:36 Abuse screen: Denies threats or abuse. Nutritional screening: No deficits noted.        rs5 

      Tuberculosis screening: No symptoms or risk factors identified.                             

                                                                                                  

Assessment:                                                                                       

15:40 General: Appears in no apparent distress. uncomfortable, Behavior is calm, cooperative. rs5 

15:40 Pain: Complains of pain in head and left lower quadrant Pain does not radiate. Pain     rs5 

      currently is 9 out of 10 on a pain scale. Quality of pain is described as aching, Pain      

      began 1 day ago. Is continuous. Neuro: Level of Consciousness is awake, alert, obeys        

      commands, Oriented to person, place, time, situation. Cardiovascular: Heart tones S1 S2     

      present Rhythm is regular. Respiratory: Airway is patent Respiratory effort is even,        

      unlabored, Respiratory pattern is regular, symmetrical, Breath sounds are clear             

      bilaterally. GI: Abdomen is round non-distended, Last BM was 2023. Bowel        

      sounds present X 4 quads. Abd is soft and non tender X 4 quads. Reports upper abdominal     

      pain, bloody stool. : No signs and/or symptoms were reported regarding the                

      genitourinary system. EENT: No signs and/or symptoms were reported regarding the EENT       

      system. EENT: Denies blurred vision. Derm: Derm: Skin is intact, Skin is pink, warm \T\     

      dry. Musculoskeletal: Range of motion: intact in all extremities.                           

16:23 Reassessment: Patient is alert, oriented x 3, equal unlabored respirations, skin        aa5 

      warm/dry/pink.                                                                              

16:30 Pain: Complains of pain in head and left lower quadrant Pain does not radiate. Pain     rs5 

      currently is 1 out of 10 on a pain scale. Quality of pain is described as aching, Pain      

      began 1 day ago. Is continuous.                                                             

17:40 Reassessment: No changes from previously documented assessment. Patient and/or family   rs5 

      updated on plan of care and expected duration. Pain level reassessed. Patient is alert,     

      oriented x 3, equal unlabored respirations, skin warm/dry/pink.                             

18:30 Reassessment: No changes from previously documented assessment. Patient and/or family   rs5 

      updated on plan of care and expected duration. Pain level reassessed.                       

                                                                                                  

Vital Signs:                                                                                      

15:36  / 86; Pulse 86; Resp 18; Temp 97.1; Pulse Ox 100% ; Weight 95.25 kg; Height 5    mb9 

      ft. 5 in. ; Pain 4/10;                                                                      

16:23  / 44; Pulse 66; Resp 18 S; Pulse Ox 100% on R/A;                                 aa5 

18:00  / 50; Pulse 63; Resp 18 S; Pulse Ox 100% on R/A;                                 aa5 

18:48  / 55; Pulse 60; Resp 17; Pulse Ox 99% on R/A;                                    rs5 

15:36 Body Mass Index 34.95 (95.25 kg, 165.1 cm)                                              mb9 

15:36 Pain Scale: Adult                                                                       mb9 

                                                                                                  

ED Course:                                                                                        

15:33 Patient arrived in ED.                                                                  ts1 

15:35 Andrew Frnaz MD is Attending Physician.                                            rt  

15:36 Patient has correct armband on for positive identification. Call light in reach. Side   rs5 

      rails up X2.                                                                                

15:38 Triage completed.                                                                       mb9 

15:39 Arm band placed on.                                                                     mb9 

15:40 Sakshi Vargas, RN is Primary Nurse.                                                    ph  

16:18 Inserted saline lock: 22 gauge in right antecubital area, using aseptic technique. IV   aa5 

      inserted by Chris Sánchez RN.                                                             

17:16 CT Abd/Pelvis - IV Contrast Only In Process Unspecified.                                EDMS

18:30 No provider procedures requiring assistance completed.                                  rs5 

18:30 IV discontinued, intact, bleeding controlled, No redness/swelling at site. Pressure     rs5 

      dressing applied.                                                                           

18:31 Neptali Castaneda MD is Referral Physician.                                              rt  

                                                                                                  

Administered Medications:                                                                         

16:20 Drug: Ondansetron IVP 4 mg IVP once; over 2 minutes Route: IVP; Site: right antecubital;aa5 

16:23 Follow up: Response: No adverse reaction                                                aa5 

16:23 Drug: NS 0.9% IV 1000 ml IV at 1 bolus Per protocol; 1000 mL bolus Route: IV; Rate: 1   aa5 

      bolus; Site: right antecubital;                                                             

16:40 Follow up: Response: No adverse reaction                                                rs5 

16:23 Drug: fentaNYL (PF)  mcg IVP once Route: IVP; Site: right antecubital;           aa5 

16:30 Follow up: Response: No adverse reaction                                                aa5 

                                                                                                  

                                                                                                  

Medication:                                                                                       

18:30 VIS not applicable for this client.                                                     rs5 

                                                                                                  

Outcome:                                                                                          

18:30 Discharged to home ambulatory,                                                          rs5 

18:30 Condition: stable                                                                           

18:30 Discharge instructions given to patient, Instructed on discharge instructions, follow       

      up and referral plans. medication usage, Demonstrated understanding of instructions,        

      follow-up care, medications, Prescriptions given X 2,                                       

18:31 Discharge ordered by MD.                                                                rt  

18:42 Patient left the ED.                                                                    rs5 

                                                                                                  

Signatures:                                                                                       

Dispatcher [employer]                           EDMS                                                 

Eliza Juarez RN                     RN   aa5                                                  

Sakshi Vargas RN                      RN                                                      

Ana Laura Jarquin RN                 RN   mb9                                                  

Andrew Franz MD MD   rt                                                   

Gal Sánchez RN                       RN   rs5                                                  

Sarah Ruano PAS                     PAS  ts1                                                  

                                                                                                  

Corrections: (The following items were deleted from the chart)                                    

18:11 17:23  / 44; Pulse 66bpm; Resp 18bpm; Spontaneous; Pulse Ox 100% RA; aa5          aa5 

19:15 19:13 General: Appears rs5                                                              rs5 

                                                                                                  

**************************************************************************************************